# Patient Record
Sex: MALE | Race: WHITE | NOT HISPANIC OR LATINO | Employment: OTHER | URBAN - METROPOLITAN AREA
[De-identification: names, ages, dates, MRNs, and addresses within clinical notes are randomized per-mention and may not be internally consistent; named-entity substitution may affect disease eponyms.]

---

## 2023-04-07 DIAGNOSIS — K21.00 GASTROESOPHAGEAL REFLUX DISEASE WITH ESOPHAGITIS WITHOUT HEMORRHAGE: Primary | ICD-10-CM

## 2023-04-07 RX ORDER — OMEPRAZOLE 40 MG/1
1 CAPSULE, DELAYED RELEASE ORAL DAILY
COMMUNITY
Start: 2013-05-02 | End: 2023-04-07 | Stop reason: SDUPTHER

## 2023-04-07 RX ORDER — OMEPRAZOLE 40 MG/1
40 CAPSULE, DELAYED RELEASE ORAL DAILY
Qty: 90 CAPSULE | Refills: 0 | Status: SHIPPED | OUTPATIENT
Start: 2023-04-07 | End: 2023-05-08 | Stop reason: SDUPTHER

## 2023-04-11 LAB
NATRIURETIC PEPTIDE B (PG/ML) IN SER/PLAS: 22 PG/ML (ref 0–99)
TROPONIN I, HIGH SENSITIVITY: 3 NG/L (ref 0–53)

## 2023-05-01 ENCOUNTER — HOSPITAL ENCOUNTER (OUTPATIENT)
Dept: DATA CONVERSION | Facility: HOSPITAL | Age: 73
End: 2023-05-01
Attending: INTERNAL MEDICINE | Admitting: INTERNAL MEDICINE
Payer: MEDICARE

## 2023-05-01 DIAGNOSIS — Z76.82 AWAITING ORGAN TRANSPLANT STATUS: ICD-10-CM

## 2023-05-01 DIAGNOSIS — C83.18 MANTLE CELL LYMPHOMA, LYMPH NODES OF MULTIPLE SITES (MULTI): ICD-10-CM

## 2023-05-08 DIAGNOSIS — K21.00 GASTROESOPHAGEAL REFLUX DISEASE WITH ESOPHAGITIS WITHOUT HEMORRHAGE: ICD-10-CM

## 2023-05-08 DIAGNOSIS — E78.00 PURE HYPERCHOLESTEROLEMIA: Primary | ICD-10-CM

## 2023-05-08 RX ORDER — OMEPRAZOLE 40 MG/1
40 CAPSULE, DELAYED RELEASE ORAL DAILY
Qty: 90 CAPSULE | Refills: 1 | Status: SHIPPED | OUTPATIENT
Start: 2023-05-08 | End: 2023-09-25 | Stop reason: SDUPTHER

## 2023-05-08 RX ORDER — ATORVASTATIN CALCIUM 40 MG/1
TABLET, FILM COATED ORAL
Qty: 45 TABLET | Refills: 1 | Status: SHIPPED | OUTPATIENT
Start: 2023-05-08 | End: 2024-01-22 | Stop reason: SDUPTHER

## 2023-05-11 ENCOUNTER — HOSPITAL ENCOUNTER (OUTPATIENT)
Dept: DATA CONVERSION | Facility: HOSPITAL | Age: 73
End: 2023-05-11
Attending: INTERNAL MEDICINE
Payer: MEDICARE

## 2023-05-11 DIAGNOSIS — E78.5 HYPERLIPIDEMIA, UNSPECIFIED: ICD-10-CM

## 2023-05-11 DIAGNOSIS — Z85.46 PERSONAL HISTORY OF MALIGNANT NEOPLASM OF PROSTATE: ICD-10-CM

## 2023-05-11 DIAGNOSIS — R42 DIZZINESS AND GIDDINESS: ICD-10-CM

## 2023-05-11 DIAGNOSIS — K21.9 GASTRO-ESOPHAGEAL REFLUX DISEASE WITHOUT ESOPHAGITIS: ICD-10-CM

## 2023-05-11 DIAGNOSIS — Z87.891 PERSONAL HISTORY OF NICOTINE DEPENDENCE: ICD-10-CM

## 2023-05-11 DIAGNOSIS — C83.18 MANTLE CELL LYMPHOMA, LYMPH NODES OF MULTIPLE SITES (MULTI): ICD-10-CM

## 2023-09-07 VITALS — WEIGHT: 195.33 LBS | BODY MASS INDEX: 27.96 KG/M2 | HEIGHT: 70 IN

## 2023-09-19 PROBLEM — S05.02XA LEFT CORNEAL ABRASION: Status: ACTIVE | Noted: 2023-09-19

## 2023-09-19 PROBLEM — S05.8X9A: Status: ACTIVE | Noted: 2023-09-19

## 2023-09-19 PROBLEM — G43.109 MIGRAINE WITH VISUAL AURA: Status: ACTIVE | Noted: 2023-09-19

## 2023-09-19 PROBLEM — S05.32XA: Status: ACTIVE | Noted: 2023-09-19

## 2023-09-19 PROBLEM — H81.8X2 UNILATERAL VESTIBULAR WEAKNESS, LEFT: Status: ACTIVE | Noted: 2023-09-19

## 2023-09-19 PROBLEM — J02.9 ACUTE PHARYNGITIS: Status: ACTIVE | Noted: 2023-09-19

## 2023-09-19 PROBLEM — H26.9 CATARACT OF RIGHT EYE: Status: ACTIVE | Noted: 2023-09-19

## 2023-09-19 PROBLEM — E86.0 DEHYDRATION: Status: ACTIVE | Noted: 2023-09-19

## 2023-09-19 PROBLEM — U07.1 DISEASE DUE TO SEVERE ACUTE RESPIRATORY SYNDROME CORONAVIRUS 2 (SARS-COV-2): Status: ACTIVE | Noted: 2023-09-19

## 2023-09-19 PROBLEM — E78.00 HYPERCHOLESTEROLEMIA: Status: ACTIVE | Noted: 2023-09-19

## 2023-09-19 PROBLEM — R60.9 EDEMA: Status: ACTIVE | Noted: 2023-09-19

## 2023-09-19 PROBLEM — E56.9 VITAMIN DEFICIENCY: Status: ACTIVE | Noted: 2023-09-19

## 2023-09-19 PROBLEM — E55.9 VITAMIN D DEFICIENCY: Status: ACTIVE | Noted: 2023-09-19

## 2023-09-19 PROBLEM — R05.3 CHRONIC COUGH: Status: ACTIVE | Noted: 2023-09-19

## 2023-09-19 PROBLEM — R59.0 MEDIASTINAL ADENOPATHY: Status: ACTIVE | Noted: 2023-09-19

## 2023-09-19 PROBLEM — M19.90 OSTEOARTHRITIS: Status: ACTIVE | Noted: 2023-09-19

## 2023-09-19 PROBLEM — H33.22 LEFT RETINAL DETACHMENT: Status: ACTIVE | Noted: 2023-09-19

## 2023-09-19 PROBLEM — H25.11 CATARACT, NUCLEAR SCLEROTIC, RIGHT EYE: Status: ACTIVE | Noted: 2023-09-19

## 2023-09-19 PROBLEM — Z86.69 H/O RETINAL DETACHMENT: Status: ACTIVE | Noted: 2023-09-19

## 2023-09-19 PROBLEM — M25.50 ARTHRALGIA: Status: ACTIVE | Noted: 2023-09-19

## 2023-09-19 PROBLEM — G92.02: Status: ACTIVE | Noted: 2023-09-19

## 2023-09-19 PROBLEM — G89.3 NEOPLASM RELATED PAIN: Status: ACTIVE | Noted: 2023-09-19

## 2023-09-19 PROBLEM — H90.72 MIXED CONDUCTIVE AND SENSORINEURAL HEARING LOSS OF LEFT EAR WITH UNRESTRICTED HEARING OF RIGHT EAR: Status: ACTIVE | Noted: 2023-09-19

## 2023-09-19 PROBLEM — D84.9 IMMUNOCOMPROMISED STATE (MULTI): Status: ACTIVE | Noted: 2023-09-19

## 2023-09-19 PROBLEM — H50.112 SENSORY DEPRIVATION EXOTROPIA OF LEFT EYE: Status: ACTIVE | Noted: 2023-09-19

## 2023-09-19 PROBLEM — R42 DIZZINESS: Status: ACTIVE | Noted: 2023-09-19

## 2023-09-19 PROBLEM — H54.7 SENSORY DEPRIVATION EXOTROPIA OF LEFT EYE: Status: ACTIVE | Noted: 2023-09-19

## 2023-09-19 PROBLEM — H35.351 CYSTOID MACULAR EDEMA OF RIGHT EYE: Status: ACTIVE | Noted: 2023-09-19

## 2023-09-19 PROBLEM — D61.810 PANCYTOPENIA DUE TO CHEMOTHERAPY (CMS-HCC): Status: ACTIVE | Noted: 2023-09-19

## 2023-09-19 PROBLEM — H40.001 GLAUCOMA SUSPECT, RIGHT EYE: Status: ACTIVE | Noted: 2023-09-19

## 2023-09-19 PROBLEM — J01.90 ACUTE SINUSITIS: Status: ACTIVE | Noted: 2023-09-19

## 2023-09-19 PROBLEM — Z85.72 PERSONAL HISTORY OF LYMPHOMA: Status: ACTIVE | Noted: 2023-09-19

## 2023-09-19 PROBLEM — I26.99 BILATERAL PULMONARY EMBOLISM (MULTI): Status: ACTIVE | Noted: 2023-09-19

## 2023-09-19 PROBLEM — R07.9 CHEST PAIN: Status: ACTIVE | Noted: 2023-09-19

## 2023-09-19 PROBLEM — R06.09 DYSPNEA ON EXERTION: Status: ACTIVE | Noted: 2023-09-19

## 2023-09-19 PROBLEM — C83.10 MANTLE CELL LYMPHOMA (MULTI): Status: ACTIVE | Noted: 2023-09-19

## 2023-09-19 PROBLEM — R42 SUBJECTIVE VERTIGO: Status: ACTIVE | Noted: 2023-09-19

## 2023-09-19 PROBLEM — K21.9 ESOPHAGEAL REFLUX: Status: ACTIVE | Noted: 2023-09-19

## 2023-09-19 PROBLEM — M06.9 RHEUMATOID ARTHRITIS (MULTI): Status: ACTIVE | Noted: 2023-09-19

## 2023-09-19 PROBLEM — H61.23 BILATERAL IMPACTED CERUMEN: Status: ACTIVE | Noted: 2023-09-19

## 2023-09-19 PROBLEM — N20.0 KIDNEY STONE ON RIGHT SIDE: Status: ACTIVE | Noted: 2023-09-19

## 2023-09-19 PROBLEM — C34.90 LUNG CANCER (MULTI): Status: ACTIVE | Noted: 2023-09-19

## 2023-09-19 PROBLEM — D69.6 THROMBOCYTOPENIA (CMS-HCC): Status: ACTIVE | Noted: 2023-09-19

## 2023-09-19 PROBLEM — H81.319 VERTIGO, AURAL: Status: ACTIVE | Noted: 2023-09-19

## 2023-09-19 PROBLEM — H40.059 OCULAR HYPERTENSION: Status: ACTIVE | Noted: 2023-09-19

## 2023-09-19 PROBLEM — H35.371 EPIRETINAL MEMBRANE (ERM) OF RIGHT EYE: Status: ACTIVE | Noted: 2023-09-19

## 2023-09-19 PROBLEM — H52.7 REFRACTIVE ERROR: Status: ACTIVE | Noted: 2023-09-19

## 2023-09-19 PROBLEM — K22.89 ESOPHAGEAL MASS: Status: ACTIVE | Noted: 2023-09-19

## 2023-09-19 PROBLEM — R53.83 FATIGUE: Status: ACTIVE | Noted: 2023-09-19

## 2023-09-19 PROBLEM — H53.8 BLURRED VISION, BILATERAL: Status: ACTIVE | Noted: 2023-09-19

## 2023-09-19 PROBLEM — H40.32X0: Status: ACTIVE | Noted: 2023-09-19

## 2023-09-19 RX ORDER — CETIRIZINE HYDROCHLORIDE 10 MG/1
10 TABLET ORAL NIGHTLY
COMMUNITY
Start: 2022-10-05 | End: 2023-10-24 | Stop reason: WASHOUT

## 2023-09-19 RX ORDER — MULTIVITAMIN
1 TABLET ORAL DAILY
COMMUNITY

## 2023-09-19 RX ORDER — URSODIOL 300 MG/1
CAPSULE ORAL
COMMUNITY
Start: 2023-05-29 | End: 2023-10-24 | Stop reason: WASHOUT

## 2023-09-19 RX ORDER — ACYCLOVIR 400 MG/1
1 TABLET ORAL EVERY 12 HOURS
COMMUNITY
Start: 2023-05-29 | End: 2023-10-24 | Stop reason: SDUPTHER

## 2023-09-19 RX ORDER — BENZONATATE 200 MG/1
1 CAPSULE ORAL
COMMUNITY
Start: 2022-10-12 | End: 2023-10-24 | Stop reason: WASHOUT

## 2023-09-19 RX ORDER — TRAMADOL HYDROCHLORIDE 50 MG/1
1 TABLET ORAL 2 TIMES DAILY PRN
COMMUNITY
Start: 2022-12-07 | End: 2023-10-24 | Stop reason: WASHOUT

## 2023-09-19 RX ORDER — SULFAMETHOXAZOLE AND TRIMETHOPRIM 800; 160 MG/1; MG/1
TABLET ORAL
COMMUNITY
Start: 2023-05-30 | End: 2023-09-26

## 2023-09-19 RX ORDER — MUPIROCIN 20 MG/G
OINTMENT TOPICAL
COMMUNITY
Start: 2023-02-15 | End: 2023-10-24 | Stop reason: WASHOUT

## 2023-09-19 RX ORDER — PANTOPRAZOLE SODIUM 40 MG/1
1 TABLET, DELAYED RELEASE ORAL DAILY
COMMUNITY
Start: 2023-06-12 | End: 2023-10-24 | Stop reason: WASHOUT

## 2023-09-19 RX ORDER — FLUCONAZOLE 200 MG/1
2 TABLET ORAL DAILY
COMMUNITY
Start: 2023-05-30 | End: 2023-10-24 | Stop reason: WASHOUT

## 2023-09-19 RX ORDER — ASPIRIN 81 MG/1
1 TABLET ORAL DAILY
COMMUNITY
End: 2023-10-24 | Stop reason: WASHOUT

## 2023-09-19 RX ORDER — COVID-19 MOLECULAR TEST ASSAY
KIT MISCELLANEOUS
COMMUNITY
Start: 2023-03-10 | End: 2023-09-25

## 2023-09-19 RX ORDER — GUAIFENESIN 100 MG/5ML
20 SOLUTION ORAL EVERY 4 HOURS PRN
COMMUNITY
Start: 2023-05-29 | End: 2023-10-24 | Stop reason: WASHOUT

## 2023-09-19 RX ORDER — ALBUTEROL SULFATE 90 UG/1
AEROSOL, METERED RESPIRATORY (INHALATION)
COMMUNITY
Start: 2022-07-27 | End: 2024-01-22 | Stop reason: ALTCHOICE

## 2023-09-25 ENCOUNTER — OFFICE VISIT (OUTPATIENT)
Dept: PRIMARY CARE | Facility: CLINIC | Age: 73
End: 2023-09-25
Payer: MEDICARE

## 2023-09-25 VITALS
HEIGHT: 70 IN | BODY MASS INDEX: 28.49 KG/M2 | SYSTOLIC BLOOD PRESSURE: 102 MMHG | WEIGHT: 199 LBS | DIASTOLIC BLOOD PRESSURE: 60 MMHG

## 2023-09-25 DIAGNOSIS — K21.00 GASTROESOPHAGEAL REFLUX DISEASE WITH ESOPHAGITIS WITHOUT HEMORRHAGE: ICD-10-CM

## 2023-09-25 DIAGNOSIS — I10 HYPERTENSION, UNSPECIFIED TYPE: ICD-10-CM

## 2023-09-25 DIAGNOSIS — Z79.2 LONG-TERM CURRENT USE OF ANTIBIOTICS FOR PREVENTION OF RECURRENT INFECTION: ICD-10-CM

## 2023-09-25 DIAGNOSIS — N40.0 BENIGN PROSTATIC HYPERPLASIA, UNSPECIFIED WHETHER LOWER URINARY TRACT SYMPTOMS PRESENT: ICD-10-CM

## 2023-09-25 DIAGNOSIS — C83.10 MANTLE CELL LYMPHOMA, UNSPECIFIED BODY REGION (MULTI): Primary | ICD-10-CM

## 2023-09-25 DIAGNOSIS — E78.00 HYPERCHOLESTEROLEMIA: ICD-10-CM

## 2023-09-25 PROCEDURE — 1126F AMNT PAIN NOTED NONE PRSNT: CPT | Performed by: INTERNAL MEDICINE

## 2023-09-25 PROCEDURE — 99213 OFFICE O/P EST LOW 20 MIN: CPT | Performed by: INTERNAL MEDICINE

## 2023-09-25 PROCEDURE — 1159F MED LIST DOCD IN RCRD: CPT | Performed by: INTERNAL MEDICINE

## 2023-09-25 PROCEDURE — 3074F SYST BP LT 130 MM HG: CPT | Performed by: INTERNAL MEDICINE

## 2023-09-25 PROCEDURE — 3078F DIAST BP <80 MM HG: CPT | Performed by: INTERNAL MEDICINE

## 2023-09-25 RX ORDER — OMEPRAZOLE 40 MG/1
40 CAPSULE, DELAYED RELEASE ORAL DAILY
Qty: 90 CAPSULE | Refills: 1 | Status: SHIPPED | OUTPATIENT
Start: 2023-09-25 | End: 2023-10-12 | Stop reason: SDUPTHER

## 2023-09-25 ASSESSMENT — ENCOUNTER SYMPTOMS
OCCASIONAL FEELINGS OF UNSTEADINESS: 0
LOSS OF SENSATION IN FEET: 0
DEPRESSION: 0

## 2023-09-25 NOTE — PROGRESS NOTES
"Subjective   Patient ID: Rip Avalos is a 72 y.o. male who presents for Follow-up (Multiple medical conditions).    1. This gentleman today came here for follow-up on various conditions.  He just lost his grandson.  He is stressed out because of that.  2. He himself got recurrence of Mantle cell lymphoma, for which he is under treatment under [facility].  Prostate, he is going to Dr. Campos at [facility].  Otherwise, appetite and weight are okay.  Sleep okay.    I have personally reviewed the patient's Past Medical History, Medications, Allergies, Social History, and Family History in the EMR.    Review of Systems   All other systems reviewed and are negative.  The patient never had a stroke.  No heart attack.  No diabetes.    Objective   /60   Ht 1.778 m (5' 10\")   Wt 90.3 kg (199 lb)   BMI 28.55 kg/m²     Physical Exam  Vitals reviewed.   Cardiovascular:      Heart sounds: Normal heart sounds, S1 normal and S2 normal. No murmur heard.     No friction rub.   Pulmonary:      Effort: Pulmonary effort is normal.      Breath sounds: Normal breath sounds and air entry.   Abdominal:      Palpations: There is no hepatomegaly, splenomegaly or mass.   Musculoskeletal:      Right lower leg: No edema.      Left lower leg: No edema.   Lymphadenopathy:      Lower Body: No right inguinal adenopathy. No left inguinal adenopathy.   Neurological:      Cranial Nerves: Cranial nerves 2-12 are intact.      Sensory: No sensory deficit.      Motor: Motor function is intact.      Deep Tendon Reflexes: Reflexes are normal and symmetric.     LAB WORK: Laboratory testing discussed.    Assessment/Plan   Problem List Items Addressed This Visit             ICD-10-CM       Cardiac and Vasculature    Hypercholesterolemia - Primary E78.00    Relevant Orders    CBC and Auto Differential    Comprehensive Metabolic Panel    Urinalysis with Reflex Microscopic    Thyroid Stimulating Hormone    Lipid Panel       " Gastrointestinal and Abdominal    Esophageal reflux K21.9    Relevant Medications    omeprazole (PriLOSEC) 40 mg DR capsule       Hematology and Neoplasia    Mantle cell lymphoma (CMS/HCC) C83.10       Symptoms and Signs    Fatigue R53.83     Other Visit Diagnoses         Codes    Hypertension, unspecified type     I10    Benign prostatic hyperplasia, unspecified whether lower urinary tract symptoms present     N40.0    Long-term current use of antibiotics for prevention of recurrent infection     Z79.2        1. Mantle cell lymphoma, under specialist care.  2. Hypertension, okay.  3. High cholesterol, stable.  4. BPH, okay.  5. Prevention of infection.  He is on Bactrim, acyclovir.  I will monitor his kidneys.  6. Blood work ordered.  7. Cholesterol ordered.  8. I shall see him in about two months with repeat tests.  9. Continue to follow.  10. He already got his flu shot.    Scribe Attestation  By signing my name below, IDavina Scribe   attest that this documentation has been prepared under the direction and in the presence of Sun Yuen MD.

## 2023-09-26 PROBLEM — Z79.2 LONG-TERM CURRENT USE OF ANTIBIOTICS FOR PREVENTION OF RECURRENT INFECTION: Status: ACTIVE | Noted: 2023-09-26

## 2023-09-26 PROBLEM — I10 HYPERTENSION: Status: ACTIVE | Noted: 2023-09-26

## 2023-09-26 PROBLEM — N40.0 BENIGN PROSTATIC HYPERPLASIA: Status: ACTIVE | Noted: 2023-09-26

## 2023-10-06 ENCOUNTER — TELEPHONE (OUTPATIENT)
Dept: ADMISSION | Facility: HOSPITAL | Age: 73
End: 2023-10-06
Payer: MEDICARE

## 2023-10-06 DIAGNOSIS — C83.10 MANTLE CELL LYMPHOMA, UNSPECIFIED BODY REGION (MULTI): ICD-10-CM

## 2023-10-06 DIAGNOSIS — C83.10 MANTLE CELL LYMPHOMA, UNSPECIFIED BODY REGION (MULTI): Primary | ICD-10-CM

## 2023-10-06 DIAGNOSIS — Z92.850 HISTORY OF CHIMERIC ANTIGEN RECEPTOR T-CELL THERAPY: Primary | ICD-10-CM

## 2023-10-06 RX ORDER — DIPHENHYDRAMINE HYDROCHLORIDE 50 MG/ML
50 INJECTION INTRAMUSCULAR; INTRAVENOUS AS NEEDED
Status: CANCELLED | OUTPATIENT
Start: 2023-11-21

## 2023-10-06 RX ORDER — ALBUTEROL SULFATE 0.83 MG/ML
3 SOLUTION RESPIRATORY (INHALATION) AS NEEDED
Status: CANCELLED | OUTPATIENT
Start: 2023-11-21

## 2023-10-06 RX ORDER — HEPARIN 100 UNIT/ML
500 SYRINGE INTRAVENOUS AS NEEDED
Status: CANCELLED | OUTPATIENT
Start: 2023-10-16

## 2023-10-06 RX ORDER — HEPARIN SODIUM,PORCINE/PF 10 UNIT/ML
50 SYRINGE (ML) INTRAVENOUS AS NEEDED
Status: CANCELLED | OUTPATIENT
Start: 2023-10-16

## 2023-10-06 RX ORDER — EPINEPHRINE 0.3 MG/.3ML
0.3 INJECTION SUBCUTANEOUS EVERY 5 MIN PRN
Status: CANCELLED | OUTPATIENT
Start: 2023-11-21

## 2023-10-06 RX ORDER — FAMOTIDINE 10 MG/ML
20 INJECTION INTRAVENOUS ONCE AS NEEDED
Status: CANCELLED | OUTPATIENT
Start: 2023-11-21

## 2023-10-06 NOTE — TELEPHONE ENCOUNTER
Erwin is asking if he is able to receive the RSV vaccine.  Also, he is leaving 10/28 for three weeks so he is asking if the plan is to continue taking the Bactrim, he will need a 90-day supply sent in to the [facility] pharmacy on file.

## 2023-10-06 NOTE — TELEPHONE ENCOUNTER
Per Dr. Daley: He can take RSV vacccine  He should continue bactrim.    I called patient and left him a VM letting him know. Bactrim 90-day supply pended to Dr. Daley to submit.

## 2023-10-07 RX ORDER — SULFAMETHOXAZOLE AND TRIMETHOPRIM 800; 160 MG/1; MG/1
TABLET ORAL
Qty: 39 TABLET | Refills: 0 | Status: SHIPPED | OUTPATIENT
Start: 2023-10-07 | End: 2024-01-05

## 2023-10-12 DIAGNOSIS — K21.00 GASTROESOPHAGEAL REFLUX DISEASE WITH ESOPHAGITIS WITHOUT HEMORRHAGE: ICD-10-CM

## 2023-10-12 RX ORDER — OMEPRAZOLE 40 MG/1
40 CAPSULE, DELAYED RELEASE ORAL DAILY
Qty: 90 CAPSULE | Refills: 1 | Status: SHIPPED | OUTPATIENT
Start: 2023-10-12 | End: 2024-04-04

## 2023-10-17 ENCOUNTER — TELEPHONE (OUTPATIENT)
Dept: OTHER | Facility: HOSPITAL | Age: 73
End: 2023-10-17
Payer: MEDICARE

## 2023-10-17 NOTE — RESEARCH NOTES
Research Note Unscheduled Visit     Rip Avalos is enrolled on [id number]. I called regarding his infusion appointment on 10/24. I told them that they were supposed to be scheduled for 1pm on [facility]2 in stead of 11am and I asked if they would be willing to change their visit to 1pm. He agreed. He also mentioned his Bactrim prescription was never sent to the [facility] pharmacy and he needs this before he leaves for vacation on 10/28. He also mentioned he is having issues getting his apixaban refilled a week early for this vacation as well. I told him I would notify the team.

## 2023-10-18 ENCOUNTER — TELEPHONE (OUTPATIENT)
Dept: HEMATOLOGY/ONCOLOGY | Facility: HOSPITAL | Age: 73
End: 2023-10-18
Payer: MEDICARE

## 2023-10-18 PROBLEM — Z92.21 PERSONAL HISTORY OF ANTINEOPLASTIC CHEMOTHERAPY: Status: ACTIVE | Noted: 2023-10-18

## 2023-10-18 PROBLEM — D80.1 HYPOGAMMAGLOBULINEMIA (MULTI): Status: ACTIVE | Noted: 2023-10-18

## 2023-10-18 RX ORDER — FAMOTIDINE 10 MG/ML
20 INJECTION INTRAVENOUS ONCE AS NEEDED
Status: CANCELLED | OUTPATIENT
Start: 2023-10-24

## 2023-10-18 RX ORDER — EPINEPHRINE 0.3 MG/.3ML
0.3 INJECTION SUBCUTANEOUS EVERY 5 MIN PRN
Status: CANCELLED | OUTPATIENT
Start: 2023-10-24

## 2023-10-18 RX ORDER — ALBUTEROL SULFATE 0.83 MG/ML
3 SOLUTION RESPIRATORY (INHALATION) AS NEEDED
Status: CANCELLED | OUTPATIENT
Start: 2023-10-24

## 2023-10-18 RX ORDER — ACETAMINOPHEN 325 MG/1
650 TABLET ORAL ONCE
Status: CANCELLED | OUTPATIENT
Start: 2023-10-24

## 2023-10-18 RX ORDER — DIPHENHYDRAMINE HCL 25 MG
25 CAPSULE ORAL ONCE
Status: CANCELLED | OUTPATIENT
Start: 2023-10-24

## 2023-10-18 RX ORDER — DIPHENHYDRAMINE HYDROCHLORIDE 50 MG/ML
50 INJECTION INTRAMUSCULAR; INTRAVENOUS AS NEEDED
Status: CANCELLED | OUTPATIENT
Start: 2023-10-24

## 2023-10-18 NOTE — TELEPHONE ENCOUNTER
Patient is in need of Bactrim DS and apixaban. Bactrim DS was sent to [facility] pharmacy on 10/7/2023. I called the pharmacy and they will fill the Bactrim DS prescription today. Apixaban was not due for a refill until 11/7, but he is leaving for a 3 week vacation 10/28. [facility] pharmacy requested a vacation override which was approved. Both the Bactrim DS and apixaban medications will be ready this evening for the patient.     I called the patient to inform them that their medications will be ready at [facility] this evening.     I also recommended that he receive 3 doses of [id number] Formula COVID-19 (mRNA) vaccines at an outside facility. The patient received his first dose on 8/18/2023, so he requires two additional vaccines. The patient was not aware of this recommendation.    The patient is coming on 10/24 for IVIG therapy, but no orders were placed. I placed these orders.     Karley Cote, PharmD  Ambulatory stem cell transplant pharmacist

## 2023-10-19 ENCOUNTER — OFFICE VISIT (OUTPATIENT)
Dept: OPHTHALMOLOGY | Facility: CLINIC | Age: 73
End: 2023-10-19
Payer: MEDICARE

## 2023-10-19 DIAGNOSIS — H52.221 REGULAR ASTIGMATISM OF RIGHT EYE: ICD-10-CM

## 2023-10-19 DIAGNOSIS — H33.22 LEFT RETINAL DETACHMENT: ICD-10-CM

## 2023-10-19 DIAGNOSIS — Z86.69 H/O RETINAL DETACHMENT: ICD-10-CM

## 2023-10-19 DIAGNOSIS — H40.32X3 GLAUCOMA OF LEFT EYE ASSOCIATED WITH OCULAR TRAUMA, SEVERE STAGE: ICD-10-CM

## 2023-10-19 DIAGNOSIS — H40.001 GLAUCOMA SUSPECT OF RIGHT EYE: Primary | ICD-10-CM

## 2023-10-19 DIAGNOSIS — H40.001 GLAUCOMA SUSPECT, RIGHT EYE: ICD-10-CM

## 2023-10-19 DIAGNOSIS — S05.32XS: ICD-10-CM

## 2023-10-19 DIAGNOSIS — Q13.1 ANIRIDIA: ICD-10-CM

## 2023-10-19 DIAGNOSIS — H27.02 APHAKIA OF LEFT EYE: ICD-10-CM

## 2023-10-19 PROBLEM — H52.223 REGULAR ASTIGMATISM OF BOTH EYES: Status: ACTIVE | Noted: 2023-10-19

## 2023-10-19 PROCEDURE — 92133 CPTRZD OPH DX IMG PST SGM ON: CPT | Mod: RIGHT SIDE | Performed by: STUDENT IN AN ORGANIZED HEALTH CARE EDUCATION/TRAINING PROGRAM

## 2023-10-19 PROCEDURE — 92014 COMPRE OPH EXAM EST PT 1/>: CPT | Performed by: STUDENT IN AN ORGANIZED HEALTH CARE EDUCATION/TRAINING PROGRAM

## 2023-10-19 PROCEDURE — 92015 DETERMINE REFRACTIVE STATE: CPT | Performed by: STUDENT IN AN ORGANIZED HEALTH CARE EDUCATION/TRAINING PROGRAM

## 2023-10-19 ASSESSMENT — TONOMETRY
OD_IOP_MMHG: 19
OS_IOP_MMHG: 33
IOP_METHOD: GOLDMANN APPLANATION

## 2023-10-19 ASSESSMENT — SLIT LAMP EXAM - LIDS
COMMENTS: MGD UL/LL
COMMENTS: MGD UL/LL

## 2023-10-19 ASSESSMENT — ENCOUNTER SYMPTOMS
ALLERGIC/IMMUNOLOGIC NEGATIVE: 0
ENDOCRINE NEGATIVE: 0
PSYCHIATRIC NEGATIVE: 0
MUSCULOSKELETAL NEGATIVE: 0
CARDIOVASCULAR NEGATIVE: 0
NEUROLOGICAL NEGATIVE: 0
CONSTITUTIONAL NEGATIVE: 0
RESPIRATORY NEGATIVE: 0
EYES NEGATIVE: 0
GASTROINTESTINAL NEGATIVE: 0
HEMATOLOGIC/LYMPHATIC NEGATIVE: 0

## 2023-10-19 ASSESSMENT — REFRACTION_MANIFEST
OD_ADD: +3.00
OD_CYLINDER: +1.50
OD_SPHERE: -0.75
OS_SPHERE: BALANCE
OD_AXIS: 178

## 2023-10-19 ASSESSMENT — CONF VISUAL FIELD
OD_INFERIOR_TEMPORAL_RESTRICTION: 0
OD_SUPERIOR_NASAL_RESTRICTION: 0
OS_SUPERIOR_TEMPORAL_RESTRICTION: 1
OD_NORMAL: 1
OS_INFERIOR_TEMPORAL_RESTRICTION: 1
OS_SUPERIOR_NASAL_RESTRICTION: 1
OD_INFERIOR_NASAL_RESTRICTION: 0
OD_SUPERIOR_TEMPORAL_RESTRICTION: 0
OS_INFERIOR_NASAL_RESTRICTION: 1

## 2023-10-19 ASSESSMENT — REFRACTION_WEARINGRX
OS_SPHERE: BALANCED
OD_AXIS: 025
OD_CYLINDER: +1.25
OD_SPHERE: -0.50

## 2023-10-19 ASSESSMENT — CUP TO DISC RATIO: OD_RATIO: .40

## 2023-10-19 ASSESSMENT — EXTERNAL EXAM - LEFT EYE: OS_EXAM: DERMATOCHALASIS UL

## 2023-10-19 ASSESSMENT — VISUAL ACUITY
CORRECTION_TYPE: GLASSES
METHOD: SNELLEN - LINEAR
OS_CC: NLP
OD_CC: 20/25-1

## 2023-10-19 ASSESSMENT — EXTERNAL EXAM - RIGHT EYE: OD_EXAM: DERMATOCHALASIS UL

## 2023-10-19 NOTE — ASSESSMENT & PLAN NOTE
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  -intraocular pressure (IOP) today stable 19/33  -no pain in the left eye  -not currently on drops   -ONH appearance stable right eye (OD)  -OCT RNFL today stable right eye (OD) to previous without progression  -Ed on importance of monitoring   -Okay to continue off drops; especially left eye (OS) since eye is no light perception (NLP) and without pain

## 2023-10-19 NOTE — ASSESSMENT & PLAN NOTE
Updated spec RX at today's exam. BCVA stable right eye (OD) 20/25 left eye (OS) no light perception (NLP). Monocular precautions discussed with FTW and darya.

## 2023-10-19 NOTE — PROGRESS NOTES
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 BCVA stable right eye (OD) 20/25 left eye (OS) no light perception (NLP). Monocular precautions discussed with FTW and darya.           Other Visit Diagnoses       Glaucoma suspect of right eye    -  Primary    Relevant Orders    OCT, Optic Nerve - OD - Right Eye (Completed)          RTC 6 months for intraocular pressure (IOP) check and [place] visual field (HVF) 24-2

## 2023-10-23 NOTE — ASSESSMENT & PLAN NOTE
MCL  Diagnosed ; MIPI: N/A; ECO; KPS of 70  TP53: negative, ki-67: 10%    Treatment  BR x 4 cycles, followed by maintenance Ritux x 4 ( last dose )    Restaging  PET scan (23): increased uptake in hilar/ mediastinal LN concerning for relapsed disease  Bronchoscopy (23): biopsy consistent with MCL ( no Ki-67 or TP53 reported)  BM bx (2023) STEFFI    CART Cell therapy: (Day 0: 23) on clinical trial CASE 2422. Preparative regimen included fludarabine 30mg/m2 & cyclophosphamide 500mg/m2 on days T-4, T-3, & T-2.     Hospital course complicated by grade 3 neurotox and grade 2 CRS with fever and confusion; infectious workup negative, CTH negative for acute process, managed w/ toci x2 doses and dexamethasone taper that was completed on . Steroid induced hallucinations resolved at discharge.     Response Monitoring:  Day 30: PET scan (23) CR (De[place]ville 1)  Day 60: PET scan (23) CR (Deauville 1)  Day 90: PET scan (23) CR (Deauville 1)  6m PET scan scheduled for 23.    Infectious prophylaxis:   Antiviral prophylaxis: acyclovir, continue at least 6 months.   PCP prophylaxis: TMP-SMX DS, M/W/F; continue PJP prophylaxis at least 6 months    CMV monitoring  CMV DNA PCR (with each visit): ND 10/25/23    IgG 695 10/25/23: IVIG 23 and 10/25/23, precert not required; auth ends 23.     Anti-infective Vaccines: Initiate vaccine protocol 6 month post CART visit (November).

## 2023-10-23 NOTE — ASSESSMENT & PLAN NOTE
Patient presented to the ED on 6/8/23 with R sided low back pain that started acutely that evening; pain worse with inspiration. CT revealed bilateral PEs. US BLE negative for DVT.    Started on heparin drip in the ED and patient was admitted for further workup and management of PE.   Initially patient was transitioned to LMWH, however, because of prescription cost he was discharged on Eliquis and will continue for 6mths.

## 2023-10-23 NOTE — PROGRESS NOTES
Patient ID:  Rip Avalos is a 72 y.o. male.  Oncologist Dr Sue  Primary Care Provider:  Sun Yuen MD      Problem List Items Addressed This Visit             ICD-10-CM    Immunocompromised state (CMS/HCC) D84.9    Relevant Medications    azithromycin (Zithromax) 250 mg tablet    Mantle cell lymphoma (CMS/HCC) C83.10     MCL  Diagnosed ; MIPI: N/A; ECO; KPS of 70  TP53: negative, ki-67: 10%    Treatment  BR x 4 cycles, followed by maintenance Ritux x 4 ( last dose )    Restaging  PET scan (23): increased uptake in hilar/ mediastinal LN concerning for relapsed disease  Bronchoscopy (23): biopsy consistent with MCL ( no Ki-67 or TP53 reported)  BM bx (2023) STEFFI    CART Cell therapy: (Day 0: 23) on clinical trial CASE 2422. Preparative regimen included fludarabine 30mg/m2 & cyclophosphamide 500mg/m2 on days T-4, T-3, & T-2.     Hospital course complicated by grade 3 neurotox and grade 2 CRS with fever and confusion; infectious workup negative, CTH negative for acute process, managed w/ toci x2 doses and dexamethasone taper that was completed on . Steroid induced hallucinations resolved at discharge.     Response Monitoring:  Day 30: PET scan (23) CR ([place]ville 1)  Day 60: PET scan (23) CR (De[place]ville 1)  Day 90: PET scan (23) CR (Deauville 1)  6m PET scan scheduled for 23.    Infectious prophylaxis:   Antiviral prophylaxis: acyclovir, continue at least 6 months.   PCP prophylaxis: TMP-SMX DS, M/W/F; continue PJP prophylaxis at least 6 months    CMV monitoring  CMV DNA PCR (with each visit): ND 10/25/23    IgG 695 10/25/23: IVIG 23 and 10/25/23, precert not required; auth ends 23.     Anti-infective Vaccines: Initiate vaccine protocol 6 month post CART visit (November).          Relevant Medications    azithromycin (Zithromax) 250 mg tablet    Other Relevant Orders    CBC and Auto Differential    Comprehensive Metabolic Panel    Lactate  "dehydrogenase    Uric acid    IgG    CMV DNA, Quantitative, PCR, Plasma    Bilateral pulmonary embolism (CMS/[facility]) I26.99     Patient presented to the ED on 6/8/23 with R sided low back pain that started acutely that evening; pain worse with inspiration. CT revealed bilateral PEs. US BLE negative for DVT.    Started on heparin drip in the ED and patient was admitted for further workup and management of PE.   Initially patient was transitioned to LMWH, however, because of prescription cost he was discharged on Eliquis and will continue for 6mths.          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 Nausea. \"Felt terrible.\" Sleeping a lot. Dizziness worse. Drinking ok. Has had both flu and RSV vaccines.    Today, he states that he feels back to baseline. Appetite fine. Endurance down. Pretty active.    Cough comes and goes. Bringing up phlegm now.     Leaving for cruise on Sat x3 weeks.             Review of Systems   Constitutional: Negative.    HENT:  Negative.     Eyes: Negative.    Respiratory: Negative.     Cardiovascular: Negative.    Gastrointestinal: Negative.    Endocrine: Negative.    Genitourinary: Negative.     Musculoskeletal: Negative.    Skin: Negative.    Neurological: Negative.    Hematological: Negative.    Psychiatric/Behavioral:  Positive for sleep disturbance.         Sleeps 3-5 hours/night.         Objective      Physical Exam  Vitals reviewed.   Constitutional:       Appearance: Normal appearance.   HENT:      Head: Normocephalic and atraumatic.      Nose: Nose normal.      Mouth/Throat:      Mouth: Mucous membranes are moist.   Eyes:      Pupils: Pupils are equal, round, and reactive to light.      Comments: L eye patch "   Cardiovascular:      Rate and Rhythm: Normal rate and regular rhythm.      Pulses: Normal pulses.      Heart sounds: Normal heart sounds.   Pulmonary:      Effort: Pulmonary effort is normal.      Breath sounds: Normal breath sounds.   Abdominal:      General: Abdomen is flat. Bowel sounds are normal.      Palpations: Abdomen is soft.   Musculoskeletal:         General: Normal range of motion.      Cervical back: Normal range of motion.   Skin:     General: Skin is warm and dry.   Neurological:      General: No focal deficit present.      Mental Status: He is alert and oriented to person, place, and time.   Psychiatric:         Mood and Affect: Mood normal.

## 2023-10-24 ENCOUNTER — OFFICE VISIT (OUTPATIENT)
Dept: HEMATOLOGY/ONCOLOGY | Facility: HOSPITAL | Age: 73
End: 2023-10-24
Payer: MEDICARE

## 2023-10-24 ENCOUNTER — APPOINTMENT (OUTPATIENT)
Dept: OTHER | Facility: HOSPITAL | Age: 73
End: 2023-10-24
Payer: MEDICARE

## 2023-10-24 ENCOUNTER — TREATMENT (OUTPATIENT)
Dept: OTHER | Facility: HOSPITAL | Age: 73
End: 2023-10-24
Payer: MEDICARE

## 2023-10-24 ENCOUNTER — APPOINTMENT (OUTPATIENT)
Dept: HEMATOLOGY/ONCOLOGY | Facility: HOSPITAL | Age: 73
End: 2023-10-24
Payer: MEDICARE

## 2023-10-24 VITALS
WEIGHT: 196.87 LBS | SYSTOLIC BLOOD PRESSURE: 126 MMHG | OXYGEN SATURATION: 98 % | TEMPERATURE: 98.1 F | DIASTOLIC BLOOD PRESSURE: 70 MMHG | HEART RATE: 78 BPM | RESPIRATION RATE: 18 BRPM | BODY MASS INDEX: 28.25 KG/M2

## 2023-10-24 DIAGNOSIS — D84.9 IMMUNOCOMPROMISED STATE (MULTI): ICD-10-CM

## 2023-10-24 DIAGNOSIS — Z92.21 PERSONAL HISTORY OF ANTINEOPLASTIC CHEMOTHERAPY: ICD-10-CM

## 2023-10-24 DIAGNOSIS — D80.1 HYPOGAMMAGLOBULINEMIA (MULTI): ICD-10-CM

## 2023-10-24 DIAGNOSIS — D80.1 HYPOGAMMAGLOBULINEMIA (MULTI): Primary | ICD-10-CM

## 2023-10-24 DIAGNOSIS — C83.10 MANTLE CELL LYMPHOMA, UNSPECIFIED BODY REGION (MULTI): ICD-10-CM

## 2023-10-24 DIAGNOSIS — Z92.850 HISTORY OF CHIMERIC ANTIGEN RECEPTOR T-CELL THERAPY: ICD-10-CM

## 2023-10-24 DIAGNOSIS — I26.99 BILATERAL PULMONARY EMBOLISM (MULTI): ICD-10-CM

## 2023-10-24 LAB
ALBUMIN SERPL BCP-MCNC: 4 G/DL (ref 3.4–5)
ALP SERPL-CCNC: 83 U/L (ref 33–136)
ALT SERPL W P-5'-P-CCNC: 19 U/L (ref 10–52)
ANION GAP SERPL CALC-SCNC: 14 MMOL/L (ref 10–20)
AST SERPL W P-5'-P-CCNC: 20 U/L (ref 9–39)
BASOPHILS # BLD AUTO: 0.03 X10*3/UL (ref 0–0.1)
BASOPHILS NFR BLD AUTO: 0.8 %
BILIRUB SERPL-MCNC: 0.4 MG/DL (ref 0–1.2)
BUN SERPL-MCNC: 19 MG/DL (ref 6–23)
CALCIUM SERPL-MCNC: 9.3 MG/DL (ref 8.6–10.3)
CHLORIDE SERPL-SCNC: 106 MMOL/L (ref 98–107)
CO2 SERPL-SCNC: 25 MMOL/L (ref 21–32)
CREAT SERPL-MCNC: 0.78 MG/DL (ref 0.5–1.3)
EOSINOPHIL # BLD AUTO: 0.13 X10*3/UL (ref 0–0.4)
EOSINOPHIL NFR BLD AUTO: 3.6 %
ERYTHROCYTE [DISTWIDTH] IN BLOOD BY AUTOMATED COUNT: 13.9 % (ref 11.5–14.5)
GFR SERPL CREATININE-BSD FRML MDRD: >90 ML/MIN/1.73M*2
GLUCOSE SERPL-MCNC: 88 MG/DL (ref 74–99)
HCT VFR BLD AUTO: 43.7 % (ref 41–52)
HGB BLD-MCNC: 14.2 G/DL (ref 13.5–17.5)
HOLD SPECIMEN: NORMAL
IGG SERPL-MCNC: 695 MG/DL (ref 700–1600)
IMM GRANULOCYTES # BLD AUTO: 0.01 X10*3/UL (ref 0–0.5)
IMM GRANULOCYTES NFR BLD AUTO: 0.3 % (ref 0–0.9)
LDH SERPL L TO P-CCNC: 134 U/L (ref 84–246)
LYMPHOCYTES # BLD AUTO: 0.4 X10*3/UL (ref 0.8–3)
LYMPHOCYTES NFR BLD AUTO: 11 %
MCH RBC QN AUTO: 29.8 PG (ref 26–34)
MCHC RBC AUTO-ENTMCNC: 32.5 G/DL (ref 32–36)
MCV RBC AUTO: 92 FL (ref 80–100)
MONOCYTES # BLD AUTO: 0.56 X10*3/UL (ref 0.05–0.8)
MONOCYTES NFR BLD AUTO: 15.5 %
NEUTROPHILS # BLD AUTO: 2.49 X10*3/UL (ref 1.6–5.5)
NEUTROPHILS NFR BLD AUTO: 68.8 %
NRBC BLD-RTO: 0 /100 WBCS (ref 0–0)
PLATELET # BLD AUTO: 179 X10*3/UL (ref 150–450)
PMV BLD AUTO: 10.1 FL (ref 7.5–11.5)
POTASSIUM SERPL-SCNC: 4 MMOL/L (ref 3.5–5.3)
PROT SERPL-MCNC: 6.8 G/DL (ref 6.4–8.2)
RBC # BLD AUTO: 4.77 X10*6/UL (ref 4.5–5.9)
SODIUM SERPL-SCNC: 141 MMOL/L (ref 136–145)
URATE SERPL-MCNC: 5.6 MG/DL (ref 4–7.5)
WBC # BLD AUTO: 3.6 X10*3/UL (ref 4.4–11.3)

## 2023-10-24 PROCEDURE — 83615 LACTATE (LD) (LDH) ENZYME: CPT

## 2023-10-24 PROCEDURE — 1126F AMNT PAIN NOTED NONE PRSNT: CPT | Performed by: NURSE PRACTITIONER

## 2023-10-24 PROCEDURE — 96365 THER/PROPH/DIAG IV INF INIT: CPT

## 2023-10-24 PROCEDURE — 99214 OFFICE O/P EST MOD 30 MIN: CPT | Performed by: NURSE PRACTITIONER

## 2023-10-24 PROCEDURE — 2500000001 HC RX 250 WO HCPCS SELF ADMINISTERED DRUGS (ALT 637 FOR MEDICARE OP): Performed by: NURSE PRACTITIONER

## 2023-10-24 PROCEDURE — 80053 COMPREHEN METABOLIC PANEL: CPT

## 2023-10-24 PROCEDURE — 1160F RVW MEDS BY RX/DR IN RCRD: CPT | Performed by: NURSE PRACTITIONER

## 2023-10-24 PROCEDURE — 36415 COLL VENOUS BLD VENIPUNCTURE: CPT

## 2023-10-24 PROCEDURE — 1159F MED LIST DOCD IN RCRD: CPT | Performed by: NURSE PRACTITIONER

## 2023-10-24 PROCEDURE — 96366 THER/PROPH/DIAG IV INF ADDON: CPT

## 2023-10-24 PROCEDURE — 82784 ASSAY IGA/IGD/IGG/IGM EACH: CPT | Performed by: NURSE PRACTITIONER

## 2023-10-24 PROCEDURE — 99214 OFFICE O/P EST MOD 30 MIN: CPT | Mod: 25 | Performed by: NURSE PRACTITIONER

## 2023-10-24 PROCEDURE — 84550 ASSAY OF BLOOD/URIC ACID: CPT

## 2023-10-24 PROCEDURE — 2500000004 HC RX 250 GENERAL PHARMACY W/ HCPCS (ALT 636 FOR OP/ED): Performed by: NURSE PRACTITIONER

## 2023-10-24 PROCEDURE — 96375 TX/PRO/DX INJ NEW DRUG ADDON: CPT

## 2023-10-24 PROCEDURE — 85025 COMPLETE CBC W/AUTO DIFF WBC: CPT

## 2023-10-24 PROCEDURE — 2500000004 HC RX 250 GENERAL PHARMACY W/ HCPCS (ALT 636 FOR OP/ED): Mod: JZ | Performed by: NURSE PRACTITIONER

## 2023-10-24 RX ORDER — HEPARIN SODIUM,PORCINE/PF 10 UNIT/ML
50 SYRINGE (ML) INTRAVENOUS AS NEEDED
Status: CANCELLED | OUTPATIENT
Start: 2023-10-24

## 2023-10-24 RX ORDER — ACETAMINOPHEN 325 MG/1
650 TABLET ORAL ONCE
Status: CANCELLED | OUTPATIENT
Start: 2023-11-21

## 2023-10-24 RX ORDER — DIPHENHYDRAMINE HCL 25 MG
25 CAPSULE ORAL ONCE
Status: CANCELLED | OUTPATIENT
Start: 2023-11-21

## 2023-10-24 RX ORDER — EPINEPHRINE 0.3 MG/.3ML
0.3 INJECTION SUBCUTANEOUS EVERY 5 MIN PRN
Status: CANCELLED | OUTPATIENT
Start: 2023-11-21

## 2023-10-24 RX ORDER — ALBUTEROL SULFATE 0.83 MG/ML
3 SOLUTION RESPIRATORY (INHALATION) AS NEEDED
Status: CANCELLED | OUTPATIENT
Start: 2023-11-21

## 2023-10-24 RX ORDER — DIPHENHYDRAMINE HYDROCHLORIDE 50 MG/ML
50 INJECTION INTRAMUSCULAR; INTRAVENOUS AS NEEDED
Status: CANCELLED | OUTPATIENT
Start: 2023-11-21

## 2023-10-24 RX ORDER — FAMOTIDINE 10 MG/ML
20 INJECTION INTRAVENOUS ONCE AS NEEDED
Status: CANCELLED | OUTPATIENT
Start: 2023-11-21

## 2023-10-24 RX ORDER — HEPARIN 100 UNIT/ML
500 SYRINGE INTRAVENOUS AS NEEDED
Status: CANCELLED | OUTPATIENT
Start: 2023-10-24

## 2023-10-24 RX ORDER — DIPHENHYDRAMINE HCL 25 MG
25 CAPSULE ORAL ONCE
Status: COMPLETED | OUTPATIENT
Start: 2023-10-24 | End: 2023-10-24

## 2023-10-24 RX ORDER — AZITHROMYCIN 250 MG/1
TABLET, FILM COATED ORAL
Qty: 6 TABLET | Refills: 0 | Status: SHIPPED | OUTPATIENT
Start: 2023-10-24 | End: 2023-12-04 | Stop reason: ALTCHOICE

## 2023-10-24 RX ORDER — ACETAMINOPHEN 325 MG/1
650 TABLET ORAL ONCE
Status: COMPLETED | OUTPATIENT
Start: 2023-10-24 | End: 2023-10-24

## 2023-10-24 RX ADMIN — METHYLPREDNISOLONE SODIUM SUCCINATE 20 MG: 40 INJECTION, POWDER, FOR SOLUTION INTRAMUSCULAR; INTRAVENOUS at 12:52

## 2023-10-24 RX ADMIN — ACETAMINOPHEN 650 MG: 325 TABLET ORAL at 12:52

## 2023-10-24 RX ADMIN — IMMUNE GLOBULIN INFUSION (HUMAN) 35 G: 100 INJECTION, SOLUTION INTRAVENOUS; SUBCUTANEOUS at 13:49

## 2023-10-24 RX ADMIN — DIPHENHYDRAMINE HYDROCHLORIDE 25 MG: 25 CAPSULE ORAL at 12:51

## 2023-10-24 ASSESSMENT — ENCOUNTER SYMPTOMS
SLEEP DISTURBANCE: 1
GASTROINTESTINAL NEGATIVE: 1
HEMATOLOGIC/LYMPHATIC NEGATIVE: 1
MUSCULOSKELETAL NEGATIVE: 1
CONSTITUTIONAL NEGATIVE: 1
ENDOCRINE NEGATIVE: 1
NEUROLOGICAL NEGATIVE: 1
EYES NEGATIVE: 1
CARDIOVASCULAR NEGATIVE: 1
RESPIRATORY NEGATIVE: 1

## 2023-10-24 NOTE — PROGRESS NOTES
Pt ambulated to SCT unit without assistance accompanied by spouse. Pt denies complaints or pain today. Vitals obtained, blood drawn via 25g butterfly in right hand and sent to lab. Ellie Escalante NP came to see patient. Pt received IVIG as ordered, pt tolerated IVIG without incidence or noted reaction, vitals remained stable throughout the duration of the infusion.Pt ambulated off unit without complaints or assistance.

## 2023-10-25 LAB
CMV DNA SERPL NAA+PROBE-LOG IU: NORMAL {LOG_IU}/ML
LABORATORY COMMENT REPORT: NOT DETECTED

## 2023-11-17 DIAGNOSIS — I26.99 BILATERAL PULMONARY EMBOLISM (MULTI): ICD-10-CM

## 2023-11-17 DIAGNOSIS — Z92.850 HISTORY OF CHIMERIC ANTIGEN RECEPTOR T-CELL THERAPY: ICD-10-CM

## 2023-11-17 DIAGNOSIS — C83.10 MANTLE CELL LYMPHOMA, UNSPECIFIED BODY REGION (MULTI): ICD-10-CM

## 2023-11-17 DIAGNOSIS — Z00.6 RESEARCH STUDY PATIENT: ICD-10-CM

## 2023-11-21 ENCOUNTER — HOSPITAL ENCOUNTER (OUTPATIENT)
Dept: RADIOLOGY | Facility: HOSPITAL | Age: 73
Discharge: HOME | End: 2023-11-21
Payer: MEDICARE

## 2023-11-21 ENCOUNTER — LAB (OUTPATIENT)
Dept: OTHER | Facility: HOSPITAL | Age: 73
End: 2023-11-21
Payer: MEDICARE

## 2023-11-21 ENCOUNTER — OFFICE VISIT (OUTPATIENT)
Dept: HEMATOLOGY/ONCOLOGY | Facility: HOSPITAL | Age: 73
End: 2023-11-21
Payer: MEDICARE

## 2023-11-21 ENCOUNTER — TREATMENT (OUTPATIENT)
Dept: OTHER | Facility: HOSPITAL | Age: 73
End: 2023-11-21
Payer: MEDICARE

## 2023-11-21 VITALS
BODY MASS INDEX: 28.44 KG/M2 | DIASTOLIC BLOOD PRESSURE: 75 MMHG | RESPIRATION RATE: 18 BRPM | HEART RATE: 83 BPM | SYSTOLIC BLOOD PRESSURE: 142 MMHG | TEMPERATURE: 98.1 F | WEIGHT: 198.19 LBS | OXYGEN SATURATION: 96 %

## 2023-11-21 DIAGNOSIS — D80.1 HYPOGAMMAGLOBULINEMIA (MULTI): ICD-10-CM

## 2023-11-21 DIAGNOSIS — R05.1 ACUTE COUGH: ICD-10-CM

## 2023-11-21 DIAGNOSIS — C83.10 MANTLE CELL LYMPHOMA, UNSPECIFIED BODY REGION (MULTI): ICD-10-CM

## 2023-11-21 DIAGNOSIS — C83.18 MANTLE CELL LYMPHOMA, LYMPH NODES OF MULTIPLE SITES (MULTI): ICD-10-CM

## 2023-11-21 DIAGNOSIS — Z92.850 HISTORY OF CHIMERIC ANTIGEN RECEPTOR T-CELL THERAPY: ICD-10-CM

## 2023-11-21 DIAGNOSIS — R05.1 ACUTE COUGH: Primary | ICD-10-CM

## 2023-11-21 DIAGNOSIS — I26.99 BILATERAL PULMONARY EMBOLISM (MULTI): ICD-10-CM

## 2023-11-21 DIAGNOSIS — C83.10 MANTLE CELL LYMPHOMA, UNSPECIFIED BODY REGION (MULTI): Primary | ICD-10-CM

## 2023-11-21 DIAGNOSIS — D84.9 IMMUNOCOMPROMISED STATE (MULTI): ICD-10-CM

## 2023-11-21 DIAGNOSIS — Z00.6 RESEARCH STUDY PATIENT: ICD-10-CM

## 2023-11-21 LAB
ALBUMIN SERPL BCP-MCNC: 3.9 G/DL (ref 3.4–5)
ALP SERPL-CCNC: 72 U/L (ref 33–136)
ALT SERPL W P-5'-P-CCNC: 22 U/L (ref 10–52)
ANION GAP SERPL CALC-SCNC: 12 MMOL/L (ref 10–20)
APTT PPP: 36 SECONDS (ref 27–38)
AST SERPL W P-5'-P-CCNC: 18 U/L (ref 9–39)
BASOPHILS # BLD AUTO: 0.04 X10*3/UL (ref 0–0.1)
BASOPHILS NFR BLD AUTO: 1 %
BILIRUB SERPL-MCNC: 0.5 MG/DL (ref 0–1.2)
BUN SERPL-MCNC: 13 MG/DL (ref 6–23)
CALCIUM SERPL-MCNC: 8.9 MG/DL (ref 8.6–10.3)
CHLORIDE SERPL-SCNC: 106 MMOL/L (ref 98–107)
CO2 SERPL-SCNC: 25 MMOL/L (ref 21–32)
CREAT SERPL-MCNC: 0.82 MG/DL (ref 0.5–1.3)
EOSINOPHIL # BLD AUTO: 0.09 X10*3/UL (ref 0–0.4)
EOSINOPHIL NFR BLD AUTO: 2.2 %
ERYTHROCYTE [DISTWIDTH] IN BLOOD BY AUTOMATED COUNT: 14.2 % (ref 11.5–14.5)
FERRITIN SERPL-MCNC: 65 NG/ML (ref 20–300)
FLUAV RNA RESP QL NAA+PROBE: NOT DETECTED
FLUBV RNA RESP QL NAA+PROBE: NOT DETECTED
GFR SERPL CREATININE-BSD FRML MDRD: >90 ML/MIN/1.73M*2
GLUCOSE BLD MANUAL STRIP-MCNC: 101 MG/DL (ref 74–99)
GLUCOSE SERPL-MCNC: 98 MG/DL (ref 74–99)
HADV DNA SPEC QL NAA+PROBE: NOT DETECTED
HCT VFR BLD AUTO: 39.4 % (ref 41–52)
HGB BLD-MCNC: 13 G/DL (ref 13.5–17.5)
HMPV RNA SPEC QL NAA+PROBE: NOT DETECTED
HPIV1 RNA SPEC QL NAA+PROBE: NOT DETECTED
HPIV2 RNA SPEC QL NAA+PROBE: NOT DETECTED
HPIV3 RNA SPEC QL NAA+PROBE: NOT DETECTED
HPIV4 RNA SPEC QL NAA+PROBE: NOT DETECTED
IGG SERPL-MCNC: 825 MG/DL (ref 700–1600)
IMM GRANULOCYTES # BLD AUTO: 0.01 X10*3/UL (ref 0–0.5)
IMM GRANULOCYTES NFR BLD AUTO: 0.2 % (ref 0–0.9)
INR PPP: 1.2 (ref 0.9–1.1)
LDH SERPL L TO P-CCNC: 195 U/L (ref 84–246)
LYMPHOCYTES # BLD AUTO: 0.27 X10*3/UL (ref 0.8–3)
LYMPHOCYTES NFR BLD AUTO: 6.7 %
MAGNESIUM SERPL-MCNC: 2.11 MG/DL (ref 1.6–2.4)
MCH RBC QN AUTO: 29.7 PG (ref 26–34)
MCHC RBC AUTO-ENTMCNC: 33 G/DL (ref 32–36)
MCV RBC AUTO: 90 FL (ref 80–100)
MONOCYTES # BLD AUTO: 0.65 X10*3/UL (ref 0.05–0.8)
MONOCYTES NFR BLD AUTO: 16 %
NEUTROPHILS # BLD AUTO: 3 X10*3/UL (ref 1.6–5.5)
NEUTROPHILS NFR BLD AUTO: 73.9 %
NRBC BLD-RTO: 0 /100 WBCS (ref 0–0)
PLATELET # BLD AUTO: 180 X10*3/UL (ref 150–450)
POTASSIUM SERPL-SCNC: 4 MMOL/L (ref 3.5–5.3)
PROT SERPL-MCNC: 6.1 G/DL (ref 6.4–8.2)
PROTHROMBIN TIME: 14 SECONDS (ref 9.8–12.8)
RBC # BLD AUTO: 4.38 X10*6/UL (ref 4.5–5.9)
RHINOVIRUS RNA UPPER RESP QL NAA+PROBE: NOT DETECTED
RSV RNA RESP QL NAA+PROBE: NOT DETECTED
SODIUM SERPL-SCNC: 139 MMOL/L (ref 136–145)
URATE SERPL-MCNC: 4.2 MG/DL (ref 4–7.5)
WBC # BLD AUTO: 4.1 X10*3/UL (ref 4.4–11.3)

## 2023-11-21 PROCEDURE — 85025 COMPLETE CBC W/AUTO DIFF WBC: CPT | Performed by: INTERNAL MEDICINE

## 2023-11-21 PROCEDURE — 78815 PET IMAGE W/CT SKULL-THIGH: CPT | Mod: PS

## 2023-11-21 PROCEDURE — 84550 ASSAY OF BLOOD/URIC ACID: CPT | Performed by: NURSE PRACTITIONER

## 2023-11-21 PROCEDURE — 87631 RESP VIRUS 3-5 TARGETS: CPT | Performed by: NURSE PRACTITIONER

## 2023-11-21 PROCEDURE — 87635 SARS-COV-2 COVID-19 AMP PRB: CPT | Mod: 59

## 2023-11-21 PROCEDURE — 82947 ASSAY GLUCOSE BLOOD QUANT: CPT | Mod: 59

## 2023-11-21 PROCEDURE — 82784 ASSAY IGA/IGD/IGG/IGM EACH: CPT | Performed by: NURSE PRACTITIONER

## 2023-11-21 PROCEDURE — 36415 COLL VENOUS BLD VENIPUNCTURE: CPT

## 2023-11-21 PROCEDURE — 87486 CHLMYD PNEUM DNA AMP PROBE: CPT

## 2023-11-21 PROCEDURE — 1160F RVW MEDS BY RX/DR IN RCRD: CPT | Performed by: NURSE PRACTITIONER

## 2023-11-21 PROCEDURE — 78815 PET IMAGE W/CT SKULL-THIGH: CPT | Mod: PET TUMOR SUBSQ TX STRATEGY | Performed by: STUDENT IN AN ORGANIZED HEALTH CARE EDUCATION/TRAINING PROGRAM

## 2023-11-21 PROCEDURE — 85730 THROMBOPLASTIN TIME PARTIAL: CPT | Performed by: INTERNAL MEDICINE

## 2023-11-21 PROCEDURE — 1159F MED LIST DOCD IN RCRD: CPT | Performed by: NURSE PRACTITIONER

## 2023-11-21 PROCEDURE — 99215 OFFICE O/P EST HI 40 MIN: CPT | Mod: 25 | Performed by: NURSE PRACTITIONER

## 2023-11-21 PROCEDURE — 1126F AMNT PAIN NOTED NONE PRSNT: CPT | Performed by: NURSE PRACTITIONER

## 2023-11-21 PROCEDURE — 87631 RESP VIRUS 3-5 TARGETS: CPT | Mod: MUE | Performed by: NURSE PRACTITIONER

## 2023-11-21 PROCEDURE — 87634 RSV DNA/RNA AMP PROBE: CPT | Mod: 59 | Performed by: NURSE PRACTITIONER

## 2023-11-21 PROCEDURE — 83735 ASSAY OF MAGNESIUM: CPT | Performed by: INTERNAL MEDICINE

## 2023-11-21 PROCEDURE — A9552 F18 FDG: HCPCS | Performed by: INTERNAL MEDICINE

## 2023-11-21 PROCEDURE — 3430000001 HC RX 343 DIAGNOSTIC RADIOPHARMACEUTICALS: Performed by: INTERNAL MEDICINE

## 2023-11-21 PROCEDURE — 82728 ASSAY OF FERRITIN: CPT | Performed by: INTERNAL MEDICINE

## 2023-11-21 PROCEDURE — 83615 LACTATE (LD) (LDH) ENZYME: CPT | Performed by: NURSE PRACTITIONER

## 2023-11-21 PROCEDURE — 84075 ASSAY ALKALINE PHOSPHATASE: CPT | Performed by: INTERNAL MEDICINE

## 2023-11-21 PROCEDURE — 87636 SARSCOV2 & INF A&B AMP PRB: CPT | Mod: 59 | Performed by: NURSE PRACTITIONER

## 2023-11-21 PROCEDURE — 85610 PROTHROMBIN TIME: CPT | Performed by: INTERNAL MEDICINE

## 2023-11-21 PROCEDURE — 99215 OFFICE O/P EST HI 40 MIN: CPT | Performed by: NURSE PRACTITIONER

## 2023-11-21 RX ORDER — DIPHENHYDRAMINE HYDROCHLORIDE 50 MG/ML
50 INJECTION INTRAMUSCULAR; INTRAVENOUS AS NEEDED
Status: CANCELLED | OUTPATIENT
Start: 2024-01-14

## 2023-11-21 RX ORDER — FLUDEOXYGLUCOSE F 18 200 MCI/ML
10.87 INJECTION, SOLUTION INTRAVENOUS
Status: COMPLETED | OUTPATIENT
Start: 2023-11-21 | End: 2023-11-21

## 2023-11-21 RX ORDER — FAMOTIDINE 10 MG/ML
20 INJECTION INTRAVENOUS ONCE AS NEEDED
Status: CANCELLED | OUTPATIENT
Start: 2024-01-14

## 2023-11-21 RX ORDER — BENZONATATE 100 MG/1
100 CAPSULE ORAL 3 TIMES DAILY PRN
Qty: 20 CAPSULE | Refills: 0 | Status: SHIPPED | OUTPATIENT
Start: 2023-11-21 | End: 2023-11-28

## 2023-11-21 RX ORDER — ALBUTEROL SULFATE 0.83 MG/ML
3 SOLUTION RESPIRATORY (INHALATION) AS NEEDED
Status: CANCELLED | OUTPATIENT
Start: 2024-01-14

## 2023-11-21 RX ORDER — EPINEPHRINE 0.3 MG/.3ML
0.3 INJECTION SUBCUTANEOUS EVERY 5 MIN PRN
Status: CANCELLED | OUTPATIENT
Start: 2024-01-14

## 2023-11-21 RX ADMIN — FLUDEOXYGLUCOSE F 18 10.87 MILLICURIE: 200 INJECTION, SOLUTION INTRAVENOUS at 09:30

## 2023-11-21 ASSESSMENT — ENCOUNTER SYMPTOMS
CONSTITUTIONAL NEGATIVE: 1
MUSCULOSKELETAL NEGATIVE: 1
HEMATOLOGIC/LYMPHATIC NEGATIVE: 1
SHORTNESS OF BREATH: 1
NEUROLOGICAL NEGATIVE: 1
CARDIOVASCULAR NEGATIVE: 1
PSYCHIATRIC NEGATIVE: 1
ENDOCRINE NEGATIVE: 1
EYES NEGATIVE: 1
GASTROINTESTINAL NEGATIVE: 1

## 2023-11-21 NOTE — PROGRESS NOTES
Patient ID:  Rip Avalos is a 72 y.o. male.  Oncologist Dr Sue  Primary Care Provider:  Sun Yuen MD    Patient ID:  Rip Avalos is a 72 y.o. male.  Referring Physician:   No referring provider defined for this encounter.  Primary Care Provider:  Sun Yuen MD    Assessment/Plan          Problem List Items Addressed This Visit             ICD-10-CM    Immunocompromised state (CMS/HCC) D84.9    Relevant Medications    azithromycin (Zithromax) 250 mg tablet    Mantle cell lymphoma (CMS/HCC) C83.10     MCL  Diagnosed ; MIPI: N/A; ECO; KPS of 70  TP53: negative, ki-67: 10%    Treatment  BR x 4 cycles, followed by maintenance Ritux x 4 ( last dose )    Restaging  PET scan (23): increased uptake in hilar/ mediastinal LN concerning for relapsed disease  Bronchoscopy (23): biopsy consistent with MCL ( no Ki-67 or TP53 reported)  BM bx (2023) STEFFI    CART Cell therapy: (Day 0: 23) on clinical trial CASE 2422. Preparative regimen included fludarabine 30mg/m2 & cyclophosphamide 500mg/m2 on days T-4, T-3, & T-2.     Hospital course complicated by grade 3 neurotox and grade 2 CRS with fever and confusion; infectious workup negative, CTH negative for acute process, managed w/ toci x2 doses and dexamethasone taper that was completed on . Steroid induced hallucinations resolved at discharge.     Response Monitoring:  Day 30: PET scan (23) CR (Deauville 1)  Day 60: PET scan (23) CR (De[place]ville 1)  Day 90: PET scan (23) CR (Deauville 1)  6m PET scan scheduled for 23.    Infectious prophylaxis:   Antiviral prophylaxis: acyclovir, continue at least 6 months.   PCP prophylaxis: TMP-SMX DS, M/W/F; continue PJP prophylaxis at least 6 months    CMV monitoring  CMV DNA PCR (with each visit): ND 10/25/23    IgG 695 10/25/23: IVIG 23 and 10/25/23, precert not required; auth ends 23.     Anti-infective Vaccines: Initiate vaccine protocol 6 month  "post CART visit (November).          Relevant Medications    azithromycin (Zithromax) 250 mg tablet    Other Relevant Orders    CBC and Auto Differential    Comprehensive Metabolic Panel    Lactate dehydrogenase    Uric acid    IgG    CMV DNA, Quantitative, PCR, Plasma    Bilateral pulmonary embolism (CMS/HCC) I26.99     Patient presented to the ED on 6/8/23 with R sided low back pain that started acutely that evening; pain worse with inspiration. CT revealed bilateral PEs. US BLE negative for DVT.    Started on heparin drip in the ED and patient was admitted for further workup and management of PE.   Initially patient was transitioned to LMWH, however, because of prescription cost he was discharged on Eliquis and will continue for 6mths.          Hypogammaglobulinemia (CMS/HCC) - Primary D80.1    Relevant Medications    azithromycin (Zithromax) 250 mg tablet                 Subjective    History of Present Illness:  Mr Avalos presents to the clinic today with his wife for routine follow up evaluation, laboratory assessment and IVIG    2nd Covid vaccine last Thurs. Nausea. \"Felt terrible.\" Sleeping a lot. Dizziness worse. Drinking ok. Has had both flu and RSV vaccines.    Today, he states that he feels back to baseline. Appetite fine. Endurance down. Pretty active.    Cough comes and goes. Bringing up phlegm now.     Leaving for cruise on Sat x3 weeks.           Review of Systems - Oncology         Objective        Physical Exam                 "

## 2023-11-21 NOTE — PROGRESS NOTES
Patient ID:  Rip Avalos is a 72 y.o. male.  Referring Physician:   Dr Sue  Primary Care Provider:  Sun Yuen MD    Assessment/Plan      23 PET notes ongoing CR. URI while on cruise. Took Z-pack. Residual cough. Tessalon script. Hold 6m vaccines today. Reschedule in 2 weeks 23.     23 Covid + Patient aware.     Problem List Items Addressed This Visit             ICD-10-CM    Immunocompromised state (CMS/HCC) D84.9    Mantle cell lymphoma (CMS/HCC) C83.10     MCL  Diagnosed ; MIPI: N/A; ECO; KPS of 70  TP53: negative, ki-67: 10%    Treatment  BR x 4 cycles, followed by maintenance Ritux x 4 ( last dose )    Restaging  PET scan (23): increased uptake in hilar/ mediastinal LN concerning for relapsed disease  Bronchoscopy (23): biopsy consistent with MCL ( no Ki-67 or TP53 reported)  BM bx (2023) STEFFI    CART Cell therapy: (Day 0: 23) on clinical trial CASE 2422. Preparative regimen included fludarabine 30mg/m2 & cyclophosphamide 500mg/m2 on days T-4, T-3, & T-2.     Hospital course complicated by grade 3 neurotox and grade 2 CRS with fever and confusion; infectious workup negative, CTH negative for acute process, managed w/ toci x2 doses and dexamethasone taper that was completed on . Steroid induced hallucinations resolved at discharge.     Response Monitoring:  Day 30: PET scan (23) CR (Deauville 1)  Day 60: PET scan (23) CR (De[place]ville 1)  Day 90: PET scan (23) CR (Deauville 1)  6m PET scan 23 CR (Deauville 1)    Infectious prophylaxis:   Antiviral prophylaxis: acyclovir, continue at least 6 months.   PCP prophylaxis: TMP-SMX DS, M/W/F; continue PJP prophylaxis at least 6 months    CMV monitoring  CMV DNA PCR (with each visit): ND 10/25/23    IgG 695 10/25/23: IVIG 23 and 10/25/23, precert not required; auth ends 23.     Anti-infective Vaccines: Initiate vaccine protocol 6 month post CART visit (November).           Relevant Orders    Clinic Appointment Request [facility]    Infusion Appointment Request [facility] 2F STEM CELL    Hypogammaglobulinemia (CMS/[facility]) D80.1     Other Visit Diagnoses         Codes    Acute cough    -  Primary R05.1    Relevant Medications    benzonatate (Tessalon) 100 mg capsule    Other Relevant Orders    Sars-CoV-2 PCR, Symptomatic (Completed)    Influenza A, and B PCR (Completed)    Adenovirus PCR Qual For Respiratory Samples (Completed)    Atypical Pneumonia Panel    Metapneumovirus PCR (Completed)    RSV PCR (Completed)    Parainfluenza PCR (Completed)    Rhinovirus PCR, Respiratory Spec (Completed)            Subjective    History of Present Illness:  Mr Avalos presents to the clinic today with his wife.    Returned from cruise on Sunday.    Beginning 11/5 cough, decreased energy. Took Zpack beginning the 7th. No fevers.    Still cough. Clear sinus drainage.           Review of Systems   Constitutional: Negative.    HENT:  Negative.     Eyes: Negative.    Respiratory:  Positive for shortness of breath.    Cardiovascular: Negative.    Gastrointestinal: Negative.    Endocrine: Negative.    Genitourinary: Negative.          Passed kidney stone last week. Not much pain. Discomfort lower back. [place] plop in toilet.   Musculoskeletal: Negative.    Skin: Negative.    Neurological: Negative.    Hematological: Negative.    Psychiatric/Behavioral: Negative.           Objective      Physical Exam  Vitals reviewed.   Constitutional:       Appearance: Normal appearance.   HENT:      Head: Normocephalic and atraumatic.      Nose: Nose normal.      Mouth/Throat:      Mouth: Mucous membranes are moist.   Eyes:      Pupils: Pupils are equal, round, and reactive to light.      Comments: L eye patch   Cardiovascular:      Rate and Rhythm: Normal rate and regular rhythm.      Pulses: Normal pulses.      Heart sounds: Normal heart sounds.   Pulmonary:      Effort: Pulmonary effort is normal.      Breath sounds:  Normal breath sounds.   Abdominal:      General: Abdomen is flat. Bowel sounds are normal.      Palpations: Abdomen is soft.   Musculoskeletal:         General: Normal range of motion.   Skin:     General: Skin is warm and dry.   Neurological:      General: No focal deficit present.      Mental Status: He is alert and oriented to person, place, and time.   Psychiatric:         Mood and Affect: Mood normal.

## 2023-11-21 NOTE — PROGRESS NOTES
Pt ambulated to SCT unit using walking stick. Pt endorses fatigue and a productive cough which began approximately two weeks ago. Vitals obtained, blood drawn via 22 gauge PIV in right hand and sent to lab. Pt left the unit to proceed to PET CT. Pt will return to SCT unit following staging scan.

## 2023-11-21 NOTE — PROGRESS NOTES
Pt ambulated to SCT unit via cane from PET scan, accompanied by spouse. Labs drawn from current right hand peripheral IV. IV flushed with NS and catheter removed. Nasal swab collected per orders. NP Ellie Escalante came to see patient, vaccines postponed for two weeks due to cough. Pt ambulated off unit without complaints, accompanied by spouse.

## 2023-11-22 ENCOUNTER — TELEPHONE (OUTPATIENT)
Dept: OTHER | Facility: HOSPITAL | Age: 73
End: 2023-11-22
Payer: MEDICARE

## 2023-11-22 LAB
CHLAMYDIA.PNEUMONIAE PCR, VIRC: NOT DETECTED
CMV DNA SERPL NAA+PROBE-LOG IU: NORMAL {LOG_IU}/ML
LABORATORY COMMENT REPORT: NOT DETECTED
MYCOPLASMA.PNEUMONIAE PCR, VIRC: NOT DETECTED
SARS-COV-2 RNA RESP QL NAA+PROBE: DETECTED

## 2023-11-22 NOTE — TELEPHONE ENCOUNTER
Called Mr Avalos.   Symptoms started/resolved Nov 7-8. Azithromycin x5 days. Bad cough, exhausted.  Currently feels fine except for cough (worse at night). Started Tessalon today.   Encouraged to call if symptoms worsen, new shortness of breath, fever. Chronic fatigue.    PET scan Raeann 1.

## 2023-11-22 NOTE — ASSESSMENT & PLAN NOTE
MCL  Diagnosed ; MIPI: N/A; ECO; KPS of 70  TP53: negative, ki-67: 10%    Treatment  BR x 4 cycles, followed by maintenance Ritux x 4 ( last dose )    Restaging  PET scan (23): increased uptake in hilar/ mediastinal LN concerning for relapsed disease  Bronchoscopy (23): biopsy consistent with MCL ( no Ki-67 or TP53 reported)  BM bx (2023) STEFFI    CART Cell therapy: (Day 0: 23) on clinical trial CASE 2422. Preparative regimen included fludarabine 30mg/m2 & cyclophosphamide 500mg/m2 on days T-4, T-3, & T-2.     Hospital course complicated by grade 3 neurotox and grade 2 CRS with fever and confusion; infectious workup negative, CTH negative for acute process, managed w/ toci x2 doses and dexamethasone taper that was completed on . Steroid induced hallucinations resolved at discharge.     Response Monitoring:  Day 30: PET scan (23) CR ([facility] 1)  Day 60: PET scan (23) CR ([facility] 1)  Day 90: PET scan (23) CR ([facility]au[facility] 1)  6m PET scan 23 CR ([facility]auville 1)    Infectious prophylaxis:   Antiviral prophylaxis: acyclovir, continue at least 6 months.   PCP prophylaxis: TMP-SMX DS, M/W/F; continue PJP prophylaxis at least 6 months    CMV monitoring  CMV DNA PCR (with each visit): ND 10/25/23    IgG 825 23: IVIG 23 and 10/25/23, precert not required; auth ends 23.     Anti-infective Vaccines: Initiate vaccine protocol 6 month post CART visit (November).

## 2023-11-27 ENCOUNTER — APPOINTMENT (OUTPATIENT)
Dept: HEMATOLOGY/ONCOLOGY | Facility: HOSPITAL | Age: 73
End: 2023-11-27
Payer: MEDICARE

## 2023-11-27 DIAGNOSIS — C83.10 MANTLE CELL LYMPHOMA, UNSPECIFIED BODY REGION (MULTI): ICD-10-CM

## 2023-11-27 DIAGNOSIS — Z92.850 HISTORY OF CHIMERIC ANTIGEN RECEPTOR T-CELL THERAPY: Primary | ICD-10-CM

## 2023-11-27 RX ORDER — DIPHENHYDRAMINE HYDROCHLORIDE 50 MG/ML
50 INJECTION INTRAMUSCULAR; INTRAVENOUS AS NEEDED
Status: CANCELLED | OUTPATIENT
Start: 2023-12-07

## 2023-11-27 RX ORDER — FAMOTIDINE 10 MG/ML
20 INJECTION INTRAVENOUS ONCE AS NEEDED
Status: CANCELLED | OUTPATIENT
Start: 2023-12-07

## 2023-11-27 RX ORDER — ALBUTEROL SULFATE 0.83 MG/ML
3 SOLUTION RESPIRATORY (INHALATION) AS NEEDED
Status: CANCELLED | OUTPATIENT
Start: 2023-12-07

## 2023-11-27 RX ORDER — EPINEPHRINE 0.3 MG/.3ML
0.3 INJECTION SUBCUTANEOUS EVERY 5 MIN PRN
Status: CANCELLED | OUTPATIENT
Start: 2023-12-07

## 2023-12-04 ENCOUNTER — OFFICE VISIT (OUTPATIENT)
Dept: PRIMARY CARE | Facility: CLINIC | Age: 73
End: 2023-12-04
Payer: MEDICARE

## 2023-12-04 VITALS
DIASTOLIC BLOOD PRESSURE: 74 MMHG | WEIGHT: 196 LBS | HEIGHT: 70 IN | SYSTOLIC BLOOD PRESSURE: 99 MMHG | BODY MASS INDEX: 28.06 KG/M2

## 2023-12-04 DIAGNOSIS — I26.99 PULMONARY EMBOLISM, UNSPECIFIED CHRONICITY, UNSPECIFIED PULMONARY EMBOLISM TYPE, UNSPECIFIED WHETHER ACUTE COR PULMONALE PRESENT (MULTI): ICD-10-CM

## 2023-12-04 DIAGNOSIS — E34.9 TESTOSTERONE DEFICIENCY: ICD-10-CM

## 2023-12-04 DIAGNOSIS — Z79.01 CURRENT USE OF LONG TERM ANTICOAGULATION: ICD-10-CM

## 2023-12-04 DIAGNOSIS — K21.9 GASTROESOPHAGEAL REFLUX DISEASE, UNSPECIFIED WHETHER ESOPHAGITIS PRESENT: ICD-10-CM

## 2023-12-04 DIAGNOSIS — N40.0 BENIGN PROSTATIC HYPERPLASIA, UNSPECIFIED WHETHER LOWER URINARY TRACT SYMPTOMS PRESENT: ICD-10-CM

## 2023-12-04 DIAGNOSIS — Z12.5 ENCOUNTER FOR SCREENING FOR MALIGNANT NEOPLASM OF PROSTATE: ICD-10-CM

## 2023-12-04 DIAGNOSIS — I10 HYPERTENSION, UNSPECIFIED TYPE: ICD-10-CM

## 2023-12-04 DIAGNOSIS — E78.00 HYPERCHOLESTEROLEMIA: ICD-10-CM

## 2023-12-04 DIAGNOSIS — C83.10 MANTLE CELL LYMPHOMA, UNSPECIFIED BODY REGION (MULTI): Primary | ICD-10-CM

## 2023-12-04 PROCEDURE — 99213 OFFICE O/P EST LOW 20 MIN: CPT | Performed by: INTERNAL MEDICINE

## 2023-12-04 PROCEDURE — 1159F MED LIST DOCD IN RCRD: CPT | Performed by: INTERNAL MEDICINE

## 2023-12-04 PROCEDURE — 1160F RVW MEDS BY RX/DR IN RCRD: CPT | Performed by: INTERNAL MEDICINE

## 2023-12-04 PROCEDURE — 3074F SYST BP LT 130 MM HG: CPT | Performed by: INTERNAL MEDICINE

## 2023-12-04 PROCEDURE — 3078F DIAST BP <80 MM HG: CPT | Performed by: INTERNAL MEDICINE

## 2023-12-04 PROCEDURE — 1126F AMNT PAIN NOTED NONE PRSNT: CPT | Performed by: INTERNAL MEDICINE

## 2023-12-04 ASSESSMENT — ENCOUNTER SYMPTOMS
LOSS OF SENSATION IN FEET: 0
DEPRESSION: 0
OCCASIONAL FEELINGS OF UNSTEADINESS: 0

## 2023-12-05 NOTE — PROGRESS NOTES
"Subjective   Patient ID: Rip Avalos is a 73 y.o. male who presents for Follow-up (on various conditions).    Mr. Kady Avalos today came here for follow up on various conditions.  He is under cancer team after stem cell transplantation.  He is undergoing through all kind of investigation and treatment.  He has not seen his prostate specialist.    I have personally reviewed the patient's Past Medical History, Medications, Allergies, Social History, and Family History in the EMR.    Review of Systems   All other systems reviewed and are negative.    Objective   BP 99/74   Ht 1.778 m (5' 10\")   Wt 88.9 kg (196 lb)   BMI 28.12 kg/m²     Physical Exam  Vitals reviewed.   Cardiovascular:      Heart sounds: Normal heart sounds, S1 normal and S2 normal. No murmur heard.     No friction rub.   Pulmonary:      Effort: Pulmonary effort is normal.      Breath sounds: Normal breath sounds and air entry.   Abdominal:      Palpations: There is no hepatomegaly, splenomegaly or mass.   Musculoskeletal:      Right lower leg: No edema.      Left lower leg: No edema.   Lymphadenopathy:      Lower Body: No right inguinal adenopathy. No left inguinal adenopathy.   Neurological:      Cranial Nerves: Cranial nerves 2-12 are intact.      Sensory: No sensory deficit.      Motor: Motor function is intact.      Deep Tendon Reflexes: Reflexes are normal and symmetric.     LAB WORK: Laboratory testing discussed.    Assessment/Plan   Problem List Items Addressed This Visit             ICD-10-CM       Cardiac and Vasculature    Hypercholesterolemia E78.00    Relevant Orders    Comprehensive metabolic panel    Hypertension I10    Relevant Orders    CBC    Urinalysis with Reflex Microscopic    TSH       Gastrointestinal and Abdominal    Esophageal reflux K21.9       Genitourinary and Reproductive    Benign prostatic hyperplasia N40.0    Relevant Orders    Prostate Specific Antigen       Hematology and Neoplasia    Mantle cell lymphoma " (CMS/[facility]) - Primary C83.10     Other Visit Diagnoses         Codes    Testosterone deficiency     E34.9    Relevant Orders    Testosterone,Free and Total    Pulmonary embolism, unspecified chronicity, unspecified pulmonary embolism type, unspecified whether acute cor pulmonale present (CMS/[facility])     I26.99    Encounter for screening for malignant neoplasm of prostate     Z12.5    Current use of long term anticoagulation     Z79.01        1. Mantle cell cancer.  Immunotherapy, stem cell transplant ______.  2. Immune suppressant.  He is going to go through the immunization process soon.  3. Pulmonary embolism, on anticoagulation.  We will monitor.  4. GERD, on PPI.  5. Hypertension, okay.  6. Cholesterol, monitor.  7. Prostate health.  He has not seen specialist over two years.  I ordered PSA.  8. I shall see him in a week after tests.    Scribe Attestation  By signing my name below, I, Davina Baez, Jeze attest that this documentation has been prepared under the direction and in the presence of Sun Yuen MD.

## 2023-12-07 ENCOUNTER — OFFICE VISIT (OUTPATIENT)
Dept: HEMATOLOGY/ONCOLOGY | Facility: HOSPITAL | Age: 73
End: 2023-12-07
Payer: MEDICARE

## 2023-12-07 ENCOUNTER — TREATMENT (OUTPATIENT)
Dept: OTHER | Facility: HOSPITAL | Age: 73
End: 2023-12-07
Payer: MEDICARE

## 2023-12-07 VITALS
HEART RATE: 87 BPM | WEIGHT: 199.74 LBS | TEMPERATURE: 97.9 F | SYSTOLIC BLOOD PRESSURE: 137 MMHG | BODY MASS INDEX: 28.66 KG/M2 | OXYGEN SATURATION: 100 % | DIASTOLIC BLOOD PRESSURE: 62 MMHG | RESPIRATION RATE: 18 BRPM

## 2023-12-07 DIAGNOSIS — Z92.850 HISTORY OF CHIMERIC ANTIGEN RECEPTOR T-CELL THERAPY: ICD-10-CM

## 2023-12-07 DIAGNOSIS — C83.10 MANTLE CELL LYMPHOMA, UNSPECIFIED BODY REGION (MULTI): ICD-10-CM

## 2023-12-07 LAB
ALBUMIN SERPL BCP-MCNC: 4 G/DL (ref 3.4–5)
ALP SERPL-CCNC: 78 U/L (ref 33–136)
ALT SERPL W P-5'-P-CCNC: 21 U/L (ref 10–52)
ANION GAP SERPL CALC-SCNC: 13 MMOL/L (ref 10–20)
AST SERPL W P-5'-P-CCNC: 19 U/L (ref 9–39)
BASOPHILS # BLD AUTO: 0.04 X10*3/UL (ref 0–0.1)
BASOPHILS NFR BLD AUTO: 1.4 %
BILIRUB SERPL-MCNC: 0.4 MG/DL (ref 0–1.2)
BUN SERPL-MCNC: 10 MG/DL (ref 6–23)
CALCIUM SERPL-MCNC: 9 MG/DL (ref 8.6–10.3)
CHLORIDE SERPL-SCNC: 107 MMOL/L (ref 98–107)
CO2 SERPL-SCNC: 23 MMOL/L (ref 21–32)
CREAT SERPL-MCNC: 0.75 MG/DL (ref 0.5–1.3)
EOSINOPHIL # BLD AUTO: 0.09 X10*3/UL (ref 0–0.4)
EOSINOPHIL NFR BLD AUTO: 3.2 %
ERYTHROCYTE [DISTWIDTH] IN BLOOD BY AUTOMATED COUNT: 15 % (ref 11.5–14.5)
GFR SERPL CREATININE-BSD FRML MDRD: >90 ML/MIN/1.73M*2
GLUCOSE SERPL-MCNC: 116 MG/DL (ref 74–99)
HCT VFR BLD AUTO: 42.4 % (ref 41–52)
HGB BLD-MCNC: 13.7 G/DL (ref 13.5–17.5)
IGG SERPL-MCNC: 700 MG/DL (ref 700–1600)
IMM GRANULOCYTES # BLD AUTO: 0 X10*3/UL (ref 0–0.5)
IMM GRANULOCYTES NFR BLD AUTO: 0 % (ref 0–0.9)
LDH SERPL L TO P-CCNC: 124 U/L (ref 84–246)
LYMPHOCYTES # BLD AUTO: 0.35 X10*3/UL (ref 0.8–3)
LYMPHOCYTES NFR BLD AUTO: 12.3 %
MCH RBC QN AUTO: 29.7 PG (ref 26–34)
MCHC RBC AUTO-ENTMCNC: 32.3 G/DL (ref 32–36)
MCV RBC AUTO: 92 FL (ref 80–100)
MONOCYTES # BLD AUTO: 0.54 X10*3/UL (ref 0.05–0.8)
MONOCYTES NFR BLD AUTO: 19 %
NEUTROPHILS # BLD AUTO: 1.82 X10*3/UL (ref 1.6–5.5)
NEUTROPHILS NFR BLD AUTO: 64.1 %
NRBC BLD-RTO: 0 /100 WBCS (ref 0–0)
PLATELET # BLD AUTO: 153 X10*3/UL (ref 150–450)
POTASSIUM SERPL-SCNC: 4.2 MMOL/L (ref 3.5–5.3)
PROT SERPL-MCNC: 6.2 G/DL (ref 6.4–8.2)
RBC # BLD AUTO: 4.61 X10*6/UL (ref 4.5–5.9)
SODIUM SERPL-SCNC: 139 MMOL/L (ref 136–145)
URATE SERPL-MCNC: 4.5 MG/DL (ref 4–7.5)
WBC # BLD AUTO: 2.8 X10*3/UL (ref 4.4–11.3)

## 2023-12-07 PROCEDURE — 90648 HIB PRP-T VACCINE 4 DOSE IM: CPT | Mod: MUE

## 2023-12-07 PROCEDURE — 99214 OFFICE O/P EST MOD 30 MIN: CPT | Mod: 25 | Performed by: INTERNAL MEDICINE

## 2023-12-07 PROCEDURE — 36415 COLL VENOUS BLD VENIPUNCTURE: CPT

## 2023-12-07 PROCEDURE — 90472 IMMUNIZATION ADMIN EACH ADD: CPT

## 2023-12-07 PROCEDURE — 99214 OFFICE O/P EST MOD 30 MIN: CPT | Performed by: INTERNAL MEDICINE

## 2023-12-07 PROCEDURE — 2500000005 HC RX 250 GENERAL PHARMACY W/O HCPCS: Performed by: NURSE PRACTITIONER

## 2023-12-07 PROCEDURE — 2500000005 HC RX 250 GENERAL PHARMACY W/O HCPCS

## 2023-12-07 PROCEDURE — 85025 COMPLETE CBC W/AUTO DIFF WBC: CPT | Performed by: NURSE PRACTITIONER

## 2023-12-07 PROCEDURE — 1126F AMNT PAIN NOTED NONE PRSNT: CPT | Performed by: INTERNAL MEDICINE

## 2023-12-07 PROCEDURE — 83615 LACTATE (LD) (LDH) ENZYME: CPT | Performed by: NURSE PRACTITIONER

## 2023-12-07 PROCEDURE — 90750 HZV VACC RECOMBINANT IM: CPT

## 2023-12-07 PROCEDURE — 2500000004 HC RX 250 GENERAL PHARMACY W/ HCPCS (ALT 636 FOR OP/ED): Mod: MUE

## 2023-12-07 PROCEDURE — 90696 DTAP-IPV VACCINE 4-6 YRS IM: CPT | Performed by: NURSE PRACTITIONER

## 2023-12-07 PROCEDURE — 84550 ASSAY OF BLOOD/URIC ACID: CPT | Performed by: NURSE PRACTITIONER

## 2023-12-07 PROCEDURE — 2500000004 HC RX 250 GENERAL PHARMACY W/ HCPCS (ALT 636 FOR OP/ED): Performed by: NURSE PRACTITIONER

## 2023-12-07 PROCEDURE — 80053 COMPREHEN METABOLIC PANEL: CPT | Performed by: NURSE PRACTITIONER

## 2023-12-07 PROCEDURE — 90471 IMMUNIZATION ADMIN: CPT | Performed by: NURSE PRACTITIONER

## 2023-12-07 PROCEDURE — 1160F RVW MEDS BY RX/DR IN RCRD: CPT | Performed by: INTERNAL MEDICINE

## 2023-12-07 PROCEDURE — 1159F MED LIST DOCD IN RCRD: CPT | Performed by: INTERNAL MEDICINE

## 2023-12-07 PROCEDURE — 82784 ASSAY IGA/IGD/IGG/IGM EACH: CPT | Performed by: NURSE PRACTITIONER

## 2023-12-07 RX ORDER — DIPHENHYDRAMINE HYDROCHLORIDE 50 MG/ML
50 INJECTION INTRAMUSCULAR; INTRAVENOUS AS NEEDED
Status: CANCELLED | OUTPATIENT
Start: 2024-01-28

## 2023-12-07 RX ORDER — ALBUTEROL SULFATE 0.83 MG/ML
3 SOLUTION RESPIRATORY (INHALATION) AS NEEDED
Status: CANCELLED | OUTPATIENT
Start: 2024-01-28

## 2023-12-07 RX ORDER — FAMOTIDINE 10 MG/ML
20 INJECTION INTRAVENOUS ONCE AS NEEDED
Status: CANCELLED | OUTPATIENT
Start: 2024-01-28

## 2023-12-07 RX ORDER — EPINEPHRINE 0.3 MG/.3ML
0.3 INJECTION SUBCUTANEOUS EVERY 5 MIN PRN
Status: CANCELLED | OUTPATIENT
Start: 2024-01-28

## 2023-12-07 RX ADMIN — PNEUMOCOCCAL 20-VALENT CONJUGATE VACCINE 0.5 ML
2.2; 2.2; 2.2; 2.2; 2.2; 2.2; 2.2; 2.2; 2.2; 2.2; 2.2; 2.2; 2.2; 2.2; 2.2; 2.2; 4.4; 2.2; 2.2; 2.2 INJECTION, SUSPENSION INTRAMUSCULAR at 12:17

## 2023-12-07 RX ADMIN — HAEMOPHILUS B POLYSACCHARIDE CONJUGATE VACCINE FOR INJ 0.5 ML: RECON SOLN at 12:19

## 2023-12-07 RX ADMIN — DIPHTHERIA AND TETANUS TOXOIDS AND ACELLULAR PERTUSSIS ADSORBED AND INACTIVATED POLIOVIRUS VACCINE 0.5 ML: 25; 10; 25; 8; 25; 40; 8; 32 INJECTION, SUSPENSION INTRAMUSCULAR at 12:16

## 2023-12-07 RX ADMIN — Medication 0.5 ML: at 12:26

## 2023-12-07 RX ADMIN — HEPATITIS B VACCINE (RECOMBINANT) ADJUVANTED 20 MCG: 20 INJECTION, SOLUTION INTRAMUSCULAR at 12:15

## 2023-12-07 ASSESSMENT — PAIN SCALES - GENERAL: PAINLEVEL: 0-NO PAIN

## 2023-12-07 NOTE — PROGRESS NOTES
HEMATOLOGY/ONCOLOGY CLINIC NOTE      HPI:  Still fatigue/poor endurance  Cough  Hoarse voice  No fever, no sweats    Oncology History and Treatment synopsis  Oncology History Overview Note   Treatment Synopsis:    - Relapsed Mantle Cell Lymphoma, originally diagnosed  Aug 2021 with lymphadenopathy and esophageal mass, biopsy of which consistent with mantle cell lymphoma, Ki67 of 10%  - s/p Zak/Rituxan x 4 cycles, followed by maintenance Rituxan (completed Aug 2022)  - PET scan (2/6/23): multiple FDG avid nodes in mediastinum and hilar area, no LN below diaphragm  - Repeat PET scan (4/24/23): persistent thoracic LN, new intraluminal masses in transverse colon concerning for disease  - BMBx (4/6/23): negative for lymphoma  - Admitted for CART (T0= 5/16/23) prep with flu/cy on CASE 2422  - 5/16/23: CART, infused with 17.5 x10^6/Kg cells        History of Present Illness:      ID Statement:    HAYDER GILLIS is a 72 year old Male     Mantle cell lymphoma (CMS/HCC)   9/19/2023 Initial Diagnosis    Mantle cell lymphoma (CMS/HCC)         PMH:  Past Medical History:   Diagnosis Date    Acute pharyngitis, unspecified 01/31/2018    Acute pharyngitis    Acute pulmonary embolism with acute cor pulmonale, unspecified pulmonary embolism type (CMS/HCC)     Acute sinusitis, unspecified 01/31/2018    Acute sinusitis    Arthritis     Calculus of kidney 01/31/2018    Kidney stone on right side    Chest pain, unspecified 01/31/2018    Chest pain    Cystoid macular degeneration, right eye 01/31/2018    Cystoid macular edema of right eye    Dizziness and giddiness 01/31/2018    Dizziness    Gastro-esophageal reflux disease without esophagitis 01/31/2018    Esophageal reflux    Mantle cell lymphoma (CMS/HCC)     Migraine, unspecified, not intractable, without status migrainosus 01/31/2018    Migraine headache    Other fatigue 01/31/2018    Fatigue    Personal history of malignant neoplasm, unspecified     History of malignant  neoplasm    Personal history of other diseases of the circulatory system 04/15/2013    History of hypertension    Pure hypercholesterolemia, unspecified 09/24/2013    Low-density-lipoid-type (LDL) hyperlipoproteinemia    Pure hypercholesterolemia, unspecified 01/31/2018    Hypercholesterolemia    Rheumatoid arthritis, unspecified (CMS/HCC) 01/31/2018    Rheumatoid arthritis    Thrombocytopenia, unspecified (CMS/HCC) 01/31/2018    Thrombocytopenia    Unspecified osteoarthritis, unspecified site 01/31/2018    Osteoarthritis    Vitamin deficiency, unspecified 01/31/2018    Vitamin deficiency       PSH:  Past Surgical History:   Procedure Laterality Date    CT ABDOMEN PELVIS ANGIOGRAM W AND/OR WO IV CONTRAST  6/9/2023    CT ABDOMEN PELVIS ANGIOGRAM W AND/OR WO IV CONTRAST 6/9/2023 [facility] [place] [facility] CT    HAND SURGERY  09/26/2016    Hand Surgery                                                                                                                                                          IR CVC TUNNELED  5/1/2023    IR CVC TUNNELED 5/1/2023 [facility] [place] ANGIO    KIDNEY SURGERY  09/26/2016    Kidney Surgery    MR HEAD ANGIO WO IV CONTRAST  9/4/2013    MR HEAD ANGIO WO IV CONTRAST [facility]    OTHER SURGICAL HISTORY  12/07/2022    Lymph node surgery    OTHER SURGICAL HISTORY  09/26/2016    Scler Buckle Repair Sufficient Fluid Drained, Retina Inspect    OTHER SURGICAL HISTORY  09/26/2016    Root Canal       SH:  Social History     Tobacco Use    Smoking status: Never    Smokeless tobacco: Not on file   Substance Use Topics    Alcohol use: Never      has no history on file for drug use.      CURRENT MEDS:  Current Outpatient Medications on File Prior to Visit   Medication Sig Dispense Refill    acyclovir (Zovirax) 400 mg tablet TAKE 1 TABLET BY MOUTH EVERY 12 HOURS 60 tablet 3    albuterol (Ventolin HFA) 90 mcg/actuation inhaler Inhale. Take per directed      apixaban 5 mg (74 tabs) tablets,dose pack TAKE 2 TABLETS (10 MG) BY  MOUTH TWO TIMES A DAY FOR 1 WEEK THEN TAKE 1 TABLET (5 MG) TWO TIMES A DAY THEREAFTER 74 tablet 1    atorvastatin (Lipitor) 40 mg tablet TAKE 1/2 TABLET EVERY DAY 45 tablet 1    multivitamin tablet Take 1 tablet by mouth once daily.      omeprazole (PriLOSEC) 40 mg DR capsule Take 1 capsule (40 mg) by mouth once daily. 90 capsule 1    sulfamethoxazole-trimethoprim (Bactrim DS) 800-160 mg tablet TAKE 1 TABLET BY MOUTH ONCE DAILY THREE TIMES A WEEK ON MONDAY, WEDNESDAY, AND FRIDAY 39 tablet 0    [DISCONTINUED] azithromycin (Zithromax) 250 mg tablet Take 2 tabs on the first day and then 1 tab daily for 4 days if respiratory infection while on vacation. (Patient not taking: Reported on 11/21/2023) 6 tablet 0     No current facility-administered medications on file prior to visit.       PHYSICAL EXAM:  There were no vitals taken for this visit.  KPS: 90  Physical Exam  Vitals reviewed.   Constitutional:       Appearance: Normal appearance.   HENT:      Head: Normocephalic.      Mouth/Throat:      Mouth: Mucous membranes are moist.      Pharynx: Oropharynx is clear.   Eyes:      Pupils: Pupils are equal, round, and reactive to light.      Comments: Eyepatch left   Cardiovascular:      Rate and Rhythm: Normal rate and regular rhythm.   Pulmonary:      Effort: Pulmonary effort is normal. No respiratory distress.      Breath sounds: Normal breath sounds. No wheezing or rales.   Abdominal:      General: Abdomen is flat. Bowel sounds are normal. There is no distension.      Palpations: Abdomen is soft. There is no mass.   Musculoskeletal:         General: No swelling. Normal range of motion.   Skin:     General: Skin is warm.      Findings: No erythema or rash.   Neurological:      General: No focal deficit present.      Mental Status: He is alert and oriented to person, place, and time.   Psychiatric:         Mood and Affect: Mood normal.         Behavior: Behavior normal.         Thought Content: Thought content normal.  "            LAB DATA:    No components found for: \"CBC\"  Lab Results   Component Value Date    WBC 2.8 (L) 2023    WBC 4.1 (L) 2023    WBC 3.6 (L) 10/24/2023    WBC 3.3 (L) 2023    WBC 3.5 (L) 2023     Lab Results   Component Value Date    HGB 13.7 2023    HGB 13.0 (L) 2023    HGB 14.2 10/24/2023    HGB 12.8 (L) 2023    HGB 13.6 2023     Lab Results   Component Value Date     2023     2023     10/24/2023     2023     2023       Lab Results   Component Value Date    GLUCOSE 116 (H) 2023    CALCIUM 9.0 2023     2023    K 4.2 2023    CO2 23 2023     2023    BUN 10 2023    CREATININE 0.75 2023     Lab Results   Component Value Date    CREATININE 0.75 2023    CREATININE 0.82 2023    CREATININE 0.78 10/24/2023    CREATININE 0.80 2023    CREATININE CANCELED 2023       Lab Results   Component Value Date    ALT 21 2023    AST 19 2023    ALKPHOS 78 2023    BILITOT 0.4 2023       [facility]  Lab Results   Component Value Date     2023     (L) 10/24/2023     (L) 2023     (L) 2023     (L) 2023       ASSESSMENT AND PLAN  Mr. Avalos is a 71 y/o M w/ PMH of severe vertigo, HLD, GERD/ Ceja's esophagus, prostate cancer (2016) s/p brachytherapy, MCL ( dx: 2021) s/p BR x 4 followed  by maintenance ritux, unfortunately found to have evidence of relapsed disease on PET scan (2/6/23) with biopsy confirmation (23) who was referred for treatment of recurrent MCL.     Relapsed MCL  - intially diagnosed 2021; MIPI: N/A; ECO; KPS of 70  - TP53: negative, ki-67: 10%  - s/p BR x 4 cycles, followed by maintenance ritux x 4 ( last dose 2022)  - PET scan (23): increased uptake in hilar/ mediastinal LN concerning for relapsed disease  - Bronchoscopy " (2/20/23): biopsy consistent with MCL ( no Ki-67 or TP53 reported)  - BM bx (4/8/2023) STEFFI        8/29 doing well.  clinically remains in remission with good marrow function.  depression -mostly resolved.                   Plan:    CAR T Cell therapy: Currently day  + 180 (T0=5/16/23)     Treated with clinical trial CASE 2422; preparative regimen included fludarabine 30mg/m2 & cyclophosphamide 500mg/m2 on days T-4, T-3, & T-2.  Hospital course complicated by grade 3 neurotox and grade 2 CRS with fever and confusion; infectious workup negative, CTH negative for acute process, managed w/ toci x2 doses and dexamethasone taper that was completed on 5/30. Steroid induced hallucinations  resolved at discharge.      Response Monitoring:   Day 30 response monitoring: PET scan (6/13/23) notes CR ([facility] 1)   Day 60 response monitoring: PET scan (7/11/23) notes CR ([facility] 1)   Day 90 response monitoring: PET scan (8/17/23) notes CR ([facility] 1)       Clinically remains in complete ongoing remission  S   Infectious prophylaxis:    Antiviral prophylaxis: acyclovir,  continue through at least 6 months.    PCP prophylaxis: TMP-SMX DS, M/W/F;  plan to continue PJP prophylaxis through at least 6 months   Antifungal prophylaxis: fluconazole,  hold for now.     CMV monitoring:  - CMV DNA PCR (with each visit): ND 7/18/23     IgG (with each visit): 403 (8/1/23) IVIG (has not received yet) precert not required; auth ends 12/1/23.  Continue replacement      Anti-infective Vaccines: Will discuss vaccine titers and possible boost vaccines at the 6 month post CAR T visit.      Cardiac:   ECHO 4/6/23: EF 55-60%, impaired pattern of relaxation, atrial septal aneurism present   ECHO 6/15/23: EF 50-55%, large PFO seen with bubble study, atrial septal aneurism present; no clots seen     Hx HLD: continue atorvastatin  ASA 81 mg restarted at discharge         PE  Patient presented to the ED on 6/8/23 with R sided low back pain that started  acutely that evening; pain worse with inspiration. CT revealed bilateral PEs. US BLE negative for DVT.  Started on heparin drip in the ED and patient was admitted for further workup and management of PE.    Initially patient was transitioned to LMWH, however, because of prescription cost he was discharged on Eliquis and will continue for 6mths.  until end of January 23     Neuro/psych  Hx of vertigo, stable. Able to navigate at home; no falls.   Grade 3 neurotoxicity with onset 5/25/23  Steroid induced hallucinations, rapid dexamethasone taper (10 mg Q6 on 5/25, then Q8 on  5/27& q12 5/28, q24 5/29, then completed during hospitalization on 5/30/23) sx resolved.  6/20 all neuro symptoms long resolved.      Mood  6/2023 Lexapro.      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 I spent 30 minutes in the care of this patient, including preparing, chart and results review, face time and charting.         Deb Sue MD, PhD

## 2023-12-07 NOTE — PROGRESS NOTES
Pt ambulated to SCT unit without assistance with wife. Pt denies complaints or pain today. Vitals obtained, blood drawn and sent to lab. Pt received 5 vaccines, 2 in the right arm and 3 in the left. Pt tolerated injections well. Pt required no further interventions and received a copy of blood work results. Pt ambulated off unit without complaints or assistance.

## 2023-12-08 LAB
CMV DNA SERPL NAA+PROBE-LOG IU: NORMAL {LOG_IU}/ML
LABORATORY COMMENT REPORT: NOT DETECTED

## 2023-12-19 ENCOUNTER — APPOINTMENT (OUTPATIENT)
Dept: HEMATOLOGY/ONCOLOGY | Facility: HOSPITAL | Age: 73
End: 2023-12-19
Payer: MEDICARE

## 2024-01-03 ENCOUNTER — TELEPHONE (OUTPATIENT)
Dept: ADMISSION | Facility: HOSPITAL | Age: 74
End: 2024-01-03
Payer: MEDICARE

## 2024-01-03 DIAGNOSIS — I26.99 PULMONARY EMBOLISM, OTHER, UNSPECIFIED CHRONICITY, UNSPECIFIED WHETHER ACUTE COR PULMONALE PRESENT (MULTI): Primary | ICD-10-CM

## 2024-01-03 NOTE — TELEPHONE ENCOUNTER
Pharmacy calling to request eliquis prescription refill. [facility] in [place] is preferred pharmacy.

## 2024-01-05 DIAGNOSIS — I26.99 BILATERAL PULMONARY EMBOLISM (MULTI): Primary | ICD-10-CM

## 2024-01-16 ENCOUNTER — APPOINTMENT (OUTPATIENT)
Dept: HEMATOLOGY/ONCOLOGY | Facility: HOSPITAL | Age: 74
End: 2024-01-16
Payer: MEDICARE

## 2024-01-22 ENCOUNTER — OFFICE VISIT (OUTPATIENT)
Dept: PRIMARY CARE | Facility: CLINIC | Age: 74
End: 2024-01-22
Payer: MEDICARE

## 2024-01-22 VITALS
SYSTOLIC BLOOD PRESSURE: 104 MMHG | HEIGHT: 70 IN | WEIGHT: 201 LBS | BODY MASS INDEX: 28.77 KG/M2 | DIASTOLIC BLOOD PRESSURE: 74 MMHG

## 2024-01-22 DIAGNOSIS — R73.03 PRE-DIABETES: ICD-10-CM

## 2024-01-22 DIAGNOSIS — E34.9 TESTOSTERONE DEFICIENCY: ICD-10-CM

## 2024-01-22 DIAGNOSIS — I10 HYPERTENSION, UNSPECIFIED TYPE: ICD-10-CM

## 2024-01-22 DIAGNOSIS — Z12.5 ENCOUNTER FOR PROSTATE CANCER SCREENING: ICD-10-CM

## 2024-01-22 DIAGNOSIS — R37 SEXUAL DYSFUNCTION: Primary | ICD-10-CM

## 2024-01-22 DIAGNOSIS — E78.00 PURE HYPERCHOLESTEROLEMIA: ICD-10-CM

## 2024-01-22 PROCEDURE — 99214 OFFICE O/P EST MOD 30 MIN: CPT | Performed by: INTERNAL MEDICINE

## 2024-01-22 PROCEDURE — 3074F SYST BP LT 130 MM HG: CPT | Performed by: INTERNAL MEDICINE

## 2024-01-22 PROCEDURE — 1126F AMNT PAIN NOTED NONE PRSNT: CPT | Performed by: INTERNAL MEDICINE

## 2024-01-22 PROCEDURE — 1160F RVW MEDS BY RX/DR IN RCRD: CPT | Performed by: INTERNAL MEDICINE

## 2024-01-22 PROCEDURE — 3078F DIAST BP <80 MM HG: CPT | Performed by: INTERNAL MEDICINE

## 2024-01-22 PROCEDURE — 1159F MED LIST DOCD IN RCRD: CPT | Performed by: INTERNAL MEDICINE

## 2024-01-22 RX ORDER — ATORVASTATIN CALCIUM 40 MG/1
20 TABLET, FILM COATED ORAL DAILY
Qty: 45 TABLET | Refills: 1 | Status: SHIPPED | OUTPATIENT
Start: 2024-01-22

## 2024-01-22 ASSESSMENT — ENCOUNTER SYMPTOMS
LOSS OF SENSATION IN FEET: 0
OCCASIONAL FEELINGS OF UNSTEADINESS: 0
DEPRESSION: 0

## 2024-01-22 NOTE — PROGRESS NOTES
"Subjective   Patient ID: Rip Avalos is a 73 y.o. male who presents for Follow-up (on various conditions).    It is always a delight to serve Mr. Avalos.  He has undergone through the chemotherapy for lymphoma, for which now he might need IgG therapy for immune system.  Feeling okay.  Appetite and weight are okay.  No problem.  He came for follow-up on various conditions.    I have personally reviewed the patient's Past Medical History, Medications, Allergies, Social History, and Family History in the EMR.    Review of Systems   All other systems reviewed and are negative.    Objective   /74   Ht 1.778 m (5' 10\")   Wt 91.2 kg (201 lb)   BMI 28.84 kg/m²     Physical Exam  Vitals reviewed.   Cardiovascular:      Heart sounds: Normal heart sounds, S1 normal and S2 normal. No murmur heard.     No friction rub.   Pulmonary:      Effort: Pulmonary effort is normal.      Breath sounds: Normal breath sounds and air entry.   Abdominal:      Palpations: There is no hepatomegaly, splenomegaly or mass.   Musculoskeletal:      Right lower leg: No edema.      Left lower leg: No edema.   Lymphadenopathy:      Lower Body: No right inguinal adenopathy. No left inguinal adenopathy.   Neurological:      Cranial Nerves: Cranial nerves 2-12 are intact.      Sensory: No sensory deficit.      Motor: Motor function is intact.      Deep Tendon Reflexes: Reflexes are normal and symmetric.     LAB WORK: Laboratory testing discussed.    Assessment/Plan   Problem List Items Addressed This Visit             ICD-10-CM       Cardiac and Vasculature    Hypertension I10    Relevant Orders    Thyroid Stimulating Hormone    Urinalysis with Reflex Microscopic    CBC and Auto Differential     Other Visit Diagnoses         Codes    Sexual dysfunction    -  Primary R37    Pure hypercholesterolemia     E78.00    Relevant Medications    atorvastatin (Lipitor) 40 mg tablet    Other Relevant Orders    Comprehensive Metabolic Panel    Lipid " Panel    Testosterone deficiency     E34.9    Relevant Orders    Prolactin    Testosterone,Free and Total    Encounter for prostate cancer screening     Z12.5    Relevant Orders    Prostate Specific Antigen, Screen    Pre-diabetes     R73.03    Relevant Orders    Hemoglobin A1C        1. Hyperglycemia possible, type 2 diabetes.  I will check hemoglobin A1c.  2. Prostate health.  PSA is not done over a year.  I am going to order that.  3. Cholesterol, ordered.  4. Sexual dysfunction.  Testosterone level ordered.  5. Blood count, chemistry.  Monitor.  6. I shall see him back in two weeks after testing.  7. I will continue to assist cancer team with general medical care.    Scribe Attestation  By signing my name below, I, Jaz Burks attest that this documentation has been prepared under the direction and in the presence of Sun Yuen MD.

## 2024-01-26 ENCOUNTER — LAB (OUTPATIENT)
Dept: LAB | Facility: LAB | Age: 74
End: 2024-01-26
Payer: MEDICARE

## 2024-01-26 DIAGNOSIS — Z12.5 ENCOUNTER FOR PROSTATE CANCER SCREENING: ICD-10-CM

## 2024-01-26 DIAGNOSIS — R73.03 PRE-DIABETES: ICD-10-CM

## 2024-01-26 DIAGNOSIS — I10 HYPERTENSION, UNSPECIFIED TYPE: ICD-10-CM

## 2024-01-26 DIAGNOSIS — E34.9 TESTOSTERONE DEFICIENCY: ICD-10-CM

## 2024-01-26 DIAGNOSIS — E78.00 HYPERCHOLESTEROLEMIA: ICD-10-CM

## 2024-01-26 DIAGNOSIS — E78.00 PURE HYPERCHOLESTEROLEMIA: ICD-10-CM

## 2024-01-26 DIAGNOSIS — N40.0 BENIGN PROSTATIC HYPERPLASIA, UNSPECIFIED WHETHER LOWER URINARY TRACT SYMPTOMS PRESENT: ICD-10-CM

## 2024-01-26 LAB
ALBUMIN SERPL BCP-MCNC: 3.8 G/DL (ref 3.4–5)
ALP SERPL-CCNC: 84 U/L (ref 33–136)
ALT SERPL W P-5'-P-CCNC: 20 U/L (ref 10–52)
ANION GAP SERPL CALC-SCNC: 13 MMOL/L (ref 10–20)
AST SERPL W P-5'-P-CCNC: 16 U/L (ref 9–39)
BASOPHILS # BLD AUTO: 0.05 X10*3/UL (ref 0–0.1)
BASOPHILS NFR BLD AUTO: 1.5 %
BILIRUB SERPL-MCNC: 0.4 MG/DL (ref 0–1.2)
BUN SERPL-MCNC: 12 MG/DL (ref 6–23)
CALCIUM SERPL-MCNC: 9.4 MG/DL (ref 8.6–10.6)
CHLORIDE SERPL-SCNC: 107 MMOL/L (ref 98–107)
CHOLEST SERPL-MCNC: 144 MG/DL (ref 0–199)
CHOLESTEROL/HDL RATIO: 3.6
CO2 SERPL-SCNC: 27 MMOL/L (ref 21–32)
CREAT SERPL-MCNC: 0.9 MG/DL (ref 0.5–1.3)
EGFRCR SERPLBLD CKD-EPI 2021: 89 ML/MIN/1.73M*2
EOSINOPHIL # BLD AUTO: 0.08 X10*3/UL (ref 0–0.4)
EOSINOPHIL NFR BLD AUTO: 2.3 %
ERYTHROCYTE [DISTWIDTH] IN BLOOD BY AUTOMATED COUNT: 14 % (ref 11.5–14.5)
EST. AVERAGE GLUCOSE BLD GHB EST-MCNC: 111 MG/DL
GLUCOSE SERPL-MCNC: 89 MG/DL (ref 74–99)
HBA1C MFR BLD: 5.5 %
HCT VFR BLD AUTO: 42.6 % (ref 41–52)
HDLC SERPL-MCNC: 40.4 MG/DL
HGB BLD-MCNC: 14 G/DL (ref 13.5–17.5)
IMM GRANULOCYTES # BLD AUTO: 0 X10*3/UL (ref 0–0.5)
IMM GRANULOCYTES NFR BLD AUTO: 0 % (ref 0–0.9)
LDLC SERPL CALC-MCNC: 74 MG/DL
LYMPHOCYTES # BLD AUTO: 0.38 X10*3/UL (ref 0.8–3)
LYMPHOCYTES NFR BLD AUTO: 11.1 %
MCH RBC QN AUTO: 30.1 PG (ref 26–34)
MCHC RBC AUTO-ENTMCNC: 32.9 G/DL (ref 32–36)
MCV RBC AUTO: 92 FL (ref 80–100)
MONOCYTES # BLD AUTO: 0.62 X10*3/UL (ref 0.05–0.8)
MONOCYTES NFR BLD AUTO: 18.1 %
NEUTROPHILS # BLD AUTO: 2.3 X10*3/UL (ref 1.6–5.5)
NEUTROPHILS NFR BLD AUTO: 67 %
NON HDL CHOLESTEROL: 104 MG/DL (ref 0–149)
NRBC BLD-RTO: 0 /100 WBCS (ref 0–0)
PLATELET # BLD AUTO: 188 X10*3/UL (ref 150–450)
POTASSIUM SERPL-SCNC: 4.5 MMOL/L (ref 3.5–5.3)
PROLACTIN SERPL-MCNC: 6.6 UG/L (ref 2–18)
PROT SERPL-MCNC: 6.1 G/DL (ref 6.4–8.2)
PSA SERPL-MCNC: 0.31 NG/ML
PSA SERPL-MCNC: 0.31 NG/ML
RBC # BLD AUTO: 4.65 X10*6/UL (ref 4.5–5.9)
SODIUM SERPL-SCNC: 142 MMOL/L (ref 136–145)
TRIGL SERPL-MCNC: 148 MG/DL (ref 0–149)
TSH SERPL-ACNC: 3.41 MIU/L (ref 0.44–3.98)
VLDL: 30 MG/DL (ref 0–40)
WBC # BLD AUTO: 3.4 X10*3/UL (ref 4.4–11.3)

## 2024-01-26 PROCEDURE — 84146 ASSAY OF PROLACTIN: CPT

## 2024-01-26 PROCEDURE — 36415 COLL VENOUS BLD VENIPUNCTURE: CPT

## 2024-01-26 PROCEDURE — 80053 COMPREHEN METABOLIC PANEL: CPT

## 2024-01-26 PROCEDURE — 84153 ASSAY OF PSA TOTAL: CPT

## 2024-01-26 PROCEDURE — 80061 LIPID PANEL: CPT

## 2024-01-26 PROCEDURE — 83036 HEMOGLOBIN GLYCOSYLATED A1C: CPT

## 2024-01-26 PROCEDURE — G0103 PSA SCREENING: HCPCS

## 2024-01-26 PROCEDURE — 84443 ASSAY THYROID STIM HORMONE: CPT

## 2024-01-26 PROCEDURE — 85025 COMPLETE CBC W/AUTO DIFF WBC: CPT

## 2024-01-31 ENCOUNTER — LAB (OUTPATIENT)
Dept: LAB | Facility: LAB | Age: 74
End: 2024-01-31
Payer: MEDICARE

## 2024-01-31 DIAGNOSIS — E34.9 TESTOSTERONE DEFICIENCY: ICD-10-CM

## 2024-01-31 PROCEDURE — 36415 COLL VENOUS BLD VENIPUNCTURE: CPT

## 2024-01-31 PROCEDURE — 84402 ASSAY OF FREE TESTOSTERONE: CPT

## 2024-02-05 LAB
TESTOSTERONE FREE (CHAN): 45.2 PG/ML (ref 30–135)
TESTOSTERONE,TOTAL,LC-MS/MS: 312 NG/DL (ref 250–1100)

## 2024-02-08 ENCOUNTER — OFFICE VISIT (OUTPATIENT)
Dept: HEMATOLOGY/ONCOLOGY | Facility: HOSPITAL | Age: 74
End: 2024-02-08
Payer: MEDICARE

## 2024-02-08 ENCOUNTER — INFUSION (OUTPATIENT)
Dept: HEMATOLOGY/ONCOLOGY | Facility: HOSPITAL | Age: 74
End: 2024-02-08
Payer: MEDICARE

## 2024-02-08 ENCOUNTER — EDUCATION (OUTPATIENT)
Dept: OTHER | Facility: HOSPITAL | Age: 74
End: 2024-02-08

## 2024-02-08 VITALS
SYSTOLIC BLOOD PRESSURE: 121 MMHG | OXYGEN SATURATION: 99 % | HEIGHT: 68 IN | HEART RATE: 82 BPM | WEIGHT: 204.81 LBS | DIASTOLIC BLOOD PRESSURE: 67 MMHG | RESPIRATION RATE: 20 BRPM | TEMPERATURE: 96.8 F | BODY MASS INDEX: 31.04 KG/M2

## 2024-02-08 DIAGNOSIS — Z92.850 HISTORY OF CHIMERIC ANTIGEN RECEPTOR T-CELL THERAPY: ICD-10-CM

## 2024-02-08 DIAGNOSIS — C83.10 MANTLE CELL LYMPHOMA, UNSPECIFIED BODY REGION (MULTI): ICD-10-CM

## 2024-02-08 PROCEDURE — 90472 IMMUNIZATION ADMIN EACH ADD: CPT | Performed by: NURSE PRACTITIONER

## 2024-02-08 PROCEDURE — 90750 HZV VACC RECOMBINANT IM: CPT | Performed by: NURSE PRACTITIONER

## 2024-02-08 PROCEDURE — 3074F SYST BP LT 130 MM HG: CPT | Performed by: INTERNAL MEDICINE

## 2024-02-08 PROCEDURE — 99215 OFFICE O/P EST HI 40 MIN: CPT | Performed by: INTERNAL MEDICINE

## 2024-02-08 PROCEDURE — 3078F DIAST BP <80 MM HG: CPT | Performed by: INTERNAL MEDICINE

## 2024-02-08 PROCEDURE — 2500000005 HC RX 250 GENERAL PHARMACY W/O HCPCS: Performed by: NURSE PRACTITIONER

## 2024-02-08 PROCEDURE — 90696 DTAP-IPV VACCINE 4-6 YRS IM: CPT | Performed by: NURSE PRACTITIONER

## 2024-02-08 PROCEDURE — 90648 HIB PRP-T VACCINE 4 DOSE IM: CPT | Performed by: NURSE PRACTITIONER

## 2024-02-08 PROCEDURE — 96372 THER/PROPH/DIAG INJ SC/IM: CPT | Performed by: NURSE PRACTITIONER

## 2024-02-08 PROCEDURE — 90471 IMMUNIZATION ADMIN: CPT | Performed by: NURSE PRACTITIONER

## 2024-02-08 PROCEDURE — 2500000004 HC RX 250 GENERAL PHARMACY W/ HCPCS (ALT 636 FOR OP/ED): Performed by: NURSE PRACTITIONER

## 2024-02-08 PROCEDURE — 96372 THER/PROPH/DIAG INJ SC/IM: CPT

## 2024-02-08 PROCEDURE — 1126F AMNT PAIN NOTED NONE PRSNT: CPT | Performed by: INTERNAL MEDICINE

## 2024-02-08 PROCEDURE — 1159F MED LIST DOCD IN RCRD: CPT | Performed by: INTERNAL MEDICINE

## 2024-02-08 RX ORDER — DIPHENHYDRAMINE HYDROCHLORIDE 50 MG/ML
50 INJECTION INTRAMUSCULAR; INTRAVENOUS AS NEEDED
Status: CANCELLED | OUTPATIENT
Start: 2024-03-28

## 2024-02-08 RX ORDER — EPINEPHRINE 0.3 MG/.3ML
0.3 INJECTION SUBCUTANEOUS EVERY 5 MIN PRN
Status: CANCELLED | OUTPATIENT
Start: 2024-04-04

## 2024-02-08 RX ORDER — DIPHENHYDRAMINE HYDROCHLORIDE 50 MG/ML
50 INJECTION INTRAMUSCULAR; INTRAVENOUS AS NEEDED
Status: CANCELLED | OUTPATIENT
Start: 2024-04-04

## 2024-02-08 RX ORDER — ALBUTEROL SULFATE 0.83 MG/ML
3 SOLUTION RESPIRATORY (INHALATION) AS NEEDED
Status: CANCELLED | OUTPATIENT
Start: 2024-03-28

## 2024-02-08 RX ORDER — SULFAMETHOXAZOLE AND TRIMETHOPRIM 800; 160 MG/1; MG/1
1 TABLET ORAL 2 TIMES DAILY
COMMUNITY
End: 2024-02-14 | Stop reason: WASHOUT

## 2024-02-08 RX ORDER — FAMOTIDINE 10 MG/ML
20 INJECTION INTRAVENOUS ONCE AS NEEDED
Status: CANCELLED | OUTPATIENT
Start: 2024-03-28

## 2024-02-08 RX ORDER — FAMOTIDINE 10 MG/ML
20 INJECTION INTRAVENOUS ONCE AS NEEDED
Status: CANCELLED | OUTPATIENT
Start: 2024-04-04

## 2024-02-08 RX ORDER — ALBUTEROL SULFATE 0.83 MG/ML
3 SOLUTION RESPIRATORY (INHALATION) AS NEEDED
Status: CANCELLED | OUTPATIENT
Start: 2024-04-04

## 2024-02-08 RX ORDER — EPINEPHRINE 0.3 MG/.3ML
0.3 INJECTION SUBCUTANEOUS EVERY 5 MIN PRN
Status: CANCELLED | OUTPATIENT
Start: 2024-03-28

## 2024-02-08 RX ADMIN — HAEMOPHILUS B POLYSACCHARIDE CONJUGATE VACCINE FOR INJ 0.5 ML: RECON SOLN at 15:38

## 2024-02-08 RX ADMIN — Medication 0.5 ML: at 15:37

## 2024-02-08 RX ADMIN — DIPHTHERIA AND TETANUS TOXOIDS AND ACELLULAR PERTUSSIS ADSORBED AND INACTIVATED POLIOVIRUS VACCINE 0.5 ML: 25; 10; 25; 8; 25; 40; 8; 32 INJECTION, SUSPENSION INTRAMUSCULAR at 15:40

## 2024-02-08 RX ADMIN — HEPATITIS B VACCINE (RECOMBINANT) ADJUVANTED 20 MCG: 20 INJECTION, SOLUTION INTRAMUSCULAR at 15:40

## 2024-02-08 RX ADMIN — PNEUMOCOCCAL 20-VALENT CONJUGATE VACCINE 0.5 ML
2.2; 2.2; 2.2; 2.2; 2.2; 2.2; 2.2; 2.2; 2.2; 2.2; 2.2; 2.2; 2.2; 2.2; 2.2; 2.2; 4.4; 2.2; 2.2; 2.2 INJECTION, SUSPENSION INTRAMUSCULAR at 15:39

## 2024-02-08 ASSESSMENT — PAIN SCALES - GENERAL: PAINLEVEL: 0-NO PAIN

## 2024-02-08 NOTE — RESEARCH NOTES
Research Note Unscheduled Visit     Rip MAYA Avalos is enrolled on [id number] is here today for follow up visit with Dr. Daley. I reviewed with Rip that the clinical trial had some changes that required reconsent. I reviewed the changes with him and he agreed to continue participation. He signed clinical trial consent. We reviewed his next visit will be for T+12M in May.

## 2024-02-08 NOTE — PROGRESS NOTES
HEMATOLOGY/ONCOLOGY CLINIC NOTE    HPI:  He's feeling better in general, active and planing to go on a cruise later this month for 2 weeks.  Still fatigue/poor endurance.  Still having a chronic Cough  Hoarse voice  No fever, no sweats, no weight loss    Oncology History and Treatment synopsis  Oncology History Overview Note   Treatment Synopsis:    - Relapsed Mantle Cell Lymphoma, originally diagnosed  Aug 2021 with lymphadenopathy and esophageal mass, biopsy of which consistent with mantle cell lymphoma, Ki67 of 10%  - s/p Zak/Rituxan x 4 cycles, followed by maintenance Rituxan (completed Aug 2022)  - PET scan (2/6/23): multiple FDG avid nodes in mediastinum and hilar area, no LN below diaphragm  - Repeat PET scan (4/24/23): persistent thoracic LN, new intraluminal masses in transverse colon concerning for disease  - BMBx (4/6/23): negative for lymphoma  - Admitted for CART (T0= 5/16/23) prep with flu/cy on CASE 2422  - 5/16/23: CART, infused with 17.5 x10^6/Kg cells        History of Present Illness:      ID Statement:    HAYDER GILLIS is a 72 year old Male     Mantle cell lymphoma (CMS/HCC)   9/19/2023 Initial Diagnosis    Mantle cell lymphoma (CMS/HCC)         PMH:  Past Medical History:   Diagnosis Date    Acute pharyngitis, unspecified 01/31/2018    Acute pharyngitis    Acute pulmonary embolism with acute cor pulmonale, unspecified pulmonary embolism type (CMS/HCC)     Acute sinusitis, unspecified 01/31/2018    Acute sinusitis    Arthritis     Calculus of kidney 01/31/2018    Kidney stone on right side    Chest pain, unspecified 01/31/2018    Chest pain    Cystoid macular degeneration, right eye 01/31/2018    Cystoid macular edema of right eye    Dizziness and giddiness 01/31/2018    Dizziness    Gastro-esophageal reflux disease without esophagitis 01/31/2018    Esophageal reflux    Mantle cell lymphoma (CMS/HCC)     Migraine, unspecified, not intractable, without status migrainosus 01/31/2018     Migraine headache    Other fatigue 01/31/2018    Fatigue    Personal history of malignant neoplasm, unspecified     History of malignant neoplasm    Personal history of other diseases of the circulatory system 04/15/2013    History of hypertension    Pure hypercholesterolemia, unspecified 09/24/2013    Low-density-lipoid-type (LDL) hyperlipoproteinemia    Pure hypercholesterolemia, unspecified 01/31/2018    Hypercholesterolemia    Rheumatoid arthritis, unspecified (CMS/HCC) 01/31/2018    Rheumatoid arthritis    Thrombocytopenia, unspecified (CMS/HCC) 01/31/2018    Thrombocytopenia    Unspecified osteoarthritis, unspecified site 01/31/2018    Osteoarthritis    Vitamin deficiency, unspecified 01/31/2018    Vitamin deficiency       PSH:  Past Surgical History:   Procedure Laterality Date    CT ABDOMEN PELVIS ANGIOGRAM W AND/OR WO IV CONTRAST  6/9/2023    CT ABDOMEN PELVIS ANGIOGRAM W AND/OR WO IV CONTRAST 6/9/2023 [facility] CT    HAND SURGERY  09/26/2016    Hand Surgery                                                                                                                                                          IR CVC TUNNELED  5/1/2023    IR CVC TUNNELED 5/1/2023 [facility] ANGIO    KIDNEY SURGERY  09/26/2016    Kidney Surgery    MR HEAD ANGIO WO IV CONTRAST  9/4/2013    MR HEAD ANGIO WO IV CONTRAST [facility]    OTHER SURGICAL HISTORY  12/07/2022    Lymph node surgery    OTHER SURGICAL HISTORY  09/26/2016    Scler Buckle Repair Sufficient Fluid Drained, Retina Inspect    OTHER SURGICAL HISTORY  09/26/2016    Root Canal       SH:  Social History     Tobacco Use    Smoking status: Never    Smokeless tobacco: Not on file   Substance Use Topics    Alcohol use: Never      has no history on file for drug use.      CURRENT MEDS:  Current Outpatient Medications on File Prior to Visit   Medication Sig Dispense Refill    acyclovir (Zovirax) 400 mg tablet TAKE 1 TABLET BY MOUTH EVERY 12 HOURS 60 tablet 3    apixaban  "(Eliquis) 5 mg tablet Take 1 tablet (5 mg) by mouth 2 times a day. 60 tablet 11    atorvastatin (Lipitor) 40 mg tablet Take 0.5 tablets (20 mg) by mouth once daily. 45 tablet 1    multivitamin tablet Take 1 tablet by mouth once daily.      omeprazole (PriLOSEC) 40 mg DR capsule Take 1 capsule (40 mg) by mouth once daily. 90 capsule 1    sulfamethoxazole-trimethoprim (Bactrim DS) 800-160 mg tablet Take 1 tablet by mouth 2 times a day.      [DISCONTINUED] apixaban (Eliquis) 5 mg (74 tabs) tablet TAKE 2 TABLETS (10 MG) BY MOUTH TWO TIMES A DAY FOR 1 WEEK THEN TAKE 1 TABLET (5 MG) TWO TIMES A DAY THEREAFTER 74 tablet 1     Current Facility-Administered Medications on File Prior to Visit   Medication Dose Route Frequency Provider Last Rate Last Admin    [COMPLETED] diph,pertus(acel),tet,omaira (PF) (Kinrix) 25 Lf-58 mcg-10 Lf/0.5 mL vaccine 0.5 mL  0.5 mL intramuscular Once Ellie Escalante APRN-CNP   0.5 mL at 02/08/24 1540    [COMPLETED] haemophilus b conjugate (ActHIB) 10 mcg/0.5 mL vaccine 0.5 mL  0.5 mL intramuscular Once Ellie Escalante APRN-CNP   0.5 mL at 02/08/24 1538    [COMPLETED] hepatitis B (Heplisav-B) vaccine 20 mcg  0.5 mL intramuscular Once Ellie Escalante, APRN-CNP   20 mcg at 02/08/24 1540    [COMPLETED] pneumococcal conjugate vaccine, 20-valent, adult (PREVNAR 20)  0.5 mL intramuscular Once Ellie Escalante APRN-CNP   0.5 mL at 02/08/24 1539    [COMPLETED] zoster vaccine-recombinant adjuvanted (Shingrix) vaccine 0.5 mL  0.5 mL intramuscular Once Ellie Escalante APRN-CNP   0.5 mL at 02/08/24 1537       PHYSICAL EXAM:  /67 (BP Location: Left arm, Patient Position: Sitting, BP Cuff Size: Large adult)   Pulse 82   Temp 36 °C (96.8 °F) (Temporal)   Resp 20   Ht (S) 1.732 m (5' 8.19\")   Wt 92.9 kg (204 lb 12.9 oz)   SpO2 99%   BMI 30.97 kg/m²   KPS: 90  Physical Exam  Vitals reviewed.   Constitutional:       Appearance: Normal appearance.   HENT:      Head: Normocephalic.      Mouth/Throat:      Mouth: Mucous " "membranes are moist.      Pharynx: Oropharynx is clear.   Eyes:      Pupils: Pupils are equal, round, and reactive to light.      Comments: Eyepatch left   Cardiovascular:      Rate and Rhythm: Normal rate and regular rhythm.   Pulmonary:      Effort: Pulmonary effort is normal. No respiratory distress.      Breath sounds: Normal breath sounds. No wheezing or rales.   Abdominal:      General: Abdomen is flat. Bowel sounds are normal. There is no distension.      Palpations: Abdomen is soft. There is no mass.   Musculoskeletal:         General: No swelling. Normal range of motion.   Skin:     General: Skin is warm.      Findings: No erythema or rash.   Neurological:      General: No focal deficit present.      Mental Status: He is alert and oriented to person, place, and time.   Psychiatric:         Mood and Affect: Mood normal.         Behavior: Behavior normal.         Thought Content: Thought content normal.             LAB DATA:    No components found for: \"CBC\"  Lab Results   Component Value Date    WBC 3.4 (L) 01/26/2024    WBC 2.8 (L) 12/07/2023    WBC 4.1 (L) 11/21/2023    WBC 3.6 (L) 10/24/2023    WBC 3.3 (L) 09/27/2023     Lab Results   Component Value Date    HGB 14.0 01/26/2024    HGB 13.7 12/07/2023    HGB 13.0 (L) 11/21/2023    HGB 14.2 10/24/2023    HGB 12.8 (L) 09/27/2023     Lab Results   Component Value Date     01/26/2024     12/07/2023     11/21/2023     10/24/2023     09/27/2023       Lab Results   Component Value Date    GLUCOSE 89 01/26/2024    CALCIUM 9.4 01/26/2024     01/26/2024    K 4.5 01/26/2024    CO2 27 01/26/2024     01/26/2024    BUN 12 01/26/2024    CREATININE 0.90 01/26/2024     Lab Results   Component Value Date    CREATININE 0.90 01/26/2024    CREATININE 0.75 12/07/2023    CREATININE 0.82 11/21/2023    CREATININE 0.78 10/24/2023    CREATININE 0.80 09/19/2023    CREATININE CANCELED 09/19/2023       Lab Results   Component Value Date    " ALT 20 2024    AST 16 2024    ALKPHOS 84 2024    BILITOT 0.4 2024       [facility]  Lab Results   Component Value Date     2023     2023     (L) 10/24/2023     (L) 2023     (L) 2023       ASSESSMENT AND PLAN  Mr. Avalos is a 73 y/o M w/ PMH of severe vertigo, HLD, GERD/ Ceja's esophagus, prostate cancer () s/p brachytherapy, MCL ( dx: 2021) s/p BR x 4 followed  by maintenance ritux, unfortunately found to have evidence of relapsed disease on PET scan (23) with biopsy confirmation (23) who was referred for treatment of recurrent MCL.     Relapsed MCL  - intially diagnosed 2021; MIPI: N/A; ECO; KPS of 70  - TP53: negative, ki-67: 10%  - s/p BR x 4 cycles, followed by maintenance ritux x 4 ( last dose 2022)  - PET scan (23): increased uptake in hilar/ mediastinal LN concerning for relapsed disease  - Bronchoscopy (23): biopsy consistent with MCL ( no Ki-67 or TP53 reported)  - BM bx (2023) STEFFI        24 doing well.  clinically remains in remission with good marrow function.        Plan:    CAR T Cell therapy: Currently day  + 268 (T0=23)     Treated with clinical trial CASE 2422; preparative regimen included fludarabine 30mg/m2 & cyclophosphamide 500mg/m2 on days T-4, T-3, & T-2.  Hospital course complicated by grade 3 neurotox and grade 2 CRS with fever and confusion; infectious workup negative, CTH negative for acute process, managed w/ toci x2 doses and dexamethasone taper that was completed on . Steroid induced hallucinations  resolved at discharge.      Response Monitoring:   Day 30 response monitoring: PET scan (23) notes CR ([place] 1)   Day 60 response monitoring: PET scan (23) notes CR ([place] 1)   Day 90 response monitoring: PET scan (23) notes CR ([place] 1)       Clinically remains in complete ongoing remission     Infectious prophylaxis:     Antiviral prophylaxis: acyclovir,  continue through at least 12 months.    PCP prophylaxis: TMP-SMX DS, M/W/F;  plan to continue PJP prophylaxis through at least 12 months   Antifungal prophylaxis: fluconazole,  completed     CMV monitoring:  - CMV DNA PCR (with each visit): ND 7/18/23     IgG (with each visit): 403 (8/1/23) IVIG (has not received yet) precert not required; auth ends 12/1/23.  Continue replacement      Anti-infective Vaccines:   - Getting Month 9 vaccines today     Cardiac:   ECHO 4/6/23: EF 55-60%, impaired pattern of relaxation, atrial septal aneurism present   ECHO 6/15/23: EF 50-55%, large PFO seen with bubble study, atrial septal aneurism present; no clots seen     Hx HLD: continue atorvastatin  ASA 81 mg restarted at discharge         PE  Patient presented to the ED on 6/8/23 with R sided low back pain that started acutely that evening; pain worse with inspiration. CT revealed bilateral PEs. US BLE negative for DVT.  Started on heparin drip in the ED and patient was admitted for further workup and management of PE.    Initially patient was transitioned to LMWH, however, because of prescription cost he was discharged on Eliquis and will continue for 6mths.  until end of January 23, since it's deemed provoked in nature  - Completed Eliquis, can stop now  - instructed to avoid excessive immobility or long travel with no breaks.     Neuro/psych  Hx of vertigo, stable. Able to navigate at home; no falls.   Grade 3 neurotoxicity with onset 5/25/23  Steroid induced hallucinations, rapid dexamethasone taper (10 mg Q6 on 5/25, then Q8 on  5/27& q12 5/28, q24 5/29, then completed during hospitalization on 5/30/23) sx resolved.  6/20 all neuro symptoms long resolved.      Mood  6/2023 Lexapro.      Ophthalmic  Hx of L eye corneal abrasion, occular hypertension  s/p surgical intervention w/ scleral buckling (1993), photocoagulation (1993)  Legally blind in L eye, now managed w/ eye patch     RTC in May,  or earlier if needed      Discussed with Dr. Moira MD  PGY6 hematology Oncology Fellow        I saw and evaluated the patient. I personally obtained the key and critical portions of the history and physical exam or was physically present for key and critical portions performed by the resident/fellow. I reviewed the resident/fellow's documentation and discussed the patient with the resident/fellow. I agree with the resident/fellow's medical decision making as documented in the note.    Deb Sue MD PhD

## 2024-02-12 ENCOUNTER — TELEPHONE (OUTPATIENT)
Dept: OTHER | Facility: HOSPITAL | Age: 74
End: 2024-02-12
Payer: MEDICARE

## 2024-02-12 NOTE — RESEARCH NOTES
I called back Mr. Avalos and spoke to him and his wife. We discussed that he needs refills for Bactrim, acyclovir and the new script for zpak. They would like the Acyclovir sent to the [facility] pharmacy and the Bactrim and Zpak sent to [facility] pharmacy. Dr. Daley and Ellie Escalante notified.

## 2024-02-14 ENCOUNTER — TELEPHONE (OUTPATIENT)
Dept: ADMISSION | Facility: HOSPITAL | Age: 74
End: 2024-02-14
Payer: MEDICARE

## 2024-02-14 DIAGNOSIS — C83.10 MANTLE CELL LYMPHOMA, UNSPECIFIED BODY REGION (MULTI): Primary | ICD-10-CM

## 2024-02-14 RX ORDER — SULFAMETHOXAZOLE AND TRIMETHOPRIM 800; 160 MG/1; MG/1
1 TABLET ORAL 2 TIMES DAILY
Status: CANCELLED | OUTPATIENT
Start: 2024-02-14

## 2024-02-14 RX ORDER — SULFAMETHOXAZOLE AND TRIMETHOPRIM 800; 160 MG/1; MG/1
1 TABLET ORAL 3 TIMES WEEKLY
Qty: 36 TABLET | Refills: 1 | Status: SHIPPED | OUTPATIENT
Start: 2024-02-14 | End: 2024-05-14 | Stop reason: ALTCHOICE

## 2024-02-14 RX ORDER — ACYCLOVIR 400 MG/1
400 TABLET ORAL EVERY 12 HOURS
Qty: 180 TABLET | Refills: 3 | Status: SHIPPED | OUTPATIENT
Start: 2024-02-14 | End: 2024-02-15 | Stop reason: SDUPTHER

## 2024-02-14 RX ORDER — AZITHROMYCIN 250 MG/1
250 TABLET, FILM COATED ORAL DAILY
Qty: 6 TABLET | Refills: 0 | Status: SHIPPED | OUTPATIENT
Start: 2024-02-14 | End: 2024-02-19

## 2024-02-14 NOTE — TELEPHONE ENCOUNTER
Pharmacist calling to clarify azithromycin written dispense 6, take 1 daily x 5 days. Message sent to Boris NP and fellow on call.

## 2024-02-15 ENCOUNTER — OFFICE VISIT (OUTPATIENT)
Dept: PRIMARY CARE | Facility: CLINIC | Age: 74
End: 2024-02-15
Payer: MEDICARE

## 2024-02-15 VITALS
WEIGHT: 206.4 LBS | DIASTOLIC BLOOD PRESSURE: 60 MMHG | SYSTOLIC BLOOD PRESSURE: 100 MMHG | HEIGHT: 70 IN | BODY MASS INDEX: 29.55 KG/M2

## 2024-02-15 DIAGNOSIS — C83.10 MANTLE CELL LYMPHOMA, UNSPECIFIED BODY REGION (MULTI): ICD-10-CM

## 2024-02-15 DIAGNOSIS — K21.9 GASTROESOPHAGEAL REFLUX DISEASE, UNSPECIFIED WHETHER ESOPHAGITIS PRESENT: Primary | ICD-10-CM

## 2024-02-15 DIAGNOSIS — E78.00 PURE HYPERCHOLESTEROLEMIA: ICD-10-CM

## 2024-02-15 DIAGNOSIS — I10 HYPERTENSION, UNSPECIFIED TYPE: ICD-10-CM

## 2024-02-15 PROCEDURE — 3078F DIAST BP <80 MM HG: CPT | Performed by: INTERNAL MEDICINE

## 2024-02-15 PROCEDURE — 1159F MED LIST DOCD IN RCRD: CPT | Performed by: INTERNAL MEDICINE

## 2024-02-15 PROCEDURE — 1126F AMNT PAIN NOTED NONE PRSNT: CPT | Performed by: INTERNAL MEDICINE

## 2024-02-15 PROCEDURE — 99213 OFFICE O/P EST LOW 20 MIN: CPT | Performed by: INTERNAL MEDICINE

## 2024-02-15 PROCEDURE — 3074F SYST BP LT 130 MM HG: CPT | Performed by: INTERNAL MEDICINE

## 2024-02-15 RX ORDER — ACYCLOVIR 400 MG/1
400 TABLET ORAL EVERY 12 HOURS
Qty: 180 TABLET | Refills: 3 | Status: SHIPPED | OUTPATIENT
Start: 2024-02-15 | End: 2024-05-14 | Stop reason: ALTCHOICE

## 2024-02-15 ASSESSMENT — ENCOUNTER SYMPTOMS
DEPRESSION: 0
OCCASIONAL FEELINGS OF UNSTEADINESS: 0
LOSS OF SENSATION IN FEET: 0

## 2024-02-16 NOTE — PROGRESS NOTES
"Subjective   Patient ID: Rip Avalos is a 73 y.o. male who presents for Follow-up.    1. It is always a delight to serve Mr. Avalos.  Things are looking much better.  Cancer is okay, in remission.  He came for follow-up on various conditions.  2. He is not taking anticoagulation anymore.    I have personally reviewed the patient's Past Medical History, Medications, Allergies, Social History, and Family History in the EMR.    Review of Systems   All other systems reviewed and are negative.    Objective   /60   Ht 1.778 m (5' 10\")   Wt 93.6 kg (206 lb 6.4 oz)   BMI 29.62 kg/m²     Physical Exam  Vitals reviewed.   Cardiovascular:      Heart sounds: Normal heart sounds, S1 normal and S2 normal. No murmur heard.     No friction rub.   Pulmonary:      Effort: Pulmonary effort is normal.      Breath sounds: Normal breath sounds and air entry.   Abdominal:      Palpations: There is no hepatomegaly, splenomegaly or mass.   Musculoskeletal:      Right lower leg: No edema.      Left lower leg: No edema.   Lymphadenopathy:      Lower Body: No right inguinal adenopathy. No left inguinal adenopathy.   Neurological:      Cranial Nerves: Cranial nerves 2-12 are intact.      Sensory: No sensory deficit.      Motor: Motor function is intact.      Deep Tendon Reflexes: Reflexes are normal and symmetric.     LAB WORK: Laboratory testing discussed.    Assessment/Plan   Problem List Items Addressed This Visit             ICD-10-CM       Cardiac and Vasculature    Hypertension I10    Relevant Orders    CBC    Thyroid Stimulating Hormone    Urinalysis with Reflex Microscopic       Gastrointestinal and Abdominal    Esophageal reflux - Primary K21.9     Other Visit Diagnoses         Codes    Pure hypercholesterolemia     E78.00    Relevant Orders    Comprehensive Metabolic Panel    Lipid Panel        1. High cholesterol, on medication.  2. Hypertension, okay.  3. GERD, on PPI.  4. No anticoagulation needed.  5. Follow-up " in three months.  Happy to see him anytime sooner if necessary.    Scribe Attestation  By signing my name below, I, Jaz Burks attest that this documentation has been prepared under the direction and in the presence of Sun Yuen MD.

## 2024-04-04 ENCOUNTER — INFUSION (OUTPATIENT)
Dept: HEMATOLOGY/ONCOLOGY | Facility: HOSPITAL | Age: 74
End: 2024-04-04
Payer: MEDICARE

## 2024-04-04 VITALS
DIASTOLIC BLOOD PRESSURE: 76 MMHG | SYSTOLIC BLOOD PRESSURE: 132 MMHG | BODY MASS INDEX: 29.01 KG/M2 | HEART RATE: 90 BPM | WEIGHT: 202.16 LBS | TEMPERATURE: 97.9 F | OXYGEN SATURATION: 97 % | RESPIRATION RATE: 16 BRPM

## 2024-04-04 DIAGNOSIS — C83.10 MANTLE CELL LYMPHOMA, UNSPECIFIED BODY REGION (MULTI): ICD-10-CM

## 2024-04-04 DIAGNOSIS — Z92.850 HISTORY OF CHIMERIC ANTIGEN RECEPTOR T-CELL THERAPY: ICD-10-CM

## 2024-04-04 PROCEDURE — 96372 THER/PROPH/DIAG INJ SC/IM: CPT | Performed by: NURSE PRACTITIONER

## 2024-04-04 PROCEDURE — 96372 THER/PROPH/DIAG INJ SC/IM: CPT

## 2024-04-04 PROCEDURE — 90696 DTAP-IPV VACCINE 4-6 YRS IM: CPT | Performed by: NURSE PRACTITIONER

## 2024-04-04 PROCEDURE — 90648 HIB PRP-T VACCINE 4 DOSE IM: CPT | Performed by: NURSE PRACTITIONER

## 2024-04-04 PROCEDURE — 2500000004 HC RX 250 GENERAL PHARMACY W/ HCPCS (ALT 636 FOR OP/ED): Performed by: NURSE PRACTITIONER

## 2024-04-04 PROCEDURE — 90472 IMMUNIZATION ADMIN EACH ADD: CPT | Performed by: NURSE PRACTITIONER

## 2024-04-04 PROCEDURE — 90471 IMMUNIZATION ADMIN: CPT | Performed by: NURSE PRACTITIONER

## 2024-04-04 PROCEDURE — 2500000005 HC RX 250 GENERAL PHARMACY W/O HCPCS: Performed by: NURSE PRACTITIONER

## 2024-04-04 RX ORDER — ALBUTEROL SULFATE 0.83 MG/ML
3 SOLUTION RESPIRATORY (INHALATION) AS NEEDED
OUTPATIENT
Start: 2024-05-30

## 2024-04-04 RX ORDER — DIPHENHYDRAMINE HYDROCHLORIDE 50 MG/ML
50 INJECTION INTRAMUSCULAR; INTRAVENOUS AS NEEDED
OUTPATIENT
Start: 2024-05-30

## 2024-04-04 RX ORDER — EPINEPHRINE 0.3 MG/.3ML
0.3 INJECTION SUBCUTANEOUS EVERY 5 MIN PRN
OUTPATIENT
Start: 2024-05-30

## 2024-04-04 RX ORDER — FAMOTIDINE 10 MG/ML
20 INJECTION INTRAVENOUS ONCE AS NEEDED
OUTPATIENT
Start: 2024-05-30

## 2024-04-04 RX ADMIN — PNEUMOCOCCAL 20-VALENT CONJUGATE VACCINE 0.5 ML
2.2; 2.2; 2.2; 2.2; 2.2; 2.2; 2.2; 2.2; 2.2; 2.2; 2.2; 2.2; 2.2; 2.2; 2.2; 2.2; 4.4; 2.2; 2.2; 2.2 INJECTION, SUSPENSION INTRAMUSCULAR at 14:02

## 2024-04-04 RX ADMIN — DIPHTHERIA AND TETANUS TOXOIDS AND ACELLULAR PERTUSSIS ADSORBED AND INACTIVATED POLIOVIRUS VACCINE 0.5 ML: 25; 10; 25; 8; 25; 40; 8; 32 INJECTION, SUSPENSION INTRAMUSCULAR at 14:04

## 2024-04-04 RX ADMIN — HAEMOPHILUS B POLYSACCHARIDE CONJUGATE VACCINE FOR INJ 0.5 ML: RECON SOLN at 14:03

## 2024-04-04 ASSESSMENT — PAIN SCALES - GENERAL: PAINLEVEL: 0-NO PAIN

## 2024-04-04 NOTE — PROGRESS NOTES
Pt arrived for vaccine apt ambulatory with cane. Tolerated injections without complications. Discharged ambulatory in stable condition.

## 2024-04-15 ENCOUNTER — CLINICAL SUPPORT (OUTPATIENT)
Dept: AUDIOLOGY | Facility: CLINIC | Age: 74
End: 2024-04-15
Payer: MEDICARE

## 2024-04-15 DIAGNOSIS — H81.319 VERTIGO, AURAL, UNSPECIFIED LATERALITY: ICD-10-CM

## 2024-04-15 DIAGNOSIS — H90.3 SENSORINEURAL HEARING LOSS (SNHL) OF BOTH EARS: ICD-10-CM

## 2024-04-15 PROCEDURE — 92550 TYMPANOMETRY & REFLEX THRESH: CPT | Performed by: SOCIAL WORKER

## 2024-04-15 PROCEDURE — 92557 COMPREHENSIVE HEARING TEST: CPT | Performed by: SOCIAL WORKER

## 2024-04-15 NOTE — PROGRESS NOTES
Name: Rip Avalos  YOB: 1950  Age: 73 y.o.    Date of Evaluation:  4/15/24    History:  Reason for visit:  Rip Avalos is seen today at the request of Sun Yuen MDfor an evaluation of hearing.  Patient complains of Hearing Loss. A previous evaluation was performed on 4/12/23.  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 The remainder of the frequencies in the left ear are borderline normal hearing.  Word recognition testing was completed using recorded speech at the patient's most comfortable level as documented on the audiogram.  Scores were 100% correct in the right ear and 96% correct in the left ear.    Summary:  Today's results are consistent with slight/borderline normal hearing levels in the right ear and a mild sensorineural hearing loss at 1385-4409 Hz in the left ear. The remainder of the frequencies in the left ear are borderline normal hearing.    Treatment Plan:  Follow up with PCP as directed  Retest hearing in 2 years due to stable hearing loss

## 2024-04-22 ENCOUNTER — OFFICE VISIT (OUTPATIENT)
Dept: OPHTHALMOLOGY | Facility: CLINIC | Age: 74
End: 2024-04-22
Payer: MEDICARE

## 2024-04-22 DIAGNOSIS — H40.001 GLAUCOMA SUSPECT, RIGHT EYE: ICD-10-CM

## 2024-04-22 DIAGNOSIS — H40.001 GLAUCOMA SUSPECT OF RIGHT EYE: Primary | ICD-10-CM

## 2024-04-22 DIAGNOSIS — H40.32X3 GLAUCOMA OF LEFT EYE ASSOCIATED WITH OCULAR TRAUMA, SEVERE STAGE: ICD-10-CM

## 2024-04-22 DIAGNOSIS — H33.22 LEFT RETINAL DETACHMENT: ICD-10-CM

## 2024-04-22 PROCEDURE — 92083 EXTENDED VISUAL FIELD XM: CPT | Performed by: STUDENT IN AN ORGANIZED HEALTH CARE EDUCATION/TRAINING PROGRAM

## 2024-04-22 PROCEDURE — 92012 INTRM OPH EXAM EST PATIENT: CPT | Performed by: STUDENT IN AN ORGANIZED HEALTH CARE EDUCATION/TRAINING PROGRAM

## 2024-04-22 ASSESSMENT — ENCOUNTER SYMPTOMS
NEUROLOGICAL NEGATIVE: 0
MUSCULOSKELETAL NEGATIVE: 0
ENDOCRINE NEGATIVE: 0
HEMATOLOGIC/LYMPHATIC NEGATIVE: 0
GASTROINTESTINAL NEGATIVE: 0
CONSTITUTIONAL NEGATIVE: 0
ALLERGIC/IMMUNOLOGIC NEGATIVE: 0
RESPIRATORY NEGATIVE: 0
CARDIOVASCULAR NEGATIVE: 0
EYES NEGATIVE: 0
PSYCHIATRIC NEGATIVE: 0

## 2024-04-22 ASSESSMENT — CONF VISUAL FIELD
OD_INFERIOR_TEMPORAL_RESTRICTION: 0
OS_INFERIOR_TEMPORAL_RESTRICTION: 1
OS_INFERIOR_NASAL_RESTRICTION: 1
OD_SUPERIOR_NASAL_RESTRICTION: 0
OD_SUPERIOR_TEMPORAL_RESTRICTION: 0
OD_INFERIOR_NASAL_RESTRICTION: 0
OS_SUPERIOR_NASAL_RESTRICTION: 1
OS_SUPERIOR_TEMPORAL_RESTRICTION: 1
OD_NORMAL: 1

## 2024-04-22 ASSESSMENT — VISUAL ACUITY
OD_CC: 20/20
METHOD: SNELLEN - LINEAR
CORRECTION_TYPE: GLASSES
OS_CC: NLP

## 2024-04-22 ASSESSMENT — CUP TO DISC RATIO: OD_RATIO: .40

## 2024-04-22 ASSESSMENT — TONOMETRY
OD_IOP_MMHG: 20
OS_IOP_MMHG: 30
IOP_METHOD: GOLDMANN APPLANATION

## 2024-04-22 ASSESSMENT — SLIT LAMP EXAM - LIDS
COMMENTS: MGD UL/LL
COMMENTS: MGD UL/LL

## 2024-04-22 ASSESSMENT — EXTERNAL EXAM - LEFT EYE: OS_EXAM: DERMATOCHALASIS UL

## 2024-04-22 ASSESSMENT — EXTERNAL EXAM - RIGHT EYE: OD_EXAM: DERMATOCHALASIS UL

## 2024-04-22 NOTE — PROGRESS NOTES
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 Monocular precautions discussed with FTW and darya.             RTC 6 months for annual with DFE, MRX, and OCT RNFL (OD only)

## 2024-04-26 DIAGNOSIS — C83.10 MANTLE CELL LYMPHOMA, UNSPECIFIED BODY REGION (MULTI): Primary | ICD-10-CM

## 2024-05-13 ENCOUNTER — LAB (OUTPATIENT)
Dept: LAB | Facility: LAB | Age: 74
End: 2024-05-13
Payer: MEDICARE

## 2024-05-13 DIAGNOSIS — I10 HYPERTENSION, UNSPECIFIED TYPE: ICD-10-CM

## 2024-05-13 DIAGNOSIS — I26.99 BILATERAL PULMONARY EMBOLISM (MULTI): ICD-10-CM

## 2024-05-13 DIAGNOSIS — E78.00 PURE HYPERCHOLESTEROLEMIA: ICD-10-CM

## 2024-05-13 DIAGNOSIS — Z92.850 HISTORY OF CHIMERIC ANTIGEN RECEPTOR T-CELL THERAPY: Primary | ICD-10-CM

## 2024-05-13 DIAGNOSIS — Z85.72 PERSONAL HISTORY OF LYMPHOMA: ICD-10-CM

## 2024-05-13 DIAGNOSIS — D80.1 HYPOGAMMAGLOBULINEMIA (MULTI): ICD-10-CM

## 2024-05-13 LAB
ALBUMIN SERPL BCP-MCNC: 3.9 G/DL (ref 3.4–5)
ALP SERPL-CCNC: 111 U/L (ref 33–136)
ALT SERPL W P-5'-P-CCNC: 20 U/L (ref 10–52)
ANION GAP SERPL CALC-SCNC: 14 MMOL/L (ref 10–20)
APPEARANCE UR: CLEAR
AST SERPL W P-5'-P-CCNC: 15 U/L (ref 9–39)
BILIRUB SERPL-MCNC: 0.5 MG/DL (ref 0–1.2)
BILIRUB UR STRIP.AUTO-MCNC: NEGATIVE MG/DL
BUN SERPL-MCNC: 12 MG/DL (ref 6–23)
CALCIUM SERPL-MCNC: 8.7 MG/DL (ref 8.6–10.6)
CHLORIDE SERPL-SCNC: 106 MMOL/L (ref 98–107)
CHOLEST SERPL-MCNC: 149 MG/DL (ref 0–199)
CHOLESTEROL/HDL RATIO: 3.7
CO2 SERPL-SCNC: 25 MMOL/L (ref 21–32)
COLOR UR: NORMAL
CREAT SERPL-MCNC: 0.79 MG/DL (ref 0.5–1.3)
EGFRCR SERPLBLD CKD-EPI 2021: >90 ML/MIN/1.73M*2
ERYTHROCYTE [DISTWIDTH] IN BLOOD BY AUTOMATED COUNT: 13.6 % (ref 11.5–14.5)
GLUCOSE SERPL-MCNC: 81 MG/DL (ref 74–99)
GLUCOSE UR STRIP.AUTO-MCNC: NORMAL MG/DL
HCT VFR BLD AUTO: 43.5 % (ref 41–52)
HDLC SERPL-MCNC: 40.6 MG/DL
HGB BLD-MCNC: 14.6 G/DL (ref 13.5–17.5)
KETONES UR STRIP.AUTO-MCNC: NEGATIVE MG/DL
LDLC SERPL CALC-MCNC: 87 MG/DL
LEUKOCYTE ESTERASE UR QL STRIP.AUTO: NEGATIVE
MCH RBC QN AUTO: 30.6 PG (ref 26–34)
MCHC RBC AUTO-ENTMCNC: 33.6 G/DL (ref 32–36)
MCV RBC AUTO: 91 FL (ref 80–100)
NITRITE UR QL STRIP.AUTO: NEGATIVE
NON HDL CHOLESTEROL: 108 MG/DL (ref 0–149)
NRBC BLD-RTO: 0 /100 WBCS (ref 0–0)
PH UR STRIP.AUTO: 6 [PH]
PLATELET # BLD AUTO: 194 X10*3/UL (ref 150–450)
POTASSIUM SERPL-SCNC: 4.2 MMOL/L (ref 3.5–5.3)
PROT SERPL-MCNC: 6 G/DL (ref 6.4–8.2)
PROT UR STRIP.AUTO-MCNC: NEGATIVE MG/DL
RBC # BLD AUTO: 4.77 X10*6/UL (ref 4.5–5.9)
RBC # UR STRIP.AUTO: NEGATIVE /UL
SODIUM SERPL-SCNC: 141 MMOL/L (ref 136–145)
SP GR UR STRIP.AUTO: 1.01
TRIGL SERPL-MCNC: 105 MG/DL (ref 0–149)
TSH SERPL-ACNC: 2.24 MIU/L (ref 0.44–3.98)
UROBILINOGEN UR STRIP.AUTO-MCNC: NORMAL MG/DL
VLDL: 21 MG/DL (ref 0–40)
WBC # BLD AUTO: 5.4 X10*3/UL (ref 4.4–11.3)

## 2024-05-13 PROCEDURE — 84443 ASSAY THYROID STIM HORMONE: CPT

## 2024-05-13 PROCEDURE — 81003 URINALYSIS AUTO W/O SCOPE: CPT

## 2024-05-13 PROCEDURE — 80053 COMPREHEN METABOLIC PANEL: CPT

## 2024-05-13 PROCEDURE — 80061 LIPID PANEL: CPT

## 2024-05-13 PROCEDURE — 36415 COLL VENOUS BLD VENIPUNCTURE: CPT

## 2024-05-13 PROCEDURE — 85027 COMPLETE CBC AUTOMATED: CPT

## 2024-05-14 ENCOUNTER — DOCUMENTATION (OUTPATIENT)
Dept: OTHER | Facility: HOSPITAL | Age: 74
End: 2024-05-14

## 2024-05-14 ENCOUNTER — HOSPITAL ENCOUNTER (OUTPATIENT)
Dept: RADIOLOGY | Facility: HOSPITAL | Age: 74
Discharge: HOME | End: 2024-05-14
Payer: MEDICARE

## 2024-05-14 ENCOUNTER — OFFICE VISIT (OUTPATIENT)
Dept: HEMATOLOGY/ONCOLOGY | Facility: HOSPITAL | Age: 74
End: 2024-05-14
Payer: MEDICARE

## 2024-05-14 ENCOUNTER — LAB (OUTPATIENT)
Dept: OTHER | Facility: HOSPITAL | Age: 74
End: 2024-05-14
Payer: MEDICARE

## 2024-05-14 VITALS
DIASTOLIC BLOOD PRESSURE: 77 MMHG | SYSTOLIC BLOOD PRESSURE: 144 MMHG | HEART RATE: 88 BPM | OXYGEN SATURATION: 96 % | WEIGHT: 203.48 LBS | RESPIRATION RATE: 18 BRPM | BODY MASS INDEX: 29.2 KG/M2 | TEMPERATURE: 97.7 F

## 2024-05-14 DIAGNOSIS — D84.9 IMMUNOCOMPROMISED STATE (MULTI): ICD-10-CM

## 2024-05-14 DIAGNOSIS — D80.1 HYPOGAMMAGLOBULINEMIA (MULTI): ICD-10-CM

## 2024-05-14 DIAGNOSIS — Z92.21 PERSONAL HISTORY OF ANTINEOPLASTIC CHEMOTHERAPY: ICD-10-CM

## 2024-05-14 DIAGNOSIS — Z85.72 PERSONAL HISTORY OF LYMPHOMA: ICD-10-CM

## 2024-05-14 DIAGNOSIS — C83.10 MANTLE CELL LYMPHOMA, UNSPECIFIED BODY REGION (MULTI): Primary | ICD-10-CM

## 2024-05-14 DIAGNOSIS — C83.10 MANTLE CELL LYMPHOMA, UNSPECIFIED BODY REGION (MULTI): ICD-10-CM

## 2024-05-14 DIAGNOSIS — I26.99 BILATERAL PULMONARY EMBOLISM (MULTI): ICD-10-CM

## 2024-05-14 DIAGNOSIS — Z92.850 HISTORY OF CHIMERIC ANTIGEN RECEPTOR T-CELL THERAPY: ICD-10-CM

## 2024-05-14 DIAGNOSIS — R79.1 ABNORMAL COAGULATION PROFILE: ICD-10-CM

## 2024-05-14 LAB
ALBUMIN SERPL BCP-MCNC: 4.1 G/DL (ref 3.4–5)
ALP SERPL-CCNC: 105 U/L (ref 33–136)
ALT SERPL W P-5'-P-CCNC: 19 U/L (ref 10–52)
ANION GAP SERPL CALC-SCNC: 12 MMOL/L (ref 10–20)
AST SERPL W P-5'-P-CCNC: 15 U/L (ref 9–39)
BASOPHILS # BLD AUTO: 0.05 X10*3/UL (ref 0–0.1)
BASOPHILS NFR BLD AUTO: 1 %
BILIRUB SERPL-MCNC: 0.6 MG/DL (ref 0–1.2)
BUN SERPL-MCNC: 12 MG/DL (ref 6–23)
CALCIUM SERPL-MCNC: 9 MG/DL (ref 8.6–10.3)
CHLORIDE SERPL-SCNC: 106 MMOL/L (ref 98–107)
CO2 SERPL-SCNC: 26 MMOL/L (ref 21–32)
CREAT SERPL-MCNC: 0.81 MG/DL (ref 0.5–1.3)
EGFRCR SERPLBLD CKD-EPI 2021: >90 ML/MIN/1.73M*2
EOSINOPHIL # BLD AUTO: 0.19 X10*3/UL (ref 0–0.4)
EOSINOPHIL NFR BLD AUTO: 3.7 %
ERYTHROCYTE [DISTWIDTH] IN BLOOD BY AUTOMATED COUNT: 14 % (ref 11.5–14.5)
GLUCOSE BLD MANUAL STRIP-MCNC: 96 MG/DL (ref 74–99)
GLUCOSE SERPL-MCNC: 95 MG/DL (ref 74–99)
HCT VFR BLD AUTO: 43.6 % (ref 41–52)
HGB BLD-MCNC: 14.8 G/DL (ref 13.5–17.5)
IGG SERPL-MCNC: 390 MG/DL (ref 700–1600)
IMM GRANULOCYTES # BLD AUTO: 0.02 X10*3/UL (ref 0–0.5)
IMM GRANULOCYTES NFR BLD AUTO: 0.4 % (ref 0–0.9)
INR PPP: 1 (ref 0.9–1.1)
LYMPHOCYTES # BLD AUTO: 0.48 X10*3/UL (ref 0.8–3)
LYMPHOCYTES NFR BLD AUTO: 9.4 %
MAGNESIUM SERPL-MCNC: 2 MG/DL (ref 1.6–2.4)
MCH RBC QN AUTO: 31 PG (ref 26–34)
MCHC RBC AUTO-ENTMCNC: 33.9 G/DL (ref 32–36)
MCV RBC AUTO: 91 FL (ref 80–100)
MONOCYTES # BLD AUTO: 0.51 X10*3/UL (ref 0.05–0.8)
MONOCYTES NFR BLD AUTO: 9.9 %
NEUTROPHILS # BLD AUTO: 3.88 X10*3/UL (ref 1.6–5.5)
NEUTROPHILS NFR BLD AUTO: 75.6 %
NRBC BLD-RTO: 0 /100 WBCS (ref 0–0)
PLATELET # BLD AUTO: 192 X10*3/UL (ref 150–450)
POTASSIUM SERPL-SCNC: 4.1 MMOL/L (ref 3.5–5.3)
PROT SERPL-MCNC: 6.3 G/DL (ref 6.4–8.2)
PROTHROMBIN TIME: 10.7 SECONDS (ref 9.8–12.8)
RBC # BLD AUTO: 4.78 X10*6/UL (ref 4.5–5.9)
SODIUM SERPL-SCNC: 140 MMOL/L (ref 136–145)
WBC # BLD AUTO: 5.1 X10*3/UL (ref 4.4–11.3)

## 2024-05-14 PROCEDURE — 36415 COLL VENOUS BLD VENIPUNCTURE: CPT

## 2024-05-14 PROCEDURE — 3430000001 HC RX 343 DIAGNOSTIC RADIOPHARMACEUTICALS: Performed by: INTERNAL MEDICINE

## 2024-05-14 PROCEDURE — 82784 ASSAY IGA/IGD/IGG/IGM EACH: CPT | Performed by: NURSE PRACTITIONER

## 2024-05-14 PROCEDURE — 1160F RVW MEDS BY RX/DR IN RCRD: CPT | Performed by: INTERNAL MEDICINE

## 2024-05-14 PROCEDURE — 82947 ASSAY GLUCOSE BLOOD QUANT: CPT | Mod: 59

## 2024-05-14 PROCEDURE — 78815 PET IMAGE W/CT SKULL-THIGH: CPT | Mod: PET TUMOR SUBSQ TX STRATEGY | Performed by: NUCLEAR MEDICINE

## 2024-05-14 PROCEDURE — 84075 ASSAY ALKALINE PHOSPHATASE: CPT | Performed by: INTERNAL MEDICINE

## 2024-05-14 PROCEDURE — 85025 COMPLETE CBC W/AUTO DIFF WBC: CPT | Performed by: INTERNAL MEDICINE

## 2024-05-14 PROCEDURE — 1159F MED LIST DOCD IN RCRD: CPT | Performed by: INTERNAL MEDICINE

## 2024-05-14 PROCEDURE — 99215 OFFICE O/P EST HI 40 MIN: CPT | Performed by: INTERNAL MEDICINE

## 2024-05-14 PROCEDURE — 78815 PET IMAGE W/CT SKULL-THIGH: CPT | Mod: PS

## 2024-05-14 PROCEDURE — A9552 F18 FDG: HCPCS | Performed by: INTERNAL MEDICINE

## 2024-05-14 PROCEDURE — 85610 PROTHROMBIN TIME: CPT | Performed by: INTERNAL MEDICINE

## 2024-05-14 PROCEDURE — 83735 ASSAY OF MAGNESIUM: CPT | Performed by: INTERNAL MEDICINE

## 2024-05-14 RX ORDER — FLUDEOXYGLUCOSE F 18 200 MCI/ML
10.4 INJECTION, SOLUTION INTRAVENOUS
Status: COMPLETED | OUTPATIENT
Start: 2024-05-14 | End: 2024-05-14

## 2024-05-14 RX ADMIN — FLUDEOXYGLUCOSE F 18 10.4 MILLICURIE: 200 INJECTION, SOLUTION INTRAVENOUS at 11:30

## 2024-05-14 NOTE — PROGRESS NOTES
Pt ambulated to SCT unit without assistance with his wife. Pt denies complaints or pain today. Vitals obtained, blood drawn peripherally via right wrist 22 G PIV and sent to lab. PIV inserted without incident. Pt has an appointment with Dr. Daley scheduled for today. Pt ambulated off unit without complaints or assistance. PIV will remain in place for patient's PET/Scan. Blood return noted and line flushed without difficulty or pain prior to patient leaving [facility] 2.

## 2024-05-14 NOTE — PROGRESS NOTES
HEMATOLOGY/ONCOLOGY CLINIC NOTE    HPI:  Doing same. Fatigue same. Chronic cough persisting, maybe worsened over last 6 months. A/w congestion over last 6 months. Received azithro course in Nov 2023 with minimal improvement. Cough worse in night, disturbs sleep. No phlegm.   No fevers, night sweats, no weight loss, appetite fair. Occasional nausea from the vertigo.    Rest ROS negative    Oncology History and Treatment synopsis  Oncology History Overview Note   Treatment Synopsis:    - Relapsed Mantle Cell Lymphoma, originally diagnosed  Aug 2021 with lymphadenopathy and esophageal mass, biopsy of which consistent with mantle cell lymphoma, Ki67 of 10%  - s/p Zak/Rituxan x 4 cycles, followed by maintenance Rituxan (completed Aug 2022)  - PET scan (2/6/23): multiple FDG avid nodes in mediastinum and hilar area, no LN below diaphragm  - Repeat PET scan (4/24/23): persistent thoracic LN, new intraluminal masses in transverse colon concerning for disease  - BMBx (4/6/23): negative for lymphoma  - Admitted for CART (T0= 5/16/23) prep with flu/cy on CASE 2422  - 5/16/23: CART, infused with 17.5 x10^6/Kg cells        PET CT day +30, D+6, D+90 STEFFI       Mantle cell lymphoma (Multi)   9/19/2023 Initial Diagnosis    Mantle cell lymphoma (CMS/HCC)         PMH:  Past Medical History:   Diagnosis Date    Acute pharyngitis, unspecified 01/31/2018    Acute pharyngitis    Acute pulmonary embolism with acute cor pulmonale, unspecified pulmonary embolism type (Multi)     Acute sinusitis, unspecified 01/31/2018    Acute sinusitis    Arthritis     Calculus of kidney 01/31/2018    Kidney stone on right side    Chest pain, unspecified 01/31/2018    Chest pain    Cystoid macular degeneration, right eye 01/31/2018    Cystoid macular edema of right eye    Dizziness and giddiness 01/31/2018    Dizziness    Gastro-esophageal reflux disease without esophagitis 01/31/2018    Esophageal reflux    Mantle cell lymphoma (Multi)     Migraine,  unspecified, not intractable, without status migrainosus 01/31/2018    Migraine headache    Other fatigue 01/31/2018    Fatigue    Personal history of malignant neoplasm, unspecified     History of malignant neoplasm    Personal history of other diseases of the circulatory system 04/15/2013    History of hypertension    Pure hypercholesterolemia, unspecified 09/24/2013    Low-density-lipoid-type (LDL) hyperlipoproteinemia    Pure hypercholesterolemia, unspecified 01/31/2018    Hypercholesterolemia    Rheumatoid arthritis, unspecified (Multi) 01/31/2018    Rheumatoid arthritis    Thrombocytopenia, unspecified (CMS-HCC) 01/31/2018    Thrombocytopenia    Unspecified osteoarthritis, unspecified site 01/31/2018    Osteoarthritis    Vitamin deficiency, unspecified 01/31/2018    Vitamin deficiency       PSH:  Past Surgical History:   Procedure Laterality Date    CT ABDOMEN PELVIS ANGIOGRAM W AND/OR WO IV CONTRAST  6/9/2023    CT ABDOMEN PELVIS ANGIOGRAM W AND/OR WO IV CONTRAST 6/9/2023 [facility] [place] CT    HAND SURGERY  09/26/2016    Hand Surgery                                                                                                                                                          IR CVC TUNNELED  5/1/2023    IR CVC TUNNELED 5/1/2023 [facility] [place] ANGIO    KIDNEY SURGERY  09/26/2016    Kidney Surgery    MR HEAD ANGIO WO IV CONTRAST  9/4/2013    MR HEAD ANGIO WO IV CONTRAST [facility]    OTHER SURGICAL HISTORY  12/07/2022    Lymph node surgery    OTHER SURGICAL HISTORY  09/26/2016    Scler Buckle Repair Sufficient Fluid Drained, Retina Inspect    OTHER SURGICAL HISTORY  09/26/2016    Root Canal       SH:  Social History     Tobacco Use    Smoking status: Never    Smokeless tobacco: Not on file   Substance Use Topics    Alcohol use: Never      has no history on file for drug use.      CURRENT MEDS:  Current Outpatient Medications on File Prior to Visit   Medication Sig Dispense Refill    acyclovir (Zovirax) 400 mg tablet  "TAKE 1 TABLET BY MOUTH EVERY 12 HOURS 180 tablet 3    atorvastatin (Lipitor) 40 mg tablet Take 0.5 tablets (20 mg) by mouth once daily. 45 tablet 1    multivitamin tablet Take 1 tablet by mouth once daily.      omeprazole (PriLOSEC) 40 mg DR capsule TAKE 1 CAPSULE ONE TIME DAILY 90 capsule 0    sulfamethoxazole-trimethoprim (Bactrim DS) 800-160 mg tablet Take 1 tablet by mouth 3 times a week. 36 tablet 1     No current facility-administered medications on file prior to visit.       PHYSICAL EXAM:  There were no vitals taken for this visit. Reviewed in chart    ECOG score 0    Physical Exam  Vitals reviewed.   Constitutional:       Appearance: Normal appearance.   HENT:      Head: Normocephalic.      Mouth/Throat:      Mouth: Mucous membranes are moist.      Pharynx: Oropharynx is clear.   Eyes:      Pupils: Pupils are equal, round, and reactive to light.      Comments: Eyepatch left   Cardiovascular:      Rate and Rhythm: Normal rate and regular rhythm.   Pulmonary:      Effort: Pulmonary effort is normal. No respiratory distress.      Breath sounds: Normal breath sounds. No wheezing or rales.   Abdominal:      General: Abdomen is flat. Bowel sounds are normal. There is no distension.      Palpations: Abdomen is soft. There is no mass.   Musculoskeletal:         General: No swelling. Normal range of motion.   Skin:     General: Skin is warm.      Findings: No erythema or rash.   Neurological:      General: No focal deficit present.      Mental Status: He is alert and oriented to person, place, and time.   Psychiatric:         Mood and Affect: Mood normal.         Behavior: Behavior normal.         Thought Content: Thought content normal.   LAB DATA:    No components found for: \"CBC\"  Lab Results   Component Value Date    WBC 5.1 05/14/2024    WBC 5.4 05/13/2024    WBC 3.4 (L) 01/26/2024    WBC 2.8 (L) 12/07/2023    WBC 4.1 (L) 11/21/2023     Lab Results   Component Value Date    HGB 14.8 05/14/2024    HGB 14.6 " 2024    HGB 14.0 2024    HGB 13.7 2023    HGB 13.0 (L) 2023     Lab Results   Component Value Date     2024     2024     2024     2023     2023       Lab Results   Component Value Date    GLUCOSE 95 2024    CALCIUM 9.0 2024     2024    K 4.1 2024    CO2 26 2024     2024    BUN 12 2024    CREATININE 0.81 2024     Lab Results   Component Value Date    CREATININE 0.81 2024    CREATININE 0.79 2024    CREATININE 0.90 2024    CREATININE 0.75 2023    CREATININE 0.82 2023       Lab Results   Component Value Date    ALT 19 2024    AST 15 2024    ALKPHOS 105 2024    BILITOT 0.6 2024       [facility]  Lab Results   Component Value Date     2023     2023     (L) 10/24/2023     (L) 2023     (L) 2023       ASSESSMENT AND PLAN  Mr. Avalos is a 73 y/o M w/ PMH of severe vertigo, HLD, GERD/ Ceja's esophagus, prostate cancer (2016) s/p brachytherapy, MCL ( dx: 2021) s/p BR x 4 followed  by maintenance ritux, unfortunately found to have evidence of relapsed disease on PET scan (23) with biopsy confirmation (23) who was referred for treatment of recurrent MCL. S/p CAR-T, coming today for 1 year post CAR T visit.     Concerns today - chronic cough, dry, congestion, no constitutional symptoms.   Will get chest CT with contrast for further evaluation.      Relapsed MCL  - intially diagnosed 2021; MIPI: N/A; ECO; KPS of 70  - TP53: negative, ki-67: 10%  - s/p BR x 4 cycles, followed by maintenance ritux x 4 ( last dose 2022)  - PET scan (23): increased uptake in hilar/ mediastinal LN concerning for relapsed disease  - Bronchoscopy (23): biopsy consistent with MCL ( no Ki-67 or TP53 reported)  - BM bx (2023) STEFFI  CAR T Cell therapy: Currently  day  + 268 (T0=5/16/23)     Treated with clinical trial CASE 2422; preparative regimen included fludarabine 30mg/m2 & cyclophosphamide 500mg/m2 on days T-4, T-3, & T-2.  Hospital course complicated by grade 3 neurotox and grade 2 CRS with fever and confusion; infectious workup negative, CTH negative for acute process, managed w/ toci x2 doses and dexamethasone taper that was completed on 5/30. Steroid induced hallucinations  resolved at discharge.      Response Monitoring:   Day 30 response monitoring: PET scan (6/13/23) notes CR (Deauville 1)   Day 60 response monitoring: PET scan (7/11/23) notes CR (Deauville 1)   Day 90 response monitoring: PET scan (8/17/23) notes CR (Deauville 1)  Day +365 response monitoring: PET scan (5/14/24) -  some concern over hilar lymph node   DD infectious vs lymphoma - will repeat PET in 2 months.          Infectious prophylaxis:    Antiviral prophylaxis: acyclovir,  stop today 5/14/24   PCP prophylaxis: TMP-SMX DS, M/W/F;  stop today 5/14/24   Antifungal prophylaxis: fluconazole,  completed     CMV monitoring:  - CMV DNA PCR (with each visit): ND 12/7/23     IgG (with each visit): 700 (12/7/23), pending from today    Persistent cough will order CT chest r/o infection     Anti-infective Vaccines:   - has received vaccines for 6 months and 9 months post CAR-T  - patient wants immunization record, but left without it today. Will give next visit.      Cardiac:   ECHO 4/6/23: EF 55-60%, impaired pattern of relaxation, atrial septal aneurism present   ECHO 6/15/23: EF 50-55%, large PFO seen with bubble study, atrial septal aneurism present; no clots seen     Hx HLD: continue atorvastatin  ASA 81 mg restarted at discharge         PE  Patient presented to the ED on 6/8/23 with R sided low back pain that started acutely that evening; pain worse with inspiration. CT revealed bilateral PEs. US BLE negative for DVT.  Started on heparin drip in the ED and patient was admitted for further workup  and management of PE.    Initially patient was transitioned to LMWH, however, because of prescription cost he was discharged on Eliquis and will continue for 6mths.  until end of January 2024, since it's deemed provoked in nature  - Completed Eliquis Jan 2024  - instructed to avoid excessive immobility or long travel with no breaks.     Neuro/psych  Hx of vertigo, stable. Able to navigate at home; no falls.   Grade 3 neurotoxicity with onset 5/25/23  Steroid induced hallucinations, rapid dexamethasone taper (10 mg Q6 on 5/25, then Q8 on  5/27& q12 5/28, q24 5/29, then completed during hospitalization on 5/30/23) sx resolved.  6/20 all neuro symptoms long resolved.      Mood  6/2023 Lexapro.      Ophthalmic  Hx of L eye corneal abrasion, occular hypertension  s/p surgical intervention w/ scleral buckling (1993), photocoagulation (1993)  Legally blind in L eye, now managed w/ eye patch         RTC in 3 months,   Will follow up CT chest on 5/30/24     Patient seen and discussed with Dr Ember MD  Fellow  [facility]  [facility]

## 2024-05-14 NOTE — PROGRESS NOTES
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 We reviewed Aes and conmedications. He states heis energy level has not returned to normal and he notes his vertigo gets triggered more quickly when doing activities such as grocery shopping. He explained he's had insomnia since he was a teenager and has not sleep very man hours over night but it seems to be less the last 6 months. He wife reports he tried taking melatonin once and he said he was up shortly after. He also notes continued voice alteration affecting his singing. He continues to have a persistent cough (with sinus drainage) which also makes it difficult to sing. He denies any headaches, fevers, chills, night sweats, swelling, or falls. We reviewed the LTFU safety questionnaire. I reviewed LTFU schedule including persistence assays and the LTFU questionnaire for safety. I also gave him the contact information for [facility]

## 2024-05-15 LAB
CMV DNA SERPL NAA+PROBE-LOG IU: NORMAL {LOG_IU}/ML
LABORATORY COMMENT REPORT: NOT DETECTED

## 2024-05-16 ENCOUNTER — APPOINTMENT (OUTPATIENT)
Dept: HEMATOLOGY/ONCOLOGY | Facility: HOSPITAL | Age: 74
End: 2024-05-16
Payer: MEDICARE

## 2024-05-20 ENCOUNTER — OFFICE VISIT (OUTPATIENT)
Dept: PRIMARY CARE | Facility: CLINIC | Age: 74
End: 2024-05-20
Payer: MEDICARE

## 2024-05-20 VITALS
WEIGHT: 205 LBS | BODY MASS INDEX: 29.35 KG/M2 | SYSTOLIC BLOOD PRESSURE: 116 MMHG | HEIGHT: 70 IN | DIASTOLIC BLOOD PRESSURE: 58 MMHG

## 2024-05-20 DIAGNOSIS — Z12.5 PROSTATE CANCER SCREENING: ICD-10-CM

## 2024-05-20 DIAGNOSIS — K21.9 GASTROESOPHAGEAL REFLUX DISEASE, UNSPECIFIED WHETHER ESOPHAGITIS PRESENT: Primary | ICD-10-CM

## 2024-05-20 DIAGNOSIS — C83.10 MANTLE CELL LYMPHOMA, UNSPECIFIED BODY REGION (MULTI): ICD-10-CM

## 2024-05-20 DIAGNOSIS — I10 HYPERTENSION, UNSPECIFIED TYPE: ICD-10-CM

## 2024-05-20 DIAGNOSIS — H81.319 AUDITORY VERTIGO, UNSPECIFIED LATERALITY: ICD-10-CM

## 2024-05-20 DIAGNOSIS — E78.00 PURE HYPERCHOLESTEROLEMIA: ICD-10-CM

## 2024-05-20 PROCEDURE — 3078F DIAST BP <80 MM HG: CPT | Performed by: INTERNAL MEDICINE

## 2024-05-20 PROCEDURE — 1160F RVW MEDS BY RX/DR IN RCRD: CPT | Performed by: INTERNAL MEDICINE

## 2024-05-20 PROCEDURE — 3074F SYST BP LT 130 MM HG: CPT | Performed by: INTERNAL MEDICINE

## 2024-05-20 PROCEDURE — 99213 OFFICE O/P EST LOW 20 MIN: CPT | Performed by: INTERNAL MEDICINE

## 2024-05-20 PROCEDURE — 1159F MED LIST DOCD IN RCRD: CPT | Performed by: INTERNAL MEDICINE

## 2024-05-20 NOTE — PROGRESS NOTES
"Subjective   Patient ID: Rip Avalos is a 73 y.o. male who presents for Follow-up.    It is always a delight to serve Mr. Avalos.  Today he came here for follow-up appointment.  He is seeing cancer doctors and things are okay.  Appetite and weight are okay.  No problem.    I have personally reviewed the patient's Past Medical History, Medications, Allergies, Social History, and Family History in the EMR.    Review of Systems   All other systems reviewed and are negative.    Objective   /58   Ht 1.778 m (5' 10\")   Wt 93 kg (205 lb)   BMI 29.41 kg/m²     Physical Exam  Vitals reviewed.   Cardiovascular:      Heart sounds: Normal heart sounds, S1 normal and S2 normal. No murmur heard.     No friction rub.   Pulmonary:      Effort: Pulmonary effort is normal.      Breath sounds: Normal breath sounds and air entry.   Abdominal:      Palpations: There is no hepatomegaly, splenomegaly or mass.   Musculoskeletal:      Right lower leg: No edema.      Left lower leg: No edema.   Lymphadenopathy:      Lower Body: No right inguinal adenopathy. No left inguinal adenopathy.   Neurological:      Cranial Nerves: Cranial nerves 2-12 are intact.      Sensory: No sensory deficit.      Motor: Motor function is intact.      Deep Tendon Reflexes: Reflexes are normal and symmetric.     LAB WORK:  Laboratory testing discussed.    Assessment/Plan   Problem List Items Addressed This Visit             ICD-10-CM       Cardiac and Vasculature    Hypertension I10    Relevant Orders    CBC    Ammonia    Urinalysis with Reflex Microscopic       ENT    Vertigo, aural H81.319    Relevant Orders    Disability Placard       Gastrointestinal and Abdominal    Esophageal reflux - Primary K21.9       Hematology and Neoplasia    Mantle cell lymphoma (Multi) C83.10     Other Visit Diagnoses         Codes    Pure hypercholesterolemia     E78.00    Relevant Orders    Comprehensive Metabolic Panel    Lipid Panel    Prostate cancer screening   "   Z12.5        1. GERD, on PPI.  B12 okay.  Magnesium okay.  2. High cholesterol, on medication.  3. Prostate health, okay.  He sees Dr. Campos.  He is retiring.  I told him to change to Dr. Ugalde, who took care of his father.  4. Cancer, he is under treatment.  PET scan done.  5. Follow up in couple of months.  6. I urged him to do Medicare Wellness at that time, he is willing to oblige me.  7. The patient is permanent disabled, he cannot walk and the problem with the balance.  He will benefit from disabled person parking permit, given.    Scribe Attestation  By signing my name below, I, Jaz Burks attest that this documentation has been prepared under the direction and in the presence of Sun Yuen MD.

## 2024-05-30 ENCOUNTER — INFUSION (OUTPATIENT)
Dept: HEMATOLOGY/ONCOLOGY | Facility: HOSPITAL | Age: 74
End: 2024-05-30
Payer: MEDICARE

## 2024-05-30 ENCOUNTER — OFFICE VISIT (OUTPATIENT)
Dept: HEMATOLOGY/ONCOLOGY | Facility: HOSPITAL | Age: 74
End: 2024-05-30
Payer: MEDICARE

## 2024-05-30 ENCOUNTER — HOSPITAL ENCOUNTER (OUTPATIENT)
Dept: RADIOLOGY | Facility: HOSPITAL | Age: 74
Discharge: HOME | End: 2024-05-30
Payer: MEDICARE

## 2024-05-30 VITALS
BODY MASS INDEX: 31.17 KG/M2 | TEMPERATURE: 98.6 F | WEIGHT: 205.69 LBS | HEART RATE: 81 BPM | HEIGHT: 68 IN | OXYGEN SATURATION: 99 % | SYSTOLIC BLOOD PRESSURE: 122 MMHG | DIASTOLIC BLOOD PRESSURE: 77 MMHG | RESPIRATION RATE: 18 BRPM

## 2024-05-30 DIAGNOSIS — R05.3 CHRONIC COUGH: Primary | ICD-10-CM

## 2024-05-30 DIAGNOSIS — R06.09 DYSPNEA ON EXERTION: ICD-10-CM

## 2024-05-30 DIAGNOSIS — C83.10 MANTLE CELL LYMPHOMA, UNSPECIFIED BODY REGION (MULTI): ICD-10-CM

## 2024-05-30 DIAGNOSIS — C83.10 MANTLE CELL LYMPHOMA, UNSPECIFIED BODY REGION (MULTI): Primary | ICD-10-CM

## 2024-05-30 DIAGNOSIS — K22.719 BARRETT'S ESOPHAGUS WITH DYSPLASIA: Primary | ICD-10-CM

## 2024-05-30 DIAGNOSIS — D84.9 IMMUNOCOMPROMISED STATE (MULTI): ICD-10-CM

## 2024-05-30 DIAGNOSIS — Z92.850 HISTORY OF CHIMERIC ANTIGEN RECEPTOR T-CELL THERAPY: ICD-10-CM

## 2024-05-30 DIAGNOSIS — Z92.21 PERSONAL HISTORY OF ANTINEOPLASTIC CHEMOTHERAPY: ICD-10-CM

## 2024-05-30 DIAGNOSIS — D80.1 HYPOGAMMAGLOBULINEMIA (MULTI): ICD-10-CM

## 2024-05-30 PROCEDURE — 2500000004 HC RX 250 GENERAL PHARMACY W/ HCPCS (ALT 636 FOR OP/ED): Performed by: NURSE PRACTITIONER

## 2024-05-30 PROCEDURE — 99215 OFFICE O/P EST HI 40 MIN: CPT | Performed by: NURSE PRACTITIONER

## 2024-05-30 PROCEDURE — 96374 THER/PROPH/DIAG INJ IV PUSH: CPT | Mod: INF

## 2024-05-30 PROCEDURE — 96375 TX/PRO/DX INJ NEW DRUG ADDON: CPT | Mod: INF

## 2024-05-30 PROCEDURE — 2550000001 HC RX 255 CONTRASTS: Performed by: STUDENT IN AN ORGANIZED HEALTH CARE EDUCATION/TRAINING PROGRAM

## 2024-05-30 PROCEDURE — 2500000001 HC RX 250 WO HCPCS SELF ADMINISTERED DRUGS (ALT 637 FOR MEDICARE OP): Performed by: NURSE PRACTITIONER

## 2024-05-30 PROCEDURE — 2500000004 HC RX 250 GENERAL PHARMACY W/ HCPCS (ALT 636 FOR OP/ED): Mod: JZ | Performed by: NURSE PRACTITIONER

## 2024-05-30 PROCEDURE — 71260 CT THORAX DX C+: CPT

## 2024-05-30 RX ORDER — ALBUTEROL SULFATE 0.83 MG/ML
3 SOLUTION RESPIRATORY (INHALATION) AS NEEDED
Status: DISCONTINUED | OUTPATIENT
Start: 2024-05-30 | End: 2024-05-30 | Stop reason: HOSPADM

## 2024-05-30 RX ORDER — BENZONATATE 100 MG/1
100 CAPSULE ORAL 3 TIMES DAILY PRN
Qty: 20 CAPSULE | Refills: 0 | Status: SHIPPED | OUTPATIENT
Start: 2024-05-30 | End: 2024-06-06

## 2024-05-30 RX ORDER — DIPHENHYDRAMINE HYDROCHLORIDE 50 MG/ML
50 INJECTION INTRAMUSCULAR; INTRAVENOUS AS NEEDED
Status: DISCONTINUED | OUTPATIENT
Start: 2024-05-30 | End: 2024-05-30 | Stop reason: HOSPADM

## 2024-05-30 RX ORDER — ACETAMINOPHEN 325 MG/1
650 TABLET ORAL ONCE
Status: COMPLETED | OUTPATIENT
Start: 2024-05-30 | End: 2024-05-30

## 2024-05-30 RX ORDER — EPINEPHRINE 0.3 MG/.3ML
0.3 INJECTION SUBCUTANEOUS EVERY 5 MIN PRN
Status: DISCONTINUED | OUTPATIENT
Start: 2024-05-30 | End: 2024-05-30 | Stop reason: HOSPADM

## 2024-05-30 RX ORDER — DIPHENHYDRAMINE HCL 25 MG
25 CAPSULE ORAL ONCE
Status: COMPLETED | OUTPATIENT
Start: 2024-05-30 | End: 2024-05-30

## 2024-05-30 RX ORDER — DIPHENHYDRAMINE HYDROCHLORIDE 50 MG/ML
50 INJECTION INTRAMUSCULAR; INTRAVENOUS AS NEEDED
OUTPATIENT
Start: 2024-06-04

## 2024-05-30 RX ORDER — ACETAMINOPHEN 325 MG/1
650 TABLET ORAL ONCE
OUTPATIENT
Start: 2024-06-04

## 2024-05-30 RX ORDER — EPINEPHRINE 0.3 MG/.3ML
0.3 INJECTION SUBCUTANEOUS EVERY 5 MIN PRN
OUTPATIENT
Start: 2024-06-04

## 2024-05-30 RX ORDER — HEPARIN 100 UNIT/ML
500 SYRINGE INTRAVENOUS AS NEEDED
OUTPATIENT
Start: 2024-05-30

## 2024-05-30 RX ORDER — DIPHENHYDRAMINE HCL 25 MG
25 CAPSULE ORAL ONCE
OUTPATIENT
Start: 2024-06-04

## 2024-05-30 RX ORDER — FAMOTIDINE 10 MG/ML
20 INJECTION INTRAVENOUS ONCE AS NEEDED
Status: DISCONTINUED | OUTPATIENT
Start: 2024-05-30 | End: 2024-05-30 | Stop reason: HOSPADM

## 2024-05-30 RX ORDER — HEPARIN SODIUM,PORCINE/PF 10 UNIT/ML
50 SYRINGE (ML) INTRAVENOUS AS NEEDED
OUTPATIENT
Start: 2024-05-30

## 2024-05-30 RX ORDER — FAMOTIDINE 10 MG/ML
20 INJECTION INTRAVENOUS ONCE AS NEEDED
OUTPATIENT
Start: 2024-06-04

## 2024-05-30 RX ORDER — ALBUTEROL SULFATE 0.83 MG/ML
3 SOLUTION RESPIRATORY (INHALATION) AS NEEDED
OUTPATIENT
Start: 2024-06-04

## 2024-05-30 RX ORDER — HEPARIN SODIUM,PORCINE/PF 10 UNIT/ML
50 SYRINGE (ML) INTRAVENOUS AS NEEDED
Status: DISCONTINUED | OUTPATIENT
Start: 2024-05-30 | End: 2024-05-30 | Stop reason: HOSPADM

## 2024-05-30 RX ORDER — HEPARIN 100 UNIT/ML
500 SYRINGE INTRAVENOUS AS NEEDED
Status: DISCONTINUED | OUTPATIENT
Start: 2024-05-30 | End: 2024-05-30 | Stop reason: HOSPADM

## 2024-05-30 RX ADMIN — IMMUNE GLOBULIN INFUSION (HUMAN) 35 G: 100 INJECTION, SOLUTION INTRAVENOUS; SUBCUTANEOUS at 14:42

## 2024-05-30 RX ADMIN — IOHEXOL 75 ML: 350 INJECTION, SOLUTION INTRAVENOUS at 13:43

## 2024-05-30 RX ADMIN — DIPHENHYDRAMINE HYDROCHLORIDE 25 MG: 25 CAPSULE ORAL at 14:21

## 2024-05-30 RX ADMIN — METHYLPREDNISOLONE SODIUM SUCCINATE 20 MG: 40 INJECTION, POWDER, FOR SOLUTION INTRAMUSCULAR; INTRAVENOUS at 14:21

## 2024-05-30 RX ADMIN — ACETAMINOPHEN 650 MG: 325 TABLET ORAL at 14:21

## 2024-05-31 ENCOUNTER — APPOINTMENT (OUTPATIENT)
Dept: HEMATOLOGY/ONCOLOGY | Facility: HOSPITAL | Age: 74
End: 2024-05-31
Payer: MEDICARE

## 2024-05-31 NOTE — PROGRESS NOTES
Patient ID:  Rip Avalos is a 73 y.o. male.  Referring Physician:   ONC Dr Sue  Primary Care Provider:  Sun Yuen MD    Assessment/Plan      24 Reviewed PET/CT and CT chest scans. Per most recent Dr Sue note, plan to repeat PET/CT in 2 months (requested for July with follow up). Also requested appt with GI for follow up Ceja's esophagus. Cough worse at night. Switch PPI to bedtime. Try Tessalon. IVIG for IgG <400. Up-to-date on vaccines.     Oncology History Overview Note   Treatment Synopsis:    - Relapsed Mantle Cell Lymphoma, originally diagnosed  Aug 2021 with lymphadenopathy and esophageal mass, biopsy of which consistent with mantle cell lymphoma, Ki67 of 10%  - s/p Zak/Rituxan x 4 cycles, followed by maintenance Rituxan (completed Aug 2022)  - PET scan (23): multiple FDG avid nodes in mediastinum and hilar area, no LN below diaphragm  - Repeat PET scan (23): persistent thoracic LN, new intraluminal masses in transverse colon concerning for disease  - BMBx (23): negative for lymphoma  - Admitted for CART (T0= 23) prep with flu/cy on CASE 2422  - 23: CART, infused with 17.5 x10^6/Kg cells        PET CT day +30, D+6, D+90 STEFFI       Mantle cell lymphoma (Multi)   2023 Initial Diagnosis    Mantle cell lymphoma (CMS/HCC)        Mr. Avalos is a 74 y/o M w/ PMH of severe vertigo, HLD, GERD/Ceja's esophagus, prostate cancer (2016) s/p brachytherapy, MCL ( dx: 2021) s/p BR x 4 followed  by maintenance ritux. Unfortunately found to have evidence of relapsed disease on PET scan (23) with biopsy confirmation (23). Referred for treatment of recurrent MCL. S/p CAR-T x1 year.    Relapsed MCL  - Intially diagnosed 2021; MIPI: N/A; ECO; KPS of 70  - TP53: negative, ki-67: 10%  - s/p BR x 4 cycles, followed by maintenance ritux x 4 ( last dose 2022)  - PET scan (23): increased uptake in hilar/ mediastinal LN concerning for relapsed  disease  - Bronchoscopy (2/20/23): biopsy consistent with MCL ( no Ki-67 or TP53 reported)  - BM bx (4/8/2023) STEFFI  - CAR T Cell therapy: Currently >1 year (T0=5/16/23)     Treated with clinical trial CASE 2422; preparative regimen included fludarabine 30mg/m2 & cyclophosphamide 500mg/m2 on days T-4, T-3, & T-2.  Hospital course complicated by grade 3 neurotox and grade 2 CRS with fever and confusion; infectious workup negative, CTH negative for acute process, managed w/ toci x2 doses and dexamethasone taper that was completed on 5/30. Steroid induced hallucinations  resolved at discharge.      Response Monitoring:   Day 30 response monitoring: PET scan (6/13/23) notes CR (Deauville 1)   Day 60 response monitoring: PET scan (7/11/23) notes CR (Deauville 1)   Day 90 response monitoring: PET scan (8/17/23) notes CR (Deauville 1)   Day +365 response monitoring: PET scan (5/14/24) Deauville 5-some concern over hilar lymph node     Chronic cough.   Chronic cough persisting, maybe worsened over last 6 months. A/w congestion over last 6 months. Received azithro course in Nov 2023 with minimal improvement. Cough worse in night, disturbs sleep. No phlegm.     5/14/24 CT Chest  1.  Bibasilar atelectasis without evidence of acute pathology.  2. Esophagus is mildly patulous in its mid to distal portion with small hiatal hernia. Correlate with concern for gastroesophageal reflux disease.  3. Additional chronic findings as noted.    Infectious prophylaxis:    Antiviral prophylaxis: acyclovir,  completed 5/14/24 (s/p Shingirx x2)   PCP prophylaxis: TMP-SMX DS, M/W/F;  completed 5/14/24   Antifungal prophylaxis: fluconazole,  completed     CMV DNA PCR: ND 5/14/24.     5/14/24 IgG 390. 5/30/24 IVIG.     Anti-infective Vaccines:   9/20/23 Flu  10/17/23 RSV  10/20/23 Covid    Protocol  #1 12/7/23  #2 2/8/23  #3 4/4/23    Cardiac:   ECHO 4/6/23: EF 55-60%, impaired pattern of relaxation, atrial septal aneurism present   ECHO 6/15/23:  "EF 50-55%, large PFO seen with bubble study, atrial septal aneurism present; no clots seen     Hx HLD: continue atorvastatin  ASA 81 mg restarted at discharge      PE  Patient presented to the ED on 6/8/23 with R sided low back pain that started acutely that evening; pain worse with inspiration. CT revealed bilateral PEs. US BLE negative for DVT.  Started on heparin drip in the ED and patient was admitted for further workup and management of PE.   Initially patient was transitioned to LMWH, however, because of prescription cost he was discharged on Eliquis x 6mths until end of January 2024 (deemed provoked in nature)  Instructed to avoid excessive immobility or long travel with no breaks.     Neuro/psych  Hx of vertigo, stable. Able to navigate at home; no falls.   Grade 3 neurotoxicity with onset 5/25/23  Steroid induced hallucinations, rapid dexamethasone taper (10 mg Q6 on 5/25, then Q8 on  5/27& q12 5/28, q24 5/29, then completed during hospitalization on 5/30/23) sx resolved.  6/20/23 all neuro symptoms long resolved.      Mood  6/2023 Lexapro.      Ophthalmic  Hx of L eye corneal abrasion, occular hypertension  s/p surgical intervention w/ scleral buckling (1993), photocoagulation (1993)  Legally blind in L eye, now managed w/eye patch    Subjective    History of Present Illness:  Mr Avalos presents to the clinic today for vaccines. Up-to-date on vaccines, but due for IVIG.    \"Wondering about PET/CT and CT chest scan results?\"    He reports ongoing chronic cough/occas productive, mostly at night. Persistent, not getting better. Trouble sleeping/lying down at night due to cough. Stable SOB.     Appetite good. Drinking well. Weight stable 203-205#.    Energy level \"5/10\"- not back to baseline (10/10).    No fevers, night sweats, weight loss, LA. Occasional nausea from the vertigo.           Review of Systems   Constitutional:  Positive for fatigue.   HENT:  Negative.     Eyes: Negative.    Respiratory:  " Positive for cough and shortness of breath.    Cardiovascular: Negative.    Gastrointestinal: Negative.    Endocrine: Negative.    Genitourinary: Negative.     Musculoskeletal: Negative.    Skin: Negative.    Neurological: Negative.    Hematological: Negative.    Psychiatric/Behavioral: Negative.              Objective        Physical Exam  Vitals reviewed.   Constitutional:       Appearance: Normal appearance.   HENT:      Head: Normocephalic and atraumatic.      Nose: Nose normal.      Mouth/Throat:      Mouth: Mucous membranes are moist.   Eyes:      Pupils: Pupils are equal, round, and reactive to light.      Comments: L eye patched   Cardiovascular:      Rate and Rhythm: Normal rate and regular rhythm.      Pulses: Normal pulses.      Heart sounds: Normal heart sounds.   Pulmonary:      Effort: Pulmonary effort is normal.      Breath sounds: Normal breath sounds.   Abdominal:      General: Abdomen is flat. Bowel sounds are normal.      Palpations: Abdomen is soft.   Musculoskeletal:         General: Normal range of motion.   Skin:     General: Skin is warm and dry.   Neurological:      General: No focal deficit present.      Mental Status: He is alert and oriented to person, place, and time.   Psychiatric:         Mood and Affect: Mood normal.       RTC   7/15 PET [facility] 7/16 Dr Sue

## 2024-06-05 ASSESSMENT — ENCOUNTER SYMPTOMS
MUSCULOSKELETAL NEGATIVE: 1
GASTROINTESTINAL NEGATIVE: 1
SHORTNESS OF BREATH: 1
NEUROLOGICAL NEGATIVE: 1
CARDIOVASCULAR NEGATIVE: 1
EYES NEGATIVE: 1
FATIGUE: 1
HEMATOLOGIC/LYMPHATIC NEGATIVE: 1
ENDOCRINE NEGATIVE: 1
COUGH: 1
PSYCHIATRIC NEGATIVE: 1

## 2024-06-05 NOTE — ASSESSMENT & PLAN NOTE
Chronic cough.     5/14/24 CT Chest  1.  Bibasilar atelectasis without evidence of acute pathology.  2. Esophagus is mildly patulous in its mid to distal portion with small hiatal hernia. Correlate with concern for gastroesophageal reflux disease.  3. Additional chronic findings as noted.

## 2024-06-10 ENCOUNTER — TELEPHONE (OUTPATIENT)
Dept: HEMATOLOGY/ONCOLOGY | Facility: HOSPITAL | Age: 74
End: 2024-06-10
Payer: MEDICARE

## 2024-06-10 DIAGNOSIS — K21.00 GASTROESOPHAGEAL REFLUX DISEASE WITH ESOPHAGITIS WITHOUT HEMORRHAGE: ICD-10-CM

## 2024-06-10 RX ORDER — OMEPRAZOLE 40 MG/1
40 CAPSULE, DELAYED RELEASE ORAL
Qty: 90 CAPSULE | Refills: 0 | Status: SHIPPED | OUTPATIENT
Start: 2024-06-10

## 2024-06-10 NOTE — TELEPHONE ENCOUNTER
Requested appt with GI for follow up re Ceja's esophagus. Dr Raphael recommends bid omeprazole in the interim. Spoke to Mrs Avalos with update.

## 2024-07-15 ENCOUNTER — HOSPITAL ENCOUNTER (OUTPATIENT)
Dept: RADIOLOGY | Facility: HOSPITAL | Age: 74
Discharge: HOME | End: 2024-07-15
Payer: MEDICARE

## 2024-07-15 DIAGNOSIS — C83.10 MANTLE CELL LYMPHOMA, UNSPECIFIED BODY REGION (MULTI): ICD-10-CM

## 2024-07-15 LAB — GLUCOSE BLD MANUAL STRIP-MCNC: 107 MG/DL (ref 74–99)

## 2024-07-15 PROCEDURE — 78815 PET IMAGE W/CT SKULL-THIGH: CPT | Mod: PS

## 2024-07-15 PROCEDURE — 3430000001 HC RX 343 DIAGNOSTIC RADIOPHARMACEUTICALS: Performed by: NURSE PRACTITIONER

## 2024-07-15 PROCEDURE — A9552 F18 FDG: HCPCS | Performed by: NURSE PRACTITIONER

## 2024-07-15 PROCEDURE — 82947 ASSAY GLUCOSE BLOOD QUANT: CPT

## 2024-07-15 RX ORDER — FLUDEOXYGLUCOSE F 18 200 MCI/ML
12.39 INJECTION, SOLUTION INTRAVENOUS
Status: COMPLETED | OUTPATIENT
Start: 2024-07-15 | End: 2024-07-15

## 2024-07-16 ENCOUNTER — APPOINTMENT (OUTPATIENT)
Dept: HEMATOLOGY/ONCOLOGY | Facility: HOSPITAL | Age: 74
End: 2024-07-16
Payer: MEDICARE

## 2024-07-18 DIAGNOSIS — E78.00 PURE HYPERCHOLESTEROLEMIA: ICD-10-CM

## 2024-07-18 RX ORDER — ATORVASTATIN CALCIUM 40 MG/1
20 TABLET, FILM COATED ORAL DAILY
Qty: 45 TABLET | Refills: 1 | Status: SHIPPED | OUTPATIENT
Start: 2024-07-18

## 2024-07-23 ENCOUNTER — OFFICE VISIT (OUTPATIENT)
Dept: HEMATOLOGY/ONCOLOGY | Facility: HOSPITAL | Age: 74
End: 2024-07-23
Payer: MEDICARE

## 2024-07-23 ENCOUNTER — LAB (OUTPATIENT)
Dept: LAB | Facility: HOSPITAL | Age: 74
End: 2024-07-23
Payer: MEDICARE

## 2024-07-23 VITALS
BODY MASS INDEX: 30.44 KG/M2 | HEART RATE: 95 BPM | RESPIRATION RATE: 14 BRPM | WEIGHT: 201.3 LBS | TEMPERATURE: 97 F | SYSTOLIC BLOOD PRESSURE: 132 MMHG | OXYGEN SATURATION: 97 % | DIASTOLIC BLOOD PRESSURE: 78 MMHG

## 2024-07-23 DIAGNOSIS — C83.10 MANTLE CELL LYMPHOMA, UNSPECIFIED BODY REGION (MULTI): Primary | ICD-10-CM

## 2024-07-23 DIAGNOSIS — R79.1 ABNORMAL COAGULATION PROFILE: ICD-10-CM

## 2024-07-23 DIAGNOSIS — I10 HYPERTENSION, UNSPECIFIED TYPE: ICD-10-CM

## 2024-07-23 DIAGNOSIS — E78.00 PURE HYPERCHOLESTEROLEMIA: ICD-10-CM

## 2024-07-23 DIAGNOSIS — C83.10 MANTLE CELL LYMPHOMA, UNSPECIFIED BODY REGION (MULTI): ICD-10-CM

## 2024-07-23 LAB
ALBUMIN SERPL BCP-MCNC: 4.1 G/DL (ref 3.4–5)
ALP SERPL-CCNC: 93 U/L (ref 33–136)
ALT SERPL W P-5'-P-CCNC: 21 U/L (ref 10–52)
AMMONIA PLAS-SCNC: 38 UMOL/L (ref 16–53)
ANION GAP SERPL CALC-SCNC: 13 MMOL/L (ref 10–20)
APTT PPP: 30 SECONDS (ref 27–38)
AST SERPL W P-5'-P-CCNC: 16 U/L (ref 9–39)
BASOPHILS # BLD AUTO: 0.05 X10*3/UL (ref 0–0.1)
BASOPHILS NFR BLD AUTO: 1 %
BILIRUB SERPL-MCNC: 0.5 MG/DL (ref 0–1.2)
BUN SERPL-MCNC: 12 MG/DL (ref 6–23)
CALCIUM SERPL-MCNC: 8.8 MG/DL (ref 8.6–10.3)
CHLORIDE SERPL-SCNC: 105 MMOL/L (ref 98–107)
CHOLEST SERPL-MCNC: 167 MG/DL (ref 0–199)
CHOLESTEROL/HDL RATIO: 4.6
CO2 SERPL-SCNC: 26 MMOL/L (ref 21–32)
CREAT SERPL-MCNC: 0.86 MG/DL (ref 0.5–1.3)
EGFRCR SERPLBLD CKD-EPI 2021: 90 ML/MIN/1.73M*2
EOSINOPHIL # BLD AUTO: 0.15 X10*3/UL (ref 0–0.4)
EOSINOPHIL NFR BLD AUTO: 3 %
ERYTHROCYTE [DISTWIDTH] IN BLOOD BY AUTOMATED COUNT: 13 % (ref 11.5–14.5)
GLUCOSE SERPL-MCNC: 100 MG/DL (ref 74–99)
HCT VFR BLD AUTO: 42.2 % (ref 41–52)
HDLC SERPL-MCNC: 36 MG/DL
HGB BLD-MCNC: 14.4 G/DL (ref 13.5–17.5)
IGG SERPL-MCNC: 577 MG/DL (ref 700–1600)
IMM GRANULOCYTES # BLD AUTO: 0.01 X10*3/UL (ref 0–0.5)
IMM GRANULOCYTES NFR BLD AUTO: 0.2 % (ref 0–0.9)
LDH SERPL L TO P-CCNC: 118 U/L (ref 84–246)
LDLC SERPL CALC-MCNC: 94 MG/DL
LYMPHOCYTES # BLD AUTO: 0.4 X10*3/UL (ref 0.8–3)
LYMPHOCYTES NFR BLD AUTO: 8 %
MCH RBC QN AUTO: 31.4 PG (ref 26–34)
MCHC RBC AUTO-ENTMCNC: 34.1 G/DL (ref 32–36)
MCV RBC AUTO: 92 FL (ref 80–100)
MONOCYTES # BLD AUTO: 0.6 X10*3/UL (ref 0.05–0.8)
MONOCYTES NFR BLD AUTO: 12 %
NEUTROPHILS # BLD AUTO: 3.8 X10*3/UL (ref 1.6–5.5)
NEUTROPHILS NFR BLD AUTO: 75.8 %
NON HDL CHOLESTEROL: 131 MG/DL (ref 0–149)
NRBC BLD-RTO: 0 /100 WBCS (ref 0–0)
PLATELET # BLD AUTO: 161 X10*3/UL (ref 150–450)
POTASSIUM SERPL-SCNC: 4 MMOL/L (ref 3.5–5.3)
PROT SERPL-MCNC: 6.3 G/DL (ref 6.4–8.2)
RBC # BLD AUTO: 4.58 X10*6/UL (ref 4.5–5.9)
SODIUM SERPL-SCNC: 140 MMOL/L (ref 136–145)
TRIGL SERPL-MCNC: 184 MG/DL (ref 0–149)
URATE SERPL-MCNC: 5.2 MG/DL (ref 4–7.5)
VLDL: 37 MG/DL (ref 0–40)
WBC # BLD AUTO: 5 X10*3/UL (ref 4.4–11.3)

## 2024-07-23 PROCEDURE — 82140 ASSAY OF AMMONIA: CPT | Performed by: INTERNAL MEDICINE

## 2024-07-23 PROCEDURE — 82784 ASSAY IGA/IGD/IGG/IGM EACH: CPT | Performed by: NURSE PRACTITIONER

## 2024-07-23 PROCEDURE — 99215 OFFICE O/P EST HI 40 MIN: CPT | Performed by: INTERNAL MEDICINE

## 2024-07-23 PROCEDURE — 80053 COMPREHEN METABOLIC PANEL: CPT

## 2024-07-23 PROCEDURE — 80061 LIPID PANEL: CPT | Performed by: INTERNAL MEDICINE

## 2024-07-23 PROCEDURE — 85025 COMPLETE CBC W/AUTO DIFF WBC: CPT

## 2024-07-23 PROCEDURE — 36415 COLL VENOUS BLD VENIPUNCTURE: CPT

## 2024-07-23 PROCEDURE — 84550 ASSAY OF BLOOD/URIC ACID: CPT

## 2024-07-23 PROCEDURE — 85730 THROMBOPLASTIN TIME PARTIAL: CPT | Performed by: INTERNAL MEDICINE

## 2024-07-23 PROCEDURE — 83615 LACTATE (LD) (LDH) ENZYME: CPT

## 2024-07-23 RX ORDER — ALBUTEROL SULFATE 0.83 MG/ML
3 SOLUTION RESPIRATORY (INHALATION) AS NEEDED
OUTPATIENT
Start: 2024-10-29

## 2024-07-23 RX ORDER — DIPHENHYDRAMINE HYDROCHLORIDE 50 MG/ML
50 INJECTION INTRAMUSCULAR; INTRAVENOUS AS NEEDED
OUTPATIENT
Start: 2024-10-29

## 2024-07-23 RX ORDER — FAMOTIDINE 10 MG/ML
20 INJECTION INTRAVENOUS ONCE AS NEEDED
OUTPATIENT
Start: 2024-10-29

## 2024-07-23 RX ORDER — EPINEPHRINE 0.3 MG/.3ML
0.3 INJECTION SUBCUTANEOUS EVERY 5 MIN PRN
OUTPATIENT
Start: 2024-10-29

## 2024-07-23 ASSESSMENT — PAIN SCALES - GENERAL: PAINLEVEL_OUTOF10: 0-NO PAIN

## 2024-07-23 NOTE — PROGRESS NOTES
HEMATOLOGY/ONCOLOGY CLINIC NOTE    HPI:  Doing same. Still with fatgu  Rest ROS negative    Oncology History and Treatment synopsis  Oncology History Overview Note   Treatment Synopsis:    - Relapsed Mantle Cell Lymphoma, originally diagnosed  Aug 2021 with lymphadenopathy and esophageal mass, biopsy of which consistent with mantle cell lymphoma, Ki67 of 10%  - s/p Zak/Rituxan x 4 cycles, followed by maintenance Rituxan (completed Aug 2022)  - PET scan (2/6/23): multiple FDG avid nodes in mediastinum and hilar area, no LN below diaphragm  - Repeat PET scan (4/24/23): persistent thoracic LN, new intraluminal masses in transverse colon concerning for disease  - BMBx (4/6/23): negative for lymphoma  - Admitted for CART (T0= 5/16/23) prep with flu/cy on CASE 2422  - 5/16/23: CART, infused with 17.5 x10^6/Kg cells     PET CT day +30, D+6, D+90 STEFFI    4/30/24 1 year PET CT:  1. Focal hypermetabolic right hilar lymph node which could be related to inflammatory changes but is concerning for disease recurrence. No other hypermetabolic lymph nodes are visualized. Deauville 5 if  related to recurrent disease.  2. Nonspecific mild hypermetabolic activity in the distal esophagus which may represent inflammatory changes, however, disease involvement cannot be ruled out. Direct visualization can be considered as clinically indicated.  3. No evidence of hypermetabolic activity within the spleen or bone marrow to suggest lymphomatous involvement.    === 07/15/24 ===NM PET CT LYMPHOMA STAGING    1. Interval resolution of the previously noted hypermetabolic right hilar lymph node, which was likely reactive. No hypermetabolic selvin or extranodal disease activity (Deauville score of 1).  2. Interval resolution of the previously noted mild metabolic activity in the distal esophagus.               Mantle cell lymphoma (Multi)   9/19/2023 Initial Diagnosis    Mantle cell lymphoma (CMS/HCC)         PMH:  Past Medical History:   Diagnosis  Date    Acute pharyngitis, unspecified 01/31/2018    Acute pharyngitis    Acute pulmonary embolism with acute cor pulmonale, unspecified pulmonary embolism type (Multi)     Acute sinusitis, unspecified 01/31/2018    Acute sinusitis    Arthritis     Calculus of kidney 01/31/2018    Kidney stone on right side    Chest pain, unspecified 01/31/2018    Chest pain    Cystoid macular degeneration, right eye 01/31/2018    Cystoid macular edema of right eye    Dizziness and giddiness 01/31/2018    Dizziness    Gastro-esophageal reflux disease without esophagitis 01/31/2018    Esophageal reflux    Mantle cell lymphoma (Multi)     Migraine, unspecified, not intractable, without status migrainosus 01/31/2018    Migraine headache    Other fatigue 01/31/2018    Fatigue    Personal history of malignant neoplasm, unspecified     History of malignant neoplasm    Personal history of other diseases of the circulatory system 04/15/2013    History of hypertension    Pure hypercholesterolemia, unspecified 09/24/2013    Low-density-lipoid-type (LDL) hyperlipoproteinemia    Pure hypercholesterolemia, unspecified 01/31/2018    Hypercholesterolemia    Rheumatoid arthritis, unspecified (Multi) 01/31/2018    Rheumatoid arthritis    Thrombocytopenia, unspecified (CMS-HCC) 01/31/2018    Thrombocytopenia    Unspecified osteoarthritis, unspecified site 01/31/2018    Osteoarthritis    Vitamin deficiency, unspecified 01/31/2018    Vitamin deficiency       PSH:  Past Surgical History:   Procedure Laterality Date    CT ABDOMEN PELVIS ANGIOGRAM W AND/OR WO IV CONTRAST  6/9/2023    CT ABDOMEN PELVIS ANGIOGRAM W AND/OR WO IV CONTRAST 6/9/2023 [facility] [place]    HAND SURGERY  09/26/2016    Hand Surgery                                                                                                                                                          IR CVC TUNNELED  5/1/2023    IR CVC TUNNELED 5/1/2023 [facility] [place]GIO    KIDNEY SURGERY  09/26/2016    Kidney Surgery     MR HEAD ANGIO WO IV CONTRAST  9/4/2013    MR HEAD ANGIO WO IV CONTRAST [facility]    OTHER SURGICAL HISTORY  12/07/2022    Lymph node surgery    OTHER SURGICAL HISTORY  09/26/2016    Scler Buckle Repair Sufficient Fluid Drained, Retina Inspect    OTHER SURGICAL HISTORY  09/26/2016    Root Canal       SH:  Social History     Tobacco Use    Smoking status: Never    Smokeless tobacco: Not on file   Substance Use Topics    Alcohol use: Never      has no history on file for drug use.      CURRENT MEDS:  Current Outpatient Medications on File Prior to Visit   Medication Sig Dispense Refill    atorvastatin (Lipitor) 40 mg tablet Take 0.5 tablets (20 mg) by mouth once daily. 45 tablet 1    multivitamin tablet Take 1 tablet by mouth once daily.      omeprazole (PriLOSEC) 40 mg DR capsule Take 1 capsule (40 mg) by mouth 2 times a day before meals. 90 capsule 0     No current facility-administered medications on file prior to visit.       PHYSICAL EXAM:  /78   Pulse 95   Temp 36.1 °C (97 °F) (Temporal)   Resp 14   Wt 91.3 kg (201 lb 4.8 oz)   SpO2 97%   BMI 30.44 kg/m²  Reviewed in chart    ECOG score 0    Physical Exam  Vitals reviewed.   Constitutional:       Appearance: Normal appearance.   HENT:      Head: Normocephalic.      Mouth/Throat:      Mouth: Mucous membranes are moist.      Pharynx: Oropharynx is clear.   Eyes:      Pupils: Pupils are equal, round, and reactive to light.      Comments: Eyepatch left   Cardiovascular:      Rate and Rhythm: Normal rate and regular rhythm.   Pulmonary:      Effort: Pulmonary effort is normal. No respiratory distress.      Breath sounds: Normal breath sounds. No wheezing or rales.   Abdominal:      General: Abdomen is flat. Bowel sounds are normal. There is no distension.      Palpations: Abdomen is soft. There is no mass.   Musculoskeletal:         General: No swelling. Normal range of motion.   Skin:     General: Skin is warm.      Findings: No erythema or  "rash.   Neurological:      General: No focal deficit present.      Mental Status: He is alert and oriented to person, place, and time.   Psychiatric:         Mood and Affect: Mood normal.         Behavior: Behavior normal.         Thought Content: Thought content normal.     LAB DATA:    No components found for: \"CBC\"  Lab Results   Component Value Date    WBC 5.0 07/23/2024    WBC 5.1 05/14/2024    WBC 5.4 05/13/2024    WBC 3.4 (L) 01/26/2024    WBC 2.8 (L) 12/07/2023     Lab Results   Component Value Date    HGB 14.4 07/23/2024    HGB 14.8 05/14/2024    HGB 14.6 05/13/2024    HGB 14.0 01/26/2024    HGB 13.7 12/07/2023     Lab Results   Component Value Date     07/23/2024     05/14/2024     05/13/2024     01/26/2024     12/07/2023       Lab Results   Component Value Date    GLUCOSE 100 (H) 07/23/2024    CALCIUM 8.8 07/23/2024     07/23/2024    K 4.0 07/23/2024    CO2 26 07/23/2024     07/23/2024    BUN 12 07/23/2024    CREATININE 0.86 07/23/2024     Lab Results   Component Value Date    CREATININE 0.86 07/23/2024    CREATININE 0.81 05/14/2024    CREATININE 0.79 05/13/2024    CREATININE 0.90 01/26/2024    CREATININE 0.75 12/07/2023       Lab Results   Component Value Date    ALT 21 07/23/2024    AST 16 07/23/2024    ALKPHOS 93 07/23/2024    BILITOT 0.5 07/23/2024       [facility]  Lab Results   Component Value Date     (L) 07/23/2024     (L) 05/14/2024     12/07/2023     11/21/2023     (L) 10/24/2023       ASSESSMENT AND PLAN  Mr. Avalos is a 71 y/o M w/ PMH of severe vertigo, HLD, GERD/ Ceja's esophagus, prostate cancer (2016) s/p brachytherapy, MCL ( dx: 8/2021) s/p BR x 4 followed  by maintenance ritux, unfortunately found to have evidence of relapsed disease on PET scan (2/6/23) with biopsy confirmation (2/20/23) who was referred for treatment of recurrent MCL. S/p CAR-T, coming today for 1 year post CAR T visit. "     Concerns today - chronic cough, dry, congestion, no constitutional symptoms.   Will get chest CT with contrast for further evaluation.      Relapsed MCL  - intially diagnosed 2021; MIPI: N/A; ECO; KPS of 70  - TP53: negative, ki-67: 10%  - s/p BR x 4 cycles, followed by maintenance ritux x 4 ( last dose 2022)  - PET scan (23): increased uptake in hilar/ mediastinal LN concerning for relapsed disease  - Bronchoscopy (23): biopsy consistent with MCL ( no Ki-67 or TP53 reported)  - BM bx (2023) STEFFI  CAR T Cell therapy: Currently day  + 268 (T0=23)     Treated with clinical trial CASE 2422; preparative regimen included fludarabine 30mg/m2 & cyclophosphamide 500mg/m2 on days T-4, T-3, & T-2.  Hospital course complicated by grade 3 neurotox and grade 2 CRS with fever and confusion; infectious workup negative, CTH negative for acute process, managed w/ toci x2 doses and dexamethasone taper that was completed on . Steroid induced hallucinations  resolved at discharge.      Response Monitoring:   Day 30 response monitoring: PET scan (23) notes CR (Deauville 1)   Day 60 response monitoring: PET scan (23) notes CR (Deauville 1)   Day 90 response monitoring: PET scan (23) notes CR (Deauville 1)  Day +365 response monitoring: PET scan (24) -  some concern over hilar lymph node   DD infectious vs lymphoma - will repeat PET in 2 months.   14 months (24) PET scan has normalized. (Deauville 1) . The patient remains in remission         Infectious prophylaxis:    Antiviral prophylaxis: acyclovir,  stop today 24   PCP prophylaxis: TMP-SMX DS, M/W/F;  stop today 24   Antifungal prophylaxis: fluconazole,  completed     CMV monitoring:  - CMV DNA PCR (with each visit): ND 23     IgG (with each visit): 577 (24),     Persistent cough  Reflux? Will see GI     Anti-infective Vaccines:   - has received vaccines for 6 months and 9 months post CAR-T  -      Cardiac:    ECHO 4/6/23: EF 55-60%, impaired pattern of relaxation, atrial septal aneurism present   ECHO 6/15/23: EF 50-55%, large PFO seen with bubble study, atrial septal aneurism present; no clots seen     Hx HLD: continue atorvastatin  ASA 81 mg restarted at discharge         PE  Patient presented to the ED on 6/8/23 with R sided low back pain that started acutely that evening; pain worse with inspiration. CT revealed bilateral PEs. US BLE negative for DVT.  Started on heparin drip in the ED and patient was admitted for further workup and management of PE.    Initially patient was transitioned to LMWH, however, because of prescription cost he was discharged on Eliquis and will continue for 6mths.  until end of January 2024, since it's deemed provoked in nature  - Completed Eliquis Jan 2024  - instructed to avoid excessive immobility or long travel with no breaks.     Neuro/psych  Hx of vertigo, stable. Able to navigate at home; no falls.   Grade 3 neurotoxicity with onset 5/25/23  Steroid induced hallucinations, rapid dexamethasone taper (10 mg Q6 on 5/25, then Q8 on  5/27& q12 5/28, q24 5/29, then completed during hospitalization on 5/30/23) sx resolved.  6/20 all neuro symptoms long resolved.      Mood  6/2023 Lexapro.      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

## 2024-07-24 LAB
CMV DNA SERPL NAA+PROBE-LOG IU: NORMAL {LOG_IU}/ML
LABORATORY COMMENT REPORT: NOT DETECTED

## 2024-07-26 DIAGNOSIS — C83.10 MANTLE CELL LYMPHOMA, UNSPECIFIED BODY REGION (MULTI): Primary | ICD-10-CM

## 2024-07-29 ENCOUNTER — APPOINTMENT (OUTPATIENT)
Dept: PRIMARY CARE | Facility: CLINIC | Age: 74
End: 2024-07-29
Payer: MEDICARE

## 2024-07-30 ENCOUNTER — APPOINTMENT (OUTPATIENT)
Dept: HEMATOLOGY/ONCOLOGY | Facility: HOSPITAL | Age: 74
End: 2024-07-30
Payer: MEDICARE

## 2024-07-31 ENCOUNTER — TELEPHONE (OUTPATIENT)
Dept: HEMATOLOGY/ONCOLOGY | Facility: HOSPITAL | Age: 74
End: 2024-07-31
Payer: MEDICARE

## 2024-07-31 NOTE — TELEPHONE ENCOUNTER
Spoke to Anders. Danny has Covid. Anders denies sx at this time. Attempting to isolate; masking and hand sanitizing.   Encouraged to call if sx/+test. Checked with our pharmacist: anticipate Paxlovid 300/100 if +, with his CrCl--if his med list is complete, the only interaction is with atorvastatin, which the Paxlovid can increase levels on--shouldn't be a huge deal cause the Paxlovid is only 5 days, so he'll just need to monitor closely for new/emergent muscle pain vs consider holding atorvastatin while on Paxlovid.

## 2024-08-08 ENCOUNTER — APPOINTMENT (OUTPATIENT)
Dept: PRIMARY CARE | Facility: CLINIC | Age: 74
End: 2024-08-08
Payer: MEDICARE

## 2024-08-08 VITALS
WEIGHT: 194 LBS | DIASTOLIC BLOOD PRESSURE: 60 MMHG | SYSTOLIC BLOOD PRESSURE: 90 MMHG | HEIGHT: 68 IN | BODY MASS INDEX: 29.4 KG/M2

## 2024-08-08 DIAGNOSIS — E78.00 PURE HYPERCHOLESTEROLEMIA: ICD-10-CM

## 2024-08-08 DIAGNOSIS — Z00.00 MEDICARE ANNUAL WELLNESS VISIT, INITIAL: Primary | ICD-10-CM

## 2024-08-08 DIAGNOSIS — G47.9 SLEEP DIFFICULTIES: ICD-10-CM

## 2024-08-08 DIAGNOSIS — I10 HYPERTENSION, UNSPECIFIED TYPE: ICD-10-CM

## 2024-08-08 DIAGNOSIS — M19.90 ARTHRITIS: ICD-10-CM

## 2024-08-08 DIAGNOSIS — N40.0 BENIGN PROSTATIC HYPERPLASIA, UNSPECIFIED WHETHER LOWER URINARY TRACT SYMPTOMS PRESENT: ICD-10-CM

## 2024-08-08 DIAGNOSIS — R53.83 OTHER FATIGUE: ICD-10-CM

## 2024-08-08 DIAGNOSIS — K21.9 GASTROESOPHAGEAL REFLUX DISEASE, UNSPECIFIED WHETHER ESOPHAGITIS PRESENT: ICD-10-CM

## 2024-08-08 DIAGNOSIS — Z12.5 PROSTATE CANCER SCREENING: ICD-10-CM

## 2024-08-08 PROBLEM — C34.90 LUNG CANCER (MULTI): Status: RESOLVED | Noted: 2023-09-19 | Resolved: 2024-08-08

## 2024-08-08 RX ORDER — ASPIRIN 81 MG/1
81 TABLET ORAL ONCE
COMMUNITY

## 2024-08-08 RX ORDER — GUAIFENESIN 100 MG/5ML
20 SOLUTION ORAL EVERY 4 HOURS PRN
COMMUNITY
Start: 2023-05-29

## 2024-08-08 RX ORDER — TRAMADOL HYDROCHLORIDE 50 MG/1
50 TABLET ORAL EVERY 6 HOURS PRN
COMMUNITY
Start: 2022-12-07

## 2024-08-08 ASSESSMENT — ENCOUNTER SYMPTOMS
DEPRESSION: 0
OCCASIONAL FEELINGS OF UNSTEADINESS: 0
LOSS OF SENSATION IN FEET: 0

## 2024-08-08 ASSESSMENT — PATIENT HEALTH QUESTIONNAIRE - PHQ9
1. LITTLE INTEREST OR PLEASURE IN DOING THINGS: NOT AT ALL
SUM OF ALL RESPONSES TO PHQ9 QUESTIONS 1 AND 2: 0
2. FEELING DOWN, DEPRESSED OR HOPELESS: NOT AT ALL

## 2024-08-08 ASSESSMENT — ACTIVITIES OF DAILY LIVING (ADL)
DOING_HOUSEWORK: INDEPENDENT
GROCERY_SHOPPING: INDEPENDENT
DRESSING: INDEPENDENT
TAKING_MEDICATION: INDEPENDENT
MANAGING_FINANCES: INDEPENDENT
BATHING: INDEPENDENT

## 2024-08-09 RX ORDER — TRAZODONE HYDROCHLORIDE 50 MG/1
50 TABLET ORAL NIGHTLY PRN
Qty: 30 TABLET | Refills: 0 | Status: SHIPPED | OUTPATIENT
Start: 2024-08-09 | End: 2025-08-09

## 2024-08-09 NOTE — PROGRESS NOTES
"Subjective   Patient ID: Rip Avalos is a 73 y.o. male who presents for Medicare Annual Wellness Visit Initial.    This gentleman today came here for:  1. Medicare Wellness Visit.  2. Follow-up on other conditions.  3. He is a stem cell transplant recipient and he is under transplant team and takes very good care of.  He had all his shots.    IMMUNIZATION: All shots up-to-date.    I have personally reviewed the patient's Past Medical History, Medications, Allergies, Social History, and Family History in the EMR.    Review of Systems   All other systems reviewed and are negative.  The patient never had a stroke.  No heart attack.  No diabetes.  He has left eye problem, he uses patch.     Objective   BP 90/60   Ht 1.727 m (5' 8\")   Wt 88 kg (194 lb)   BMI 29.50 kg/m²     Physical Exam  Vitals reviewed.   HENT:      Right Ear: Tympanic membrane, ear canal and external ear normal.      Left Ear: Tympanic membrane, ear canal and external ear normal.   Eyes:      General: No scleral icterus.     Pupils: Pupils are equal, round, and reactive to light.   Neck:      Vascular: No carotid bruit.   Cardiovascular:      Heart sounds: Normal heart sounds, S1 normal and S2 normal. No murmur heard.     No friction rub.   Pulmonary:      Effort: Pulmonary effort is normal.      Breath sounds: Normal breath sounds and air entry.   Abdominal:      Palpations: There is no hepatomegaly, splenomegaly or mass.   Genitourinary:     Prostate: Normal.   Musculoskeletal:         General: No swelling or deformity. Normal range of motion.      Cervical back: Neck supple.      Right lower leg: No edema.      Left lower leg: No edema.   Lymphadenopathy:      Cervical: No cervical adenopathy.      Upper Body:      Right upper body: No axillary adenopathy.      Left upper body: No axillary adenopathy.      Lower Body: No right inguinal adenopathy. No left inguinal adenopathy.   Neurological:      Mental Status: He is oriented to person, " place, and time.      Cranial Nerves: Cranial nerves 2-12 are intact. No cranial nerve deficit.      Sensory: No sensory deficit.      Motor: Motor function is intact. No weakness.      Gait: Gait is intact.      Deep Tendon Reflexes: Reflexes normal.   Psychiatric:         Mood and Affect: Mood normal. Mood is not anxious or depressed. Affect is not angry.         Behavior: Behavior is not agitated.         Thought Content: Thought content normal.         Judgment: Judgment normal.     LAB WORK: Laboratory testing discussed.    Assessment/Plan   Problem List Items Addressed This Visit             ICD-10-CM       Cardiac and Vasculature    Hypertension I10    Relevant Orders    Comprehensive Metabolic Panel    CBC    Lipid Panel       Gastrointestinal and Abdominal    Esophageal reflux K21.9       Genitourinary and Reproductive    Benign prostatic hyperplasia N40.0       Symptoms and Signs    Fatigue R53.83    Relevant Orders    Urinalysis with Reflex Microscopic    Thyroid Stimulating Hormone     Other Visit Diagnoses         Codes    Medicare annual wellness visit, initial    -  Primary Z00.00    Pure hypercholesterolemia     E78.00    Relevant Orders    Lipid Panel    Prostate cancer screening     Z12.5    Relevant Orders    Prostate Specific Antigen, Screen    Sleep difficulties     G47.9    Arthritis     M19.90        1. Medicare Wellness Visit done.  2. The patient is not depressed. The patient is not suicidal.  3. The patient’s wife is legal power of , he trusts her.  4. The patient is full code.  5. The patient is cancer survivor.  He is seeing cancer team.  6. GERD, on PPI.  7. High cholesterol, on medication.  8. BPH, on medication.  9. Arthritis.  Monitor.  10. Repeat blood work ordered.  He will plan to do it in October.  I will see him in a week after that.  11. His vision, hearing okay.  Memory okay.  12. I thanked him for participating in Medicare Wellness Visit.  13. The patient told me he  has problem with insomnia, on and off difficulty in getting sleep.  He has tried over-the-counter melatonin with minimal reports.  He does not maintain his sleep.  His stress okay.  It is going for a while.  Considering general health, we will try trazodone 50 mg and see how does it work.  14. I told him to come and see me in six weeks for follow-up.  I explained him.    Scribe Attestation  By signing my name below, I, Jaz Burks attest that this documentation has been prepared under the direction and in the presence of Sun Yuen MD.

## 2024-08-20 ENCOUNTER — APPOINTMENT (OUTPATIENT)
Dept: HEMATOLOGY/ONCOLOGY | Facility: HOSPITAL | Age: 74
End: 2024-08-20
Payer: MEDICARE

## 2024-08-21 ENCOUNTER — APPOINTMENT (OUTPATIENT)
Dept: GASTROENTEROLOGY | Facility: CLINIC | Age: 74
End: 2024-08-21
Payer: MEDICARE

## 2024-08-21 VITALS — HEART RATE: 89 BPM | WEIGHT: 196 LBS | OXYGEN SATURATION: 95 % | HEIGHT: 68 IN | BODY MASS INDEX: 29.7 KG/M2

## 2024-08-21 DIAGNOSIS — K22.70 BARRETT'S ESOPHAGUS WITHOUT DYSPLASIA: ICD-10-CM

## 2024-08-21 DIAGNOSIS — Z86.010 HISTORY OF COLONIC POLYPS: ICD-10-CM

## 2024-08-21 DIAGNOSIS — R05.3 CHRONIC COUGH: Primary | ICD-10-CM

## 2024-08-21 DIAGNOSIS — K22.719 BARRETT'S ESOPHAGUS WITH DYSPLASIA: ICD-10-CM

## 2024-08-21 PROBLEM — Z86.0100 HISTORY OF COLONIC POLYPS: Status: ACTIVE | Noted: 2024-08-21

## 2024-08-21 RX ORDER — SODIUM PICOSULFATE, MAGNESIUM OXIDE, AND ANHYDROUS CITRIC ACID 12; 3.5; 1 G/175ML; G/175ML; MG/175ML
LIQUID ORAL
Qty: 350 ML | Refills: 0 | Status: SHIPPED | OUTPATIENT
Start: 2024-08-21

## 2024-08-21 ASSESSMENT — ENCOUNTER SYMPTOMS
FEVER: 0
MYALGIAS: 0
DIZZINESS: 0
DYSURIA: 0
CHILLS: 0
FATIGUE: 0
SHORTNESS OF BREATH: 0
ARTHRALGIAS: 0
COUGH: 1
WEAKNESS: 0
ROS GI COMMENTS: AS PER HPI
UNEXPECTED WEIGHT CHANGE: 0

## 2024-08-21 NOTE — LETTER
August 21, 2024     Ellie Escalante, APRN-CNP  [id number]  [facility] [id number]    Patient: Rip Avalos   YOB: 1950   Date of Visit: 8/21/2024       Dear Ellie Escalante, LANI-CNP:    Thank you for referring Rip Avlaos to me for evaluation. Below are the relevant portions of my assessment and plan of care.    Assessment / Plan:    Chronic cough  He states he was referred here for further evaluation of chronic cough that nobody has been able to figure out.  He notes that the cough is worse at night but is unrelated to eating or sitting up vs. lying down.  His omeprazole dose was doubled to 40mg BID and it made no difference, which would make GERD as a cause of the cough less likely.  He was under the impression that his Ceja's esophagus could cause the cough, but we discussed that Ceja's does not cause any symptoms.  He could have some esophagitis related to GERD or possibly candidal esophagitis that could cause cough.  We will schedule him for an EGD for further evaluation, but if this is unrevealing, then his cough would not likely be GI-related.  He can continue BID omeprazole for now.    History of colonic polyps  Last colonoscopy was in 2017 without polyps, but prior colonoscopy die show a polyp, so he is overdue for surveillance colonoscopy.  Will schedule this to be done at the same time as his EGD.  Discussed prep and procedure in detail, provided handout with instructions.     If you have questions, please do not hesitate to reach out to me.         Sincerely,      Kwesi Raphael MD  [facility]  Clinical , [employer]  Senior Attending Physician, Gastroenterology  [facility]  P: [phone]   F: [phone]          CC: Sun Yuen MD

## 2024-08-21 NOTE — PATIENT INSTRUCTIONS
We will schedule you for an upper endoscopy (EGD) and colonoscopy.  Follow all of the bowel preparation instructions closely, and feel free to call the office with any questions or concerns prior to the procedures.

## 2024-08-21 NOTE — ASSESSMENT & PLAN NOTE
Last colonoscopy was in 2017 without polyps, but prior colonoscopy die show a polyp, so he is overdue for surveillance colonoscopy.  Will schedule this to be done at the same time as his EGD.  Discussed prep and procedure in detail, provided handout with instructions.

## 2024-08-21 NOTE — PROGRESS NOTES
Assessment/Plan    Rip Avalos is a 73 y.o. male who presents to GI clinic for evaluation of chronic cough, also to discuss EGD and colonoscopy.    Chronic cough  He states he was referred here for further evaluation of chronic cough that nobody has been able to figure out.  He notes that the cough is worse at night but is unrelated to eating or sitting up vs. lying down.  His omeprazole dose was doubled to 40mg BID and it made no difference, which would make GERD as a cause of the cough less likely.  He was under the impression that his Ceja's esophagus could cause the cough, but we discussed that Ceja's does not cause any symptoms.  He could have some esophagitis related to GERD or possibly candidal esophagitis that could cause cough.  We will schedule him for an EGD for further evaluation, but if this is unrevealing, then his cough would not likely be GI-related.  He can continue BID omeprazole for now.    History of colonic polyps  Last colonoscopy was in 2017 without polyps, but prior colonoscopy die show a polyp, so he is overdue for surveillance colonoscopy.  Will schedule this to be done at the same time as his EGD.  Discussed prep and procedure in detail, provided handout with instructions.         Subjective     History of Present Illness   Rip Avalos is a 73 y.o. male with PMHx of recurrent mantle cell lymphoma s/p chemotherapy and CAR-T, prostate Ca s/p brachytherapy, PE (no longer on Eliquis), GERD with BE, RA, HTN, HLD who presents to GI clinic for further evaluation of Ceja's esophagus.  Has a chronic cough, mostly at night, mainly dry cough, nobody can find a reason for it  Cough started after second chemo treatment  Oncology doubled his reflux medication, no change in cough  Cough not worse with lying down or sitting up  No EGD since 2017  He is also in need of repeat colonoscopy    Chart review:  Colonoscopy 6/2017 ([facility]) - diverticulosis, otherwise normal  Colonoscopy  "3/2011 with one polyp (no path available)  EGDs in 2017, 2015, 2013 with \"mucosa suggestive of Ceja's\" and hiatal hernia  GI notes mention \"Ceja's esophagus without dysplasia\" but no pathology is available to review    Past Medical History  As per HPI.     Social History  he  reports that he has never smoked. He does not have any smokeless tobacco history on file. He reports that he does not drink alcohol.     Family History  his family history includes Heart failure in his mother; Hyperlipidemia in an other family member; Hypertension in his father and another family member.     Review of Systems  Review of Systems   Constitutional:  Negative for chills, fatigue, fever and unexpected weight change.   Respiratory:  Positive for cough. Negative for shortness of breath.    Cardiovascular:  Negative for chest pain.   Gastrointestinal:         As per HPI   Genitourinary:  Negative for dysuria.   Musculoskeletal:  Negative for arthralgias and myalgias.   Neurological:  Negative for dizziness, syncope and weakness.       Allergies  No Known Allergies    Medications  Current Outpatient Medications   Medication Instructions    atorvastatin (LIPITOR) 20 mg, oral, Daily    guaiFENesin (Robitussin) 100 mg/5 mL syrup 20 mL, oral, Every 4 hours PRN    multivitamin tablet 1 tablet, oral, Daily    omeprazole (PRILOSEC) 40 mg, oral, 2 times daily before meals    sod picosulf-mag ox-citric ac (Clenpiq) 10 mg-3.5 gram- 12 gram/175 mL solution Take one bottle twice as directed by the prep instructions    traZODone (DESYREL) 50 mg, oral, Nightly PRN        Objective     Visit Vitals  Pulse 89   Ht 1.727 m (5' 8\")   Wt 88.9 kg (196 lb)   SpO2 95%   BMI 29.80 kg/m²   Smoking Status Never   BSA 2.07 m²       Physical Exam  Constitutional:       Appearance: Normal appearance.   Eyes:      Extraocular Movements: Extraocular movements intact.   Abdominal:      General: Bowel sounds are normal. There is no distension.      Palpations: " Abdomen is soft.      Tenderness: There is no abdominal tenderness. There is no guarding or rebound.   Skin:     General: Skin is warm and dry.   Neurological:      General: No focal deficit present.      Mental Status: He is alert.   Psychiatric:         Mood and Affect: Mood normal.         Behavior: Behavior normal.           Lab Results   Component Value Date    WBC 5.0 07/23/2024    WBC 5.1 05/14/2024    HGB 14.4 07/23/2024    HGB 14.8 05/14/2024    HCT 42.2 07/23/2024    HCT 43.6 05/14/2024    MCV 92 07/23/2024    MCV 91 05/14/2024     07/23/2024     05/14/2024     Lab Results   Component Value Date     07/23/2024     05/14/2024    K 4.0 07/23/2024    K 4.1 05/14/2024     07/23/2024     05/14/2024    CO2 26 07/23/2024    CO2 26 05/14/2024    BUN 12 07/23/2024    BUN 12 05/14/2024    CREATININE 0.86 07/23/2024    CREATININE 0.81 05/14/2024    CALCIUM 8.8 07/23/2024    CALCIUM 9.0 05/14/2024    PROT 6.3 (L) 07/23/2024    PROT 6.3 (L) 05/14/2024    BILITOT 0.5 07/23/2024    BILITOT 0.6 05/14/2024    ALKPHOS 93 07/23/2024    ALKPHOS 105 05/14/2024    ALT 21 07/23/2024    ALT 19 05/14/2024    AST 16 07/23/2024    AST 15 05/14/2024    GLUCOSE 100 (H) 07/23/2024    GLUCOSE 95 05/14/2024       Recent Imaging    NM PET CT LYMPHOMA STAGING;  7/15/2024 12:17 pm      INDICATION:  Signs/Symptoms:Restaging.      COMPARISON:  PET-CT 05/14/2024      ACCESSION NUMBER(S):  [id number]      ORDERING CLINICIAN:  JULIANO SAWYER      TECHNIQUE:  TECHNIQUE  DIVISION OF NUCLEAR MEDICINE  POSITRON EMISSION TOMOGRAPHY (PET-CT)      The patient received an intravenous dose of 12.4 mCi of Fluorine-18  fluorodeoxyglucose (FDG). Positron emission tomographic (PET) images  from skull base to the mid-thighs were then acquired after a one hour  delay. Also acquired was a contemporaneous low dose non-contrast CT  scan performed for attenuation correction of PET images and anatomic  localization. The PET and  CT images were digitally fused for display.  All images were acquired on a combined PET-CT scanner unit. Some  areas of FDG accumulation may be described in standardized uptake  value (SUV) units.      CODING:  Subsequent Treatment Strategy (PS)      CALIBRATION:  Dose Injection-to-Scan Interval (mins): 57 min  Mediastinal bloodpool SUV (normal 1.5-2.5): 2.1  Blood glucose: 107 mg/dL      FINDINGS:  HEAD AND NECK:  No evidence of focal hypermetabolic lesion in the brain parenchyma,  noting that evaluation is limited because of the expected physiologic  diffuse FDG uptake in the brain. No focal hypermetabolic soft tissue  lesion is seen in the neck. No hypermetabolic cervical  lymphadenopathy is present.      CHEST:  No focal hypermetabolic lesion is seen in the lung parenchyma.  No evidence of hypermetabolic mediastinal, hilar or axillary  lymphadenopathy. Interval resolution of the previously noted  hypermetabolic right hilar lymph node. Interval resolution of the  previously noted mild metabolic activity in the distal esophagus.      ABDOMEN AND PELVIS:  No hypermetabolic soft tissue lesion is present in the abdomen and  pelvis. Brachytherapy seeds are present in the prostate. No evidence  of hypermetabolic lymphadenopathy. Physiologic radiotracer uptake is  present in the liver and spleen with excretion into the bowel loops  and the genitourinary tract.      MUSCULOSKELETAL/EXTREMITIES:  No focal hypermetabolic lesion is seen in the axial or appendicular  to suggest osseous metastasis.      IMPRESSION:  1. Interval resolution of the previously noted hypermetabolic right  hilar lymph node, which was likely reactive. No hypermetabolic selvin  or extranodal disease activity (Deauville score of 1).  2. Interval resolution of the previously noted mild metabolic  activity in the distal esophagus.        Kwesi Raphael MD

## 2024-08-21 NOTE — ASSESSMENT & PLAN NOTE
He states he was referred here for further evaluation of chronic cough that nobody has been able to figure out.  He notes that the cough is worse at night but is unrelated to eating or sitting up vs. lying down.  His omeprazole dose was doubled to 40mg BID and it made no difference, which would make GERD as a cause of the cough less likely.  He was under the impression that his Ceja's esophagus could cause the cough, but we discussed that Ceja's does not cause any symptoms.  He could have some esophagitis related to GERD or possibly candidal esophagitis that could cause cough.  We will schedule him for an EGD for further evaluation, but if this is unrevealing, then his cough would not likely be GI-related.  He can continue BID omeprazole for now.

## 2024-08-28 DIAGNOSIS — Z86.010 HISTORY OF COLONIC POLYPS: ICD-10-CM

## 2024-08-28 RX ORDER — SODIUM PICOSULFATE, MAGNESIUM OXIDE, AND ANHYDROUS CITRIC ACID 12; 3.5; 1 G/175ML; G/175ML; MG/175ML
LIQUID ORAL
Qty: 350 ML | Refills: 0 | Status: SHIPPED | OUTPATIENT
Start: 2024-08-28

## 2024-08-29 ENCOUNTER — LAB REQUISITION (OUTPATIENT)
Dept: LAB | Facility: HOSPITAL | Age: 74
End: 2024-08-29
Payer: MEDICARE

## 2024-08-29 ENCOUNTER — APPOINTMENT (OUTPATIENT)
Dept: GASTROENTEROLOGY | Facility: EXTERNAL LOCATION | Age: 74
End: 2024-08-29
Payer: MEDICARE

## 2024-08-29 DIAGNOSIS — Z86.010 HISTORY OF COLONIC POLYPS: ICD-10-CM

## 2024-08-29 DIAGNOSIS — Z12.11 SPECIAL SCREENING FOR MALIGNANT NEOPLASMS, COLON: ICD-10-CM

## 2024-08-29 DIAGNOSIS — K22.70 BARRETT'S ESOPHAGUS WITHOUT DYSPLASIA: ICD-10-CM

## 2024-08-29 DIAGNOSIS — D12.5 BENIGN NEOPLASM OF SIGMOID COLON: ICD-10-CM

## 2024-08-29 DIAGNOSIS — D12.2 BENIGN NEOPLASM OF ASCENDING COLON: ICD-10-CM

## 2024-08-29 DIAGNOSIS — R05.3 CHRONIC COUGH: Primary | ICD-10-CM

## 2024-08-29 PROCEDURE — 88305 TISSUE EXAM BY PATHOLOGIST: CPT

## 2024-08-29 PROCEDURE — 0753T DGTZ GLS MCRSCP SLD LEVEL IV: CPT

## 2024-08-29 NOTE — PROGRESS NOTES
EGD/colonoscopy performed today 8/29/2024 at the [facility] ([facility]).  See procedure report(s) under Media tab.

## 2024-09-10 LAB
LABORATORY COMMENT REPORT: NORMAL
PATH REPORT.FINAL DX SPEC: NORMAL
PATH REPORT.GROSS SPEC: NORMAL
PATH REPORT.MICROSCOPIC SPEC OTHER STN: NORMAL
PATH REPORT.RELEVANT HX SPEC: NORMAL
PATH REPORT.TOTAL CANCER: NORMAL

## 2024-10-01 DIAGNOSIS — K21.00 GASTROESOPHAGEAL REFLUX DISEASE WITH ESOPHAGITIS WITHOUT HEMORRHAGE: ICD-10-CM

## 2024-10-01 RX ORDER — OMEPRAZOLE 40 MG/1
40 CAPSULE, DELAYED RELEASE ORAL
Qty: 180 CAPSULE | Refills: 0 | Status: SHIPPED | OUTPATIENT
Start: 2024-10-01

## 2024-10-22 ENCOUNTER — LAB (OUTPATIENT)
Dept: LAB | Facility: HOSPITAL | Age: 74
End: 2024-10-22
Payer: MEDICARE

## 2024-10-22 ENCOUNTER — INFUSION (OUTPATIENT)
Dept: HEMATOLOGY/ONCOLOGY | Facility: HOSPITAL | Age: 74
End: 2024-10-22
Payer: MEDICARE

## 2024-10-22 ENCOUNTER — APPOINTMENT (OUTPATIENT)
Dept: HEMATOLOGY/ONCOLOGY | Facility: HOSPITAL | Age: 74
End: 2024-10-22
Payer: MEDICARE

## 2024-10-22 VITALS
DIASTOLIC BLOOD PRESSURE: 77 MMHG | SYSTOLIC BLOOD PRESSURE: 148 MMHG | TEMPERATURE: 97.3 F | BODY MASS INDEX: 30.07 KG/M2 | RESPIRATION RATE: 18 BRPM | HEART RATE: 88 BPM | OXYGEN SATURATION: 97 % | WEIGHT: 197.75 LBS

## 2024-10-22 DIAGNOSIS — R53.83 OTHER FATIGUE: ICD-10-CM

## 2024-10-22 DIAGNOSIS — I10 HYPERTENSION, UNSPECIFIED TYPE: ICD-10-CM

## 2024-10-22 DIAGNOSIS — Z12.5 PROSTATE CANCER SCREENING: ICD-10-CM

## 2024-10-22 DIAGNOSIS — C83.10 MANTLE CELL LYMPHOMA, UNSPECIFIED BODY REGION (MULTI): ICD-10-CM

## 2024-10-22 DIAGNOSIS — E78.00 PURE HYPERCHOLESTEROLEMIA: ICD-10-CM

## 2024-10-22 LAB
ALBUMIN SERPL BCP-MCNC: 4 G/DL (ref 3.4–5)
ALP SERPL-CCNC: 94 U/L (ref 33–136)
ALT SERPL W P-5'-P-CCNC: 16 U/L (ref 10–52)
ANION GAP SERPL CALC-SCNC: 12 MMOL/L (ref 10–20)
AST SERPL W P-5'-P-CCNC: 15 U/L (ref 9–39)
BASOPHILS # BLD AUTO: 0.06 X10*3/UL (ref 0–0.1)
BASOPHILS NFR BLD AUTO: 0.9 %
BILIRUB SERPL-MCNC: 0.5 MG/DL (ref 0–1.2)
BUN SERPL-MCNC: 13 MG/DL (ref 6–23)
CALCIUM SERPL-MCNC: 9 MG/DL (ref 8.6–10.3)
CHLORIDE SERPL-SCNC: 107 MMOL/L (ref 98–107)
CHOLEST SERPL-MCNC: 128 MG/DL (ref 0–199)
CHOLESTEROL/HDL RATIO: 3.4
CO2 SERPL-SCNC: 26 MMOL/L (ref 21–32)
CREAT SERPL-MCNC: 0.75 MG/DL (ref 0.5–1.3)
EGFRCR SERPLBLD CKD-EPI 2021: >90 ML/MIN/1.73M*2
EOSINOPHIL # BLD AUTO: 0.11 X10*3/UL (ref 0–0.4)
EOSINOPHIL NFR BLD AUTO: 1.6 %
ERYTHROCYTE [DISTWIDTH] IN BLOOD BY AUTOMATED COUNT: 13.4 % (ref 11.5–14.5)
GLUCOSE SERPL-MCNC: 114 MG/DL (ref 74–99)
HCT VFR BLD AUTO: 43.4 % (ref 41–52)
HDLC SERPL-MCNC: 37.3 MG/DL
HGB BLD-MCNC: 14.5 G/DL (ref 13.5–17.5)
IGG SERPL-MCNC: 306 MG/DL (ref 700–1600)
IMM GRANULOCYTES # BLD AUTO: 0.02 X10*3/UL (ref 0–0.5)
IMM GRANULOCYTES NFR BLD AUTO: 0.3 % (ref 0–0.9)
LDH SERPL L TO P-CCNC: 127 U/L (ref 84–246)
LDLC SERPL CALC-MCNC: 59 MG/DL
LYMPHOCYTES # BLD AUTO: 0.44 X10*3/UL (ref 0.8–3)
LYMPHOCYTES NFR BLD AUTO: 6.5 %
MCH RBC QN AUTO: 31.3 PG (ref 26–34)
MCHC RBC AUTO-ENTMCNC: 33.4 G/DL (ref 32–36)
MCV RBC AUTO: 94 FL (ref 80–100)
MONOCYTES # BLD AUTO: 0.57 X10*3/UL (ref 0.05–0.8)
MONOCYTES NFR BLD AUTO: 8.4 %
NEUTROPHILS # BLD AUTO: 5.55 X10*3/UL (ref 1.6–5.5)
NEUTROPHILS NFR BLD AUTO: 82.3 %
NON HDL CHOLESTEROL: 91 MG/DL (ref 0–149)
NRBC BLD-RTO: 0 /100 WBCS (ref 0–0)
PLATELET # BLD AUTO: 186 X10*3/UL (ref 150–450)
POTASSIUM SERPL-SCNC: 3.8 MMOL/L (ref 3.5–5.3)
PROT SERPL-MCNC: 6.2 G/DL (ref 6.4–8.2)
PSA SERPL-MCNC: 0.39 NG/ML
RBC # BLD AUTO: 4.64 X10*6/UL (ref 4.5–5.9)
SODIUM SERPL-SCNC: 141 MMOL/L (ref 136–145)
TRIGL SERPL-MCNC: 157 MG/DL (ref 0–149)
TSH SERPL-ACNC: 1.5 MIU/L (ref 0.44–3.98)
URATE SERPL-MCNC: 4.9 MG/DL (ref 4–7.5)
VLDL: 31 MG/DL (ref 0–40)
WBC # BLD AUTO: 6.8 X10*3/UL (ref 4.4–11.3)

## 2024-10-22 PROCEDURE — 84443 ASSAY THYROID STIM HORMONE: CPT

## 2024-10-22 PROCEDURE — 84153 ASSAY OF PSA TOTAL: CPT

## 2024-10-22 PROCEDURE — 80053 COMPREHEN METABOLIC PANEL: CPT

## 2024-10-22 PROCEDURE — 83615 LACTATE (LD) (LDH) ENZYME: CPT

## 2024-10-22 PROCEDURE — 85025 COMPLETE CBC W/AUTO DIFF WBC: CPT

## 2024-10-22 PROCEDURE — 84550 ASSAY OF BLOOD/URIC ACID: CPT

## 2024-10-22 PROCEDURE — 36415 COLL VENOUS BLD VENIPUNCTURE: CPT

## 2024-10-22 PROCEDURE — G0009 ADMIN PNEUMOCOCCAL VACCINE: HCPCS | Performed by: NURSE PRACTITIONER

## 2024-10-22 PROCEDURE — 82784 ASSAY IGA/IGD/IGG/IGM EACH: CPT

## 2024-10-22 PROCEDURE — 2500000004 HC RX 250 GENERAL PHARMACY W/ HCPCS (ALT 636 FOR OP/ED): Performed by: NURSE PRACTITIONER

## 2024-10-22 PROCEDURE — 80061 LIPID PANEL: CPT

## 2024-10-22 PROCEDURE — 90677 PCV20 VACCINE IM: CPT | Performed by: NURSE PRACTITIONER

## 2024-10-22 RX ORDER — EPINEPHRINE 0.3 MG/.3ML
0.3 INJECTION SUBCUTANEOUS EVERY 5 MIN PRN
OUTPATIENT
Start: 2024-10-22

## 2024-10-22 RX ORDER — ALBUTEROL SULFATE 0.83 MG/ML
3 SOLUTION RESPIRATORY (INHALATION) AS NEEDED
OUTPATIENT
Start: 2024-10-22

## 2024-10-22 RX ORDER — FAMOTIDINE 10 MG/ML
20 INJECTION INTRAVENOUS ONCE AS NEEDED
OUTPATIENT
Start: 2024-10-22

## 2024-10-22 RX ORDER — DIPHENHYDRAMINE HYDROCHLORIDE 50 MG/ML
50 INJECTION INTRAMUSCULAR; INTRAVENOUS AS NEEDED
OUTPATIENT
Start: 2024-10-22

## 2024-10-22 ASSESSMENT — PAIN SCALES - GENERAL: PAINLEVEL_OUTOF10: 0-NO PAIN

## 2024-10-24 ENCOUNTER — APPOINTMENT (OUTPATIENT)
Dept: OPHTHALMOLOGY | Facility: CLINIC | Age: 74
End: 2024-10-24
Payer: MEDICARE

## 2024-10-24 DIAGNOSIS — H40.001 GLAUCOMA SUSPECT OF RIGHT EYE: Primary | ICD-10-CM

## 2024-10-24 DIAGNOSIS — Z86.69 H/O RETINAL DETACHMENT: ICD-10-CM

## 2024-10-24 DIAGNOSIS — H27.02 APHAKIA OF LEFT EYE: ICD-10-CM

## 2024-10-24 DIAGNOSIS — Q13.1 ANIRIDIA: ICD-10-CM

## 2024-10-24 DIAGNOSIS — H52.221 REGULAR ASTIGMATISM OF RIGHT EYE: ICD-10-CM

## 2024-10-24 DIAGNOSIS — H40.32X3 GLAUCOMA OF LEFT EYE ASSOCIATED WITH OCULAR TRAUMA, SEVERE STAGE: ICD-10-CM

## 2024-10-24 LAB
CMV DNA SERPL NAA+PROBE-LOG IU: NORMAL {LOG_IU}/ML
LABORATORY COMMENT REPORT: NOT DETECTED

## 2024-10-24 ASSESSMENT — CONF VISUAL FIELD
OD_NORMAL: 1
OD_SUPERIOR_NASAL_RESTRICTION: 0
OD_INFERIOR_NASAL_RESTRICTION: 0
OS_INFERIOR_TEMPORAL_RESTRICTION: 1
OD_INFERIOR_TEMPORAL_RESTRICTION: 0
OS_SUPERIOR_TEMPORAL_RESTRICTION: 1
OD_SUPERIOR_TEMPORAL_RESTRICTION: 0
OS_SUPERIOR_NASAL_RESTRICTION: 1
OS_INFERIOR_NASAL_RESTRICTION: 1

## 2024-10-24 ASSESSMENT — ENCOUNTER SYMPTOMS
CARDIOVASCULAR NEGATIVE: 0
HEMATOLOGIC/LYMPHATIC NEGATIVE: 0
PSYCHIATRIC NEGATIVE: 0
EYES NEGATIVE: 1
RESPIRATORY NEGATIVE: 0
CONSTITUTIONAL NEGATIVE: 0
ALLERGIC/IMMUNOLOGIC NEGATIVE: 0
MUSCULOSKELETAL NEGATIVE: 0
ENDOCRINE NEGATIVE: 0
NEUROLOGICAL NEGATIVE: 0
GASTROINTESTINAL NEGATIVE: 0

## 2024-10-24 ASSESSMENT — REFRACTION_MANIFEST
OD_SPHERE: -0.50
OD_CYLINDER: +1.00
OD_ADD: +2.50
OD_AXIS: 175
OS_SPHERE: BALANCE

## 2024-10-24 ASSESSMENT — REFRACTION_WEARINGRX
OD_AXIS: 178
OS_SPHERE: BALANCE
OD_SPHERE: -0.75
OD_CYLINDER: +1.50
OD_ADD: +2.75

## 2024-10-24 ASSESSMENT — SLIT LAMP EXAM - LIDS
COMMENTS: MGD UL/LL
COMMENTS: MGD UL/LL

## 2024-10-24 ASSESSMENT — VISUAL ACUITY
OD_CC: 20/20
OS_CC: NLP
METHOD: SNELLEN - LINEAR

## 2024-10-24 ASSESSMENT — EXTERNAL EXAM - RIGHT EYE: OD_EXAM: DERMATOCHALASIS UL

## 2024-10-24 ASSESSMENT — TONOMETRY
OD_IOP_MMHG: 22
OS_IOP_MMHG: 35
IOP_METHOD: GOLDMANN APPLANATION

## 2024-10-24 ASSESSMENT — CUP TO DISC RATIO: OD_RATIO: .50

## 2024-10-24 ASSESSMENT — EXTERNAL EXAM - LEFT EYE: OS_EXAM: DERMATOCHALASIS UL

## 2024-10-24 NOTE — PROGRESS NOTES
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  -intraocular pressure (IOP) today stable 22/35 c TMAX 21/40  -s/p ce/iol/istent OD november 2021   -no pain in the left eye  -not currently on drops   -ONH appearance stable right eye (OD)  -Last OCT RNFL today 10/24/24 stable right eye (OD) to previous without progression; sup and inf quad borderline thinning-normal statistical rate of progression  -HVF 24-2 4/2024 OD stable with scattered non glaucomatous points  -Ed on importance of monitoring to rule out signs of progression  -Okay to continue off drops; especially left eye (OS) since eye is no light perception (NLP) and without pain. If IOP creeps back up to >25 we will start txt at that time for conservative measure in monocular pt.  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 Monocular precautions discussed with FTW and polycarbonte.             RTC 6 months for HVF 24-2 and IOP check

## 2024-10-29 ENCOUNTER — INFUSION (OUTPATIENT)
Dept: HEMATOLOGY/ONCOLOGY | Facility: HOSPITAL | Age: 74
End: 2024-10-29
Payer: MEDICARE

## 2024-10-29 ENCOUNTER — OFFICE VISIT (OUTPATIENT)
Dept: HEMATOLOGY/ONCOLOGY | Facility: HOSPITAL | Age: 74
End: 2024-10-29
Payer: MEDICARE

## 2024-10-29 ENCOUNTER — APPOINTMENT (OUTPATIENT)
Dept: HEMATOLOGY/ONCOLOGY | Facility: HOSPITAL | Age: 74
End: 2024-10-29
Payer: MEDICARE

## 2024-10-29 ENCOUNTER — EDUCATION (OUTPATIENT)
Dept: HEMATOLOGY/ONCOLOGY | Facility: HOSPITAL | Age: 74
End: 2024-10-29
Payer: MEDICARE

## 2024-10-29 ENCOUNTER — HOSPITAL ENCOUNTER (OUTPATIENT)
Dept: RADIOLOGY | Facility: HOSPITAL | Age: 74
Discharge: HOME | End: 2024-10-29
Payer: MEDICARE

## 2024-10-29 VITALS
WEIGHT: 194.9 LBS | RESPIRATION RATE: 16 BRPM | OXYGEN SATURATION: 98 % | TEMPERATURE: 96.3 F | DIASTOLIC BLOOD PRESSURE: 63 MMHG | BODY MASS INDEX: 29.63 KG/M2 | SYSTOLIC BLOOD PRESSURE: 140 MMHG | HEART RATE: 82 BPM

## 2024-10-29 VITALS — SYSTOLIC BLOOD PRESSURE: 116 MMHG | DIASTOLIC BLOOD PRESSURE: 64 MMHG | HEART RATE: 71 BPM

## 2024-10-29 DIAGNOSIS — D84.9 IMMUNOCOMPROMISED STATE: ICD-10-CM

## 2024-10-29 DIAGNOSIS — D80.1 HYPOGAMMAGLOBULINEMIA (MULTI): Primary | ICD-10-CM

## 2024-10-29 DIAGNOSIS — J06.9 ACUTE UPPER RESPIRATORY INFECTION, UNSPECIFIED: ICD-10-CM

## 2024-10-29 DIAGNOSIS — D80.1 HYPOGAMMAGLOBULINEMIA (MULTI): ICD-10-CM

## 2024-10-29 DIAGNOSIS — Z92.21 PERSONAL HISTORY OF ANTINEOPLASTIC CHEMOTHERAPY: ICD-10-CM

## 2024-10-29 DIAGNOSIS — Z92.850 HISTORY OF CHIMERIC ANTIGEN RECEPTOR T-CELL THERAPY: ICD-10-CM

## 2024-10-29 DIAGNOSIS — C83.10 MANTLE CELL LYMPHOMA, UNSPECIFIED BODY REGION (MULTI): ICD-10-CM

## 2024-10-29 DIAGNOSIS — D84.9 IMMUNOCOMPROMISED STATE: Primary | ICD-10-CM

## 2024-10-29 LAB
FLUAV RNA RESP QL NAA+PROBE: NOT DETECTED
FLUBV RNA RESP QL NAA+PROBE: NOT DETECTED
HADV DNA SPEC QL NAA+PROBE: NOT DETECTED
HMPV RNA SPEC QL NAA+PROBE: NOT DETECTED
HPIV1 RNA SPEC QL NAA+PROBE: NOT DETECTED
HPIV2 RNA SPEC QL NAA+PROBE: NOT DETECTED
HPIV3 RNA SPEC QL NAA+PROBE: NOT DETECTED
HPIV4 RNA SPEC QL NAA+PROBE: NOT DETECTED
RHINOVIRUS RNA UPPER RESP QL NAA+PROBE: NOT DETECTED
RSV RNA RESP QL NAA+PROBE: NOT DETECTED
SARS-COV-2 RNA RESP QL NAA+PROBE: NOT DETECTED

## 2024-10-29 PROCEDURE — 87486 CHLMYD PNEUM DNA AMP PROBE: CPT | Performed by: INTERNAL MEDICINE

## 2024-10-29 PROCEDURE — 87637 SARSCOV2&INF A&B&RSV AMP PRB: CPT | Performed by: INTERNAL MEDICINE

## 2024-10-29 PROCEDURE — 2500000004 HC RX 250 GENERAL PHARMACY W/ HCPCS (ALT 636 FOR OP/ED): Performed by: INTERNAL MEDICINE

## 2024-10-29 PROCEDURE — 71250 CT THORAX DX C-: CPT

## 2024-10-29 PROCEDURE — 96365 THER/PROPH/DIAG IV INF INIT: CPT | Mod: INF

## 2024-10-29 PROCEDURE — 99215 OFFICE O/P EST HI 40 MIN: CPT | Mod: 25 | Performed by: INTERNAL MEDICINE

## 2024-10-29 PROCEDURE — 2500000004 HC RX 250 GENERAL PHARMACY W/ HCPCS (ALT 636 FOR OP/ED): Mod: JZ | Performed by: INTERNAL MEDICINE

## 2024-10-29 PROCEDURE — 2500000001 HC RX 250 WO HCPCS SELF ADMINISTERED DRUGS (ALT 637 FOR MEDICARE OP): Performed by: INTERNAL MEDICINE

## 2024-10-29 PROCEDURE — 87631 RESP VIRUS 3-5 TARGETS: CPT | Mod: CCI | Performed by: INTERNAL MEDICINE

## 2024-10-29 PROCEDURE — 96375 TX/PRO/DX INJ NEW DRUG ADDON: CPT | Mod: INF

## 2024-10-29 PROCEDURE — 96374 THER/PROPH/DIAG INJ IV PUSH: CPT | Mod: INF

## 2024-10-29 PROCEDURE — 87631 RESP VIRUS 3-5 TARGETS: CPT | Performed by: INTERNAL MEDICINE

## 2024-10-29 PROCEDURE — 96366 THER/PROPH/DIAG IV INF ADDON: CPT | Mod: INF

## 2024-10-29 RX ORDER — DIPHENHYDRAMINE HCL 25 MG
25 CAPSULE ORAL ONCE
Status: CANCELLED | OUTPATIENT
Start: 2024-10-29

## 2024-10-29 RX ORDER — ALBUTEROL SULFATE 0.83 MG/ML
3 SOLUTION RESPIRATORY (INHALATION) AS NEEDED
OUTPATIENT
Start: 2024-11-14

## 2024-10-29 RX ORDER — FAMOTIDINE 10 MG/ML
20 INJECTION INTRAVENOUS ONCE AS NEEDED
OUTPATIENT
Start: 2024-11-14

## 2024-10-29 RX ORDER — HEPARIN SODIUM,PORCINE/PF 10 UNIT/ML
50 SYRINGE (ML) INTRAVENOUS AS NEEDED
Status: CANCELLED | OUTPATIENT
Start: 2024-10-29

## 2024-10-29 RX ORDER — HEPARIN 100 UNIT/ML
500 SYRINGE INTRAVENOUS AS NEEDED
Status: CANCELLED | OUTPATIENT
Start: 2024-10-29

## 2024-10-29 RX ORDER — ALBUTEROL SULFATE 0.83 MG/ML
3 SOLUTION RESPIRATORY (INHALATION) AS NEEDED
Status: CANCELLED | OUTPATIENT
Start: 2024-10-29

## 2024-10-29 RX ORDER — EPINEPHRINE 0.3 MG/.3ML
0.3 INJECTION SUBCUTANEOUS EVERY 5 MIN PRN
Status: CANCELLED | OUTPATIENT
Start: 2024-10-29

## 2024-10-29 RX ORDER — ACETAMINOPHEN 325 MG/1
650 TABLET ORAL ONCE
OUTPATIENT
Start: 2024-11-14

## 2024-10-29 RX ORDER — FAMOTIDINE 10 MG/ML
20 INJECTION INTRAVENOUS ONCE AS NEEDED
Status: CANCELLED | OUTPATIENT
Start: 2024-10-29

## 2024-10-29 RX ORDER — EPINEPHRINE 0.3 MG/.3ML
0.3 INJECTION SUBCUTANEOUS EVERY 5 MIN PRN
OUTPATIENT
Start: 2024-11-14

## 2024-10-29 RX ORDER — DIPHENHYDRAMINE HYDROCHLORIDE 50 MG/ML
50 INJECTION INTRAMUSCULAR; INTRAVENOUS AS NEEDED
Status: CANCELLED | OUTPATIENT
Start: 2024-10-29

## 2024-10-29 RX ORDER — DIPHENHYDRAMINE HYDROCHLORIDE 50 MG/ML
50 INJECTION INTRAMUSCULAR; INTRAVENOUS AS NEEDED
OUTPATIENT
Start: 2024-11-14

## 2024-10-29 RX ORDER — HEPARIN 100 UNIT/ML
500 SYRINGE INTRAVENOUS AS NEEDED
OUTPATIENT
Start: 2024-10-29

## 2024-10-29 RX ORDER — DIPHENHYDRAMINE HCL 25 MG
25 CAPSULE ORAL ONCE
Status: COMPLETED | OUTPATIENT
Start: 2024-10-29 | End: 2024-10-29

## 2024-10-29 RX ORDER — DIPHENHYDRAMINE HCL 25 MG
25 CAPSULE ORAL ONCE
OUTPATIENT
Start: 2024-11-14

## 2024-10-29 RX ORDER — HEPARIN SODIUM,PORCINE/PF 10 UNIT/ML
50 SYRINGE (ML) INTRAVENOUS AS NEEDED
OUTPATIENT
Start: 2024-10-29

## 2024-10-29 RX ORDER — ACETAMINOPHEN 325 MG/1
650 TABLET ORAL ONCE
Status: COMPLETED | OUTPATIENT
Start: 2024-10-29 | End: 2024-10-29

## 2024-10-29 RX ORDER — ACETAMINOPHEN 325 MG/1
650 TABLET ORAL ONCE
Status: CANCELLED | OUTPATIENT
Start: 2024-10-29

## 2024-10-29 ASSESSMENT — PAIN SCALES - GENERAL: PAINLEVEL_OUTOF10: 0-NO PAIN

## 2024-10-31 LAB
CHLAMYDIA.PNEUMONIAE PCR, VIRC: NOT DETECTED
MYCOPLASMA.PNEUMONIAE PCR, VIRC: NOT DETECTED

## 2024-11-01 DIAGNOSIS — D84.9 IMMUNOCOMPROMISED STATE: Primary | ICD-10-CM

## 2024-11-01 RX ORDER — AZITHROMYCIN 250 MG/1
250 TABLET, FILM COATED ORAL DAILY
Qty: 6 TABLET | Refills: 0 | Status: SHIPPED | OUTPATIENT
Start: 2024-11-01 | End: 2024-11-06

## 2024-11-21 ENCOUNTER — APPOINTMENT (OUTPATIENT)
Dept: PRIMARY CARE | Facility: CLINIC | Age: 74
End: 2024-11-21
Payer: MEDICARE

## 2024-11-21 VITALS
SYSTOLIC BLOOD PRESSURE: 124 MMHG | HEIGHT: 68 IN | DIASTOLIC BLOOD PRESSURE: 62 MMHG | BODY MASS INDEX: 30.16 KG/M2 | WEIGHT: 199 LBS

## 2024-11-21 DIAGNOSIS — R05.9 COUGH, UNSPECIFIED TYPE: ICD-10-CM

## 2024-11-21 DIAGNOSIS — I50.9 CONGESTIVE HEART FAILURE, UNSPECIFIED HF CHRONICITY, UNSPECIFIED HEART FAILURE TYPE: ICD-10-CM

## 2024-11-21 DIAGNOSIS — R53.1 WEAKNESS: ICD-10-CM

## 2024-11-21 DIAGNOSIS — R06.02 SOB (SHORTNESS OF BREATH): ICD-10-CM

## 2024-11-21 DIAGNOSIS — G47.00 INSOMNIA, UNSPECIFIED TYPE: ICD-10-CM

## 2024-11-21 DIAGNOSIS — J45.909 ASTHMATIC BRONCHITIS WITHOUT COMPLICATION, UNSPECIFIED ASTHMA SEVERITY, UNSPECIFIED WHETHER PERSISTENT (HHS-HCC): Primary | ICD-10-CM

## 2024-11-21 DIAGNOSIS — K21.9 GASTROESOPHAGEAL REFLUX DISEASE, UNSPECIFIED WHETHER ESOPHAGITIS PRESENT: ICD-10-CM

## 2024-11-21 DIAGNOSIS — E78.00 HIGH CHOLESTEROL: ICD-10-CM

## 2024-11-21 DIAGNOSIS — C83.10 MANTLE CELL LYMPHOMA, UNSPECIFIED BODY REGION (MULTI): ICD-10-CM

## 2024-11-21 DIAGNOSIS — I25.3 ATRIAL SEPTAL ANEURYSM: ICD-10-CM

## 2024-11-21 PROBLEM — C61 MALIGNANT NEOPLASM OF PROSTATE (MULTI): Status: ACTIVE | Noted: 2024-11-21

## 2024-11-21 RX ORDER — ALBUTEROL SULFATE 90 UG/1
2 INHALANT RESPIRATORY (INHALATION) EVERY 4 HOURS PRN
Qty: 6.7 G | Refills: 0 | Status: SHIPPED | OUTPATIENT
Start: 2024-11-21 | End: 2025-11-21

## 2024-11-21 RX ORDER — BUDESONIDE AND FORMOTEROL FUMARATE DIHYDRATE 80; 4.5 UG/1; UG/1
2 AEROSOL RESPIRATORY (INHALATION)
Qty: 10.2 G | Refills: 5 | Status: SHIPPED | OUTPATIENT
Start: 2024-11-21 | End: 2025-11-21

## 2024-11-21 RX ORDER — DOXYCYCLINE 100 MG/1
100 CAPSULE ORAL 2 TIMES DAILY
Qty: 20 CAPSULE | Refills: 0 | Status: SHIPPED | OUTPATIENT
Start: 2024-11-21 | End: 2024-12-01

## 2024-11-21 RX ORDER — ALBUTEROL SULFATE 0.83 MG/ML
3 SOLUTION RESPIRATORY (INHALATION) ONCE
OUTPATIENT
Start: 2024-11-21 | End: 2024-11-21

## 2024-11-21 RX ORDER — ALBUTEROL SULFATE 90 UG/1
1 INHALANT RESPIRATORY (INHALATION) ONCE
OUTPATIENT
Start: 2024-11-21

## 2024-11-22 NOTE — PROGRESS NOTES
"Subjective   Patient ID: Rip Avalos is a 73 y.o. male who presents for Follow-up.    Mr. Avalos is not good.  He is short of breath, dyspnea on exertion, no endurance.  Cough, congestion, wheezing, not feeling good.  He is seeing the cancer doctor.  He came for follow-up on various conditions.  He had a CT scan taken.    I have personally reviewed the patient's Past Medical History, Medications, Allergies, Social History, and Family History in the EMR.    Review of Systems   All other systems reviewed and are negative.    Objective   /62   Ht 1.727 m (5' 8\")   Wt 90.3 kg (199 lb)   BMI 30.26 kg/m²     Physical Exam  Vitals reviewed.   HENT:      Nose: Congestion present.      Comments: Postnasal drip.     Mouth/Throat:      Comments: Throat congested.  Cardiovascular:      Heart sounds: Normal heart sounds, S1 normal and S2 normal. No murmur heard.     No friction rub.   Pulmonary:      Effort: Pulmonary effort is normal.      Breath sounds: Wheezing present.      Comments: Basal crepitus.  Abdominal:      Palpations: There is no hepatomegaly, splenomegaly or mass.   Musculoskeletal:      Right lower leg: No edema.      Left lower leg: No edema.   Lymphadenopathy:      Lower Body: No right inguinal adenopathy. No left inguinal adenopathy.   Neurological:      Cranial Nerves: Cranial nerves 2-12 are intact.      Sensory: No sensory deficit.      Motor: Motor function is intact.      Deep Tendon Reflexes: Reflexes are normal and symmetric.     LAB WORK: Laboratory testing discussed.    Assessment/Plan   Problem List Items Addressed This Visit             ICD-10-CM       Gastrointestinal and Abdominal    Esophageal reflux K21.9       Hematology and Neoplasia    Mantle cell lymphoma C83.10     Other Visit Diagnoses         Codes    Asthmatic bronchitis without complication, unspecified asthma severity, unspecified whether persistent ([facility])    -  Primary J45.909    Congestive heart failure, " unspecified HF chronicity, unspecified heart failure type     I50.9    Relevant Orders    Referral to Pulmonology    Transthoracic Echo Complete    Arterial Blood Gas (ABG)    Complete Pulmonary Function Test Pre/Post Bronchodilator (Spirometry Pre/Post/DLCO/Lung Volumes)    Cough, unspecified type     R05.9    Relevant Medications    doxycycline (Vibramycin) 100 mg capsule    budesonide-formoteroL (Symbicort) 80-4.5 mcg/actuation inhaler    albuterol (Proventil HFA) 90 mcg/actuation inhaler    SOB (shortness of breath)     R06.02    Relevant Orders    Transthoracic Echo Complete    Atrial septal aneurysm     I25.3    Weakness     R53.1    Insomnia, unspecified type     G47.00    High cholesterol     E78.00        1. Asthmatic bronchitis, cough.  CT scan showed possibility of interstitial lung disease, I am not sure.  He was a smoker before, longtime ago.  I ordered lung function tests, ABG.  He might need high resolution CT scan.  I ordered doxycycline, Symbicort, albuterol.  Referred to pulmonary specialist for evaluation.  2. Atrial septal aneurysm as well as possible CHF.  He had echo done more than a year ago.  There is no follow-up on it.  I ordered 2D echo.  Referred to Dr. Adam for cardiac consultation.  3. Cancer ______.  Monitor.  4. Weakness.  I will monitor.  I advised him not to do over exertion.  He understands.  5. Insomnia ______.  6. GERD, on PPI.  7. High cholesterol, on medication.  8. Blood work okay.  9. I shall see him back in a week after the testing.  If situation changes, he will call me.  I will see him right away.    Scribe Attestation  By signing my name below, I, Davina Baez, Jaz attest that this documentation has been prepared under the direction and in the presence of Sun Yuen MD.

## 2024-11-26 ENCOUNTER — APPOINTMENT (OUTPATIENT)
Dept: HEMATOLOGY/ONCOLOGY | Facility: HOSPITAL | Age: 74
End: 2024-11-26
Payer: MEDICARE

## 2024-12-07 ENCOUNTER — TELEPHONE (OUTPATIENT)
Dept: HEMATOLOGY/ONCOLOGY | Facility: HOSPITAL | Age: 74
End: 2024-12-07
Payer: MEDICARE

## 2024-12-07 NOTE — TELEPHONE ENCOUNTER
I received a call from the answering service that they thought the patient had missed a call from us.  I attempted to call them back at both listed numbers and they did not answer.  I left a message on the identified line of [phone].

## 2024-12-07 NOTE — TELEPHONE ENCOUNTER
"Received a page via the answering service that patient has covid.      Call to patient and no answer.    Called alternate phone number of [phone],    Patient did a home test.  He felt bad on the ride home.  He had a runny nose and is now coughing.      Patient does not feel like he has a fever.  Runny nose with a \"water like fluid coming out\"  clear.  Cough - non productive.  He has had a dry cough since last chemo - but this is not usual for him.    He states I  am exhausted.   He feels like he can't catch his breath.  He has permanent vertigo which is not any worse.  No GI issues.  No burning urination.  No muscle aches.    He gets IVIG and the last dose was the end of October.  His last level was October 22 with an Igg level of 306.      He is not currently on any cancer meds.    CAR-T 5/16/23    Per Dr. Osborn:  I think. He can watch his symptoms tonight. If it's getting worse like high fever, increase productive cough, feeling more weak, recommending going to ED to get evaluated and get some IV fluid with covid medication with test tmrw. also please tell him drink water as much as possible for hydration.   Patient states understanding.  He states if he goes to the emergency room he will come to main campus.    We discussed if he does that he should tell them about his history of CAR-T and his Igg levels.    He states understanding via teachback.                "

## 2024-12-09 ENCOUNTER — TELEPHONE (OUTPATIENT)
Dept: PRIMARY CARE | Facility: CLINIC | Age: 74
End: 2024-12-09
Payer: MEDICARE

## 2024-12-10 ENCOUNTER — TELEMEDICINE (OUTPATIENT)
Dept: PRIMARY CARE | Facility: CLINIC | Age: 74
End: 2024-12-10
Payer: MEDICARE

## 2024-12-10 DIAGNOSIS — C83.10 MANTLE CELL LYMPHOMA, UNSPECIFIED BODY REGION (MULTI): ICD-10-CM

## 2024-12-10 DIAGNOSIS — E78.00 HIGH CHOLESTEROL: ICD-10-CM

## 2024-12-10 DIAGNOSIS — D84.9 IMMUNOCOMPROMISED STATE: ICD-10-CM

## 2024-12-10 DIAGNOSIS — R73.03 PRE-DIABETES: ICD-10-CM

## 2024-12-10 DIAGNOSIS — U07.1 COVID: ICD-10-CM

## 2024-12-10 DIAGNOSIS — I10 HYPERTENSION, UNSPECIFIED TYPE: ICD-10-CM

## 2024-12-10 RX ORDER — HYDROCHLOROTHIAZIDE 12.5 MG/1
CAPSULE ORAL
Qty: 2 EACH | Refills: 5 | Status: CANCELLED | OUTPATIENT
Start: 2024-12-10

## 2024-12-10 RX ORDER — GUAIFENESIN 100 MG/5ML
20 SOLUTION ORAL EVERY 4 HOURS PRN
Qty: 120 ML | Refills: 0 | Status: SHIPPED | OUTPATIENT
Start: 2024-12-10

## 2024-12-10 RX ORDER — LORATADINE 10 MG/1
10 TABLET ORAL DAILY
Qty: 30 TABLET | Refills: 2 | Status: SHIPPED | OUTPATIENT
Start: 2024-12-10 | End: 2025-03-10

## 2024-12-10 NOTE — TELEPHONE ENCOUNTER
Per Mir Kim, Mr. Avalos should hold atorvastatin and trazodone while taking paxlovid. Spoke with Mr. Avalos and let him know to hold these two medications until finished with paxlovid script.

## 2024-12-10 NOTE — TELEPHONE ENCOUNTER
Spoke with Mr. Avalos. He is still feeling unwell, congested, coughing, having fevers. His PCP prescribed Paxlovid and is planning on starting tonight. Message sent to Mir Kim PharmD to confirm medication interactions. Also scheduled Mr. Avalos for mal heme appointment tomorrow to assess condition. Patient agreeable to plan.

## 2024-12-11 ENCOUNTER — HOSPITAL ENCOUNTER (OUTPATIENT)
Dept: RADIOLOGY | Facility: HOSPITAL | Age: 74
Discharge: HOME | End: 2024-12-11
Payer: MEDICARE

## 2024-12-11 ENCOUNTER — INFUSION (OUTPATIENT)
Dept: HEMATOLOGY/ONCOLOGY | Facility: HOSPITAL | Age: 74
End: 2024-12-11
Payer: MEDICARE

## 2024-12-11 ENCOUNTER — OFFICE VISIT (OUTPATIENT)
Dept: HEMATOLOGY/ONCOLOGY | Facility: HOSPITAL | Age: 74
End: 2024-12-11
Payer: MEDICARE

## 2024-12-11 VITALS
RESPIRATION RATE: 18 BRPM | DIASTOLIC BLOOD PRESSURE: 81 MMHG | SYSTOLIC BLOOD PRESSURE: 126 MMHG | TEMPERATURE: 97.9 F | OXYGEN SATURATION: 97 % | HEART RATE: 81 BPM

## 2024-12-11 DIAGNOSIS — D84.9 IMMUNOCOMPROMISED STATE: ICD-10-CM

## 2024-12-11 DIAGNOSIS — C83.10 MANTLE CELL LYMPHOMA, UNSPECIFIED BODY REGION (MULTI): ICD-10-CM

## 2024-12-11 DIAGNOSIS — C83.10 MANTLE CELL LYMPHOMA, UNSPECIFIED BODY REGION (MULTI): Primary | ICD-10-CM

## 2024-12-11 LAB
ALBUMIN SERPL BCP-MCNC: 4.2 G/DL (ref 3.4–5)
ALP SERPL-CCNC: 82 U/L (ref 33–136)
ALT SERPL W P-5'-P-CCNC: 27 U/L (ref 10–52)
ANION GAP SERPL CALC-SCNC: 12 MMOL/L (ref 10–20)
AST SERPL W P-5'-P-CCNC: 22 U/L (ref 9–39)
BASOPHILS # BLD AUTO: 0.05 X10*3/UL (ref 0–0.1)
BASOPHILS NFR BLD AUTO: 1.4 %
BILIRUB SERPL-MCNC: 0.5 MG/DL (ref 0–1.2)
BUN SERPL-MCNC: 12 MG/DL (ref 6–23)
CALCIUM SERPL-MCNC: 9.3 MG/DL (ref 8.6–10.3)
CHLORIDE SERPL-SCNC: 103 MMOL/L (ref 98–107)
CO2 SERPL-SCNC: 28 MMOL/L (ref 21–32)
CREAT SERPL-MCNC: 0.74 MG/DL (ref 0.5–1.3)
EGFRCR SERPLBLD CKD-EPI 2021: >90 ML/MIN/1.73M*2
EOSINOPHIL # BLD AUTO: 0.2 X10*3/UL (ref 0–0.4)
EOSINOPHIL NFR BLD AUTO: 5.6 %
ERYTHROCYTE [DISTWIDTH] IN BLOOD BY AUTOMATED COUNT: 13.5 % (ref 11.5–14.5)
GLUCOSE SERPL-MCNC: 75 MG/DL (ref 74–99)
HCT VFR BLD AUTO: 46.5 % (ref 41–52)
HGB BLD-MCNC: 15.6 G/DL (ref 13.5–17.5)
IMM GRANULOCYTES # BLD AUTO: 0.01 X10*3/UL (ref 0–0.5)
IMM GRANULOCYTES NFR BLD AUTO: 0.3 % (ref 0–0.9)
LYMPHOCYTES # BLD AUTO: 0.71 X10*3/UL (ref 0.8–3)
LYMPHOCYTES NFR BLD AUTO: 19.8 %
MCH RBC QN AUTO: 30.6 PG (ref 26–34)
MCHC RBC AUTO-ENTMCNC: 33.5 G/DL (ref 32–36)
MCV RBC AUTO: 91 FL (ref 80–100)
MONOCYTES # BLD AUTO: 0.55 X10*3/UL (ref 0.05–0.8)
MONOCYTES NFR BLD AUTO: 15.4 %
NEUTROPHILS # BLD AUTO: 2.06 X10*3/UL (ref 1.6–5.5)
NEUTROPHILS NFR BLD AUTO: 57.5 %
NRBC BLD-RTO: 0 /100 WBCS (ref 0–0)
PLATELET # BLD AUTO: 186 X10*3/UL (ref 150–450)
POTASSIUM SERPL-SCNC: 4.2 MMOL/L (ref 3.5–5.3)
PROT SERPL-MCNC: 6.8 G/DL (ref 6.4–8.2)
RBC # BLD AUTO: 5.09 X10*6/UL (ref 4.5–5.9)
SODIUM SERPL-SCNC: 139 MMOL/L (ref 136–145)
WBC # BLD AUTO: 3.6 X10*3/UL (ref 4.4–11.3)

## 2024-12-11 PROCEDURE — 85025 COMPLETE CBC W/AUTO DIFF WBC: CPT

## 2024-12-11 PROCEDURE — 99214 OFFICE O/P EST MOD 30 MIN: CPT | Performed by: STUDENT IN AN ORGANIZED HEALTH CARE EDUCATION/TRAINING PROGRAM

## 2024-12-11 PROCEDURE — 84075 ASSAY ALKALINE PHOSPHATASE: CPT

## 2024-12-11 PROCEDURE — 71046 X-RAY EXAM CHEST 2 VIEWS: CPT

## 2024-12-11 ASSESSMENT — PAIN SCALES - GENERAL: PAINLEVEL_OUTOF10: 0-NO PAIN

## 2024-12-11 NOTE — PROGRESS NOTES
"Patient ID: Rip Avalos is a 74 y.o. male.    Subjective    HPI  Presents today for sick visit in [facility], accompanied by ***. Presents with rhinorrhea, tested positive for COVID-19 on ***.        Review of Systems - Oncology    Objective    BSA: There is no height or weight on file to calculate BSA.  There were no vitals taken for this visit.  Vital signs reviewed today.      Physical Exam    Performance Status:  {Karnofsky Score:22851:::1}      Relevant Diagnostics:               No lab exists for component: \"LABALBU\"            Assessment/Plan        Oncology History Overview Note   Treatment Synopsis:    - Relapsed Mantle Cell Lymphoma, originally diagnosed  Aug 2021 with lymphadenopathy and esophageal mass, biopsy of which consistent with mantle cell lymphoma, Ki67 of 10%  - s/p Zak/Rituxan x 4 cycles, followed by maintenance Rituxan (completed Aug 2022)  - PET scan (2/6/23): multiple FDG avid nodes in mediastinum and hilar area, no LN below diaphragm  - Repeat PET scan (4/24/23): persistent thoracic LN, new intraluminal masses in transverse colon concerning for disease  - BMBx (4/6/23): negative for lymphoma  - Admitted for CART (T0= 5/16/23) prep with flu/cy on CASE 2422  - 5/16/23: CART, infused with 17.5 x10^6/Kg cells     PET CT day +30, D+6, D+90 STEFFI    4/30/24 1 year PET CT:  1. Focal hypermetabolic right hilar lymph node which could be related to inflammatory changes but is concerning for disease recurrence. No other hypermetabolic lymph nodes are visualized. Deauville 5 if  related to recurrent disease.  2. Nonspecific mild hypermetabolic activity in the distal esophagus which may represent inflammatory changes, however, disease involvement cannot be ruled out. Direct visualization can be considered as clinically indicated.  3. No evidence of hypermetabolic activity within the spleen or bone marrow to suggest lymphomatous involvement.    === 07/15/24 ===NM PET CT LYMPHOMA " STAGING    1. Interval resolution of the previously noted hypermetabolic right hilar lymph node, which was likely reactive. No hypermetabolic selvin or extranodal disease activity (Deauville score of 1).  2. Interval resolution of the previously noted mild metabolic activity in the distal esophagus.               Mantle cell lymphoma   9/19/2023 Initial Diagnosis    Mantle cell lymphoma (CMS/HCC)          No problem-specific Assessment & Plan notes found for this encounter.           PK KimC   some improvement. Pulmonary exam within normal limits, no hypoxia or fever on vitals. CXR clear without evidence of consolidations or effusions. Recommend continuing with current therapy, adding in supportive care, return precautions reviewed.        Relapsed MCL  - intially diagnosed 2021; MIPI: N/A; ECO; KPS of 70  - TP53: negative, ki-67: 10%  - s/p BR x 4 cycles, followed by maintenance ritux x 4 ( last dose 2022)  - PET scan (23): increased uptake in hilar/ mediastinal LN concerning for relapsed disease  - Bronchoscopy (23): biopsy consistent with MCL ( no Ki-67 or TP53 reported)  - BM bx (2023) STEFFI  CAR T Cell therapy: Currently  19 monthsT0=23)     Treated with clinical trial CASE 2422; preparative regimen included fludarabine 30mg/m2 & cyclophosphamide 500mg/m2 on days T-4, T-3, & T-2.  Hospital course complicated by grade 3 neurotox and grade 2 CRS with fever and confusion; infectious workup negative, CTH negative for acute process, managed w/ toci x2 doses and dexamethasone taper that was completed on . Steroid induced hallucinations  resolved at discharge.      Response Monitoring:   Day 30 response monitoring: PET scan (23) notes CR (Deauville 1)   Day 60 response monitoring: PET scan (23) notes CR (Deauville 1)   Day 90 response monitoring: PET scan (23) notes CR (Deauville 1)  Day +365 response monitoring: PET scan (24) -  some concern over hilar lymph node   DD infectious vs lymphoma - will repeat PET in 2 months.   14 months (24) PET scan has normalized. (Deauville 1) . The patient remains in remission     24 remains in clnical remission     Infectious prophylaxis:    Antiviral prophylaxis: acyclovir,  stop today 24   PCP prophylaxis: TMP-SMX DS, M/W/F;  stop today 24   Antifungal prophylaxis: fluconazole,  completed     10/2024 remains hypogammaglobulinemic - replace  CMV monitoring:  - CMV DNA PCR (with each visit): ND 23      IgG (with each visit): 577 (7/22/24),      Persistent cough  Reflux? But endoscopy negative except for barrets     10/2024 plan repeat CT chest and viral swab all negative...     CT chest    1.  No evidence of acute pathology in the chest. No evidence of focal consolidation or pneumonia.  2. Suggestion of mild subpleural reticulation within the inferior lower lobes. Correlate with concern for interstitial lung disease.  3. Multiple metallic structures within the bilateral lower lobes and inferior medial left upper lobe, unchanged compared to 2022. Findings are suggestive of aspirated or embolized metallic foreign material.  4. Severe coronary artery calcifications and prominent aortic valvular calcifications.. Additional stable chronic findings as above.     Anti-infective Vaccines:   - has received vaccines for 6 months and 9 months post CAR-T  -      Cardiac:   ECHO 4/6/23: EF 55-60%, impaired pattern of relaxation, atrial septal aneurism present   ECHO 6/15/23: EF 50-55%, large PFO seen with bubble study, atrial septal aneurism present; no clots seen    Hx HLD: continue atorvastatin  ASA 81 mg restarted at discharge        PE  Patient presented to the ED on 6/8/23 with R sided low back pain that started acutely that evening; pain worse with inspiration. CT revealed bilateral PEs. US BLE negative for DVT.  Started on heparin drip in the ED and patient was admitted for further workup and management of PE.    Initially patient was transitioned to LMWH, however, because of prescription cost he was discharged on Eliquis and will continue for 6mths.  until end of January 2024, since it's deemed provoked in nature  - Completed Eliquis Jan 2024  - instructed to avoid excessive immobility or long travel with no breaks.     Neuro/psych  Hx of vertigo, stable. Able to navigate at home; no falls.   Grade 3 neurotoxicity with onset 5/25/23  Steroid induced hallucinations, rapid dexamethasone taper (10 mg Q6 on 5/25, then Q8  on  5/27& q12 5/28, q24 5/29, then completed during hospitalization on 5/30/23) sx resolved.  6/20 all neuro symptoms long resolved.      Mood  6/2023 Lexapro.      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

## 2024-12-11 NOTE — PROGRESS NOTES
Patient arrived to infusion under covid + precautions. He c/o congestion, coughing, fatigue and weakness. His labs were drawn and he was assessed by PA.  He was taken to radiology for chest xray and discharged in stable conidtion.

## 2024-12-11 NOTE — PROGRESS NOTES
Subjective   Patient ID: Rip Avalos is a 74 y.o. male who presents for Illness.    Mr. Kady Avalos today came here for virtual visit.  Cough, congestion, shortness of breath, wheezing.  He did COVID test, positive.  He is immunocompromised from his treatment.    I have personally reviewed the patient's Past Medical History, Medications, Allergies, Social History, and Family History in the EMR.    Review of Systems   All other systems reviewed and are negative.    Objective   Virtual. There were no vitals taken for this visit.    Physical Exam  HENT:      Nose: Congestion present.      Comments: postnasal drip     Mouth/Throat:      Comments: Throat congested  Pulmonary:      Breath sounds: Wheezing present.   Musculoskeletal:      Right lower leg: No edema.      Left lower leg: No edema.     Assessment/Plan   Problem List Items Addressed This Visit             ICD-10-CM       Cardiac and Vasculature    Hypertension I10     Other Visit Diagnoses         Codes    High cholesterol     E78.00    Pre-diabetes     R73.03    COVID     U07.1    Relevant Medications    nirmatrelvir-ritonavir (Paxlovid) 300 mg (150 mg x 2)-100 mg tablet therapy pack    loratadine (Claritin) 10 mg tablet    guaiFENesin (Robitussin) 100 mg/5 mL syrup        1. COVID-19 infection, symptomatic, immunocompromised.  Paxlovid, Claritin, Robitussin, Flonase, albuterol, and Tessalon Perles.  Supportive care.  Follow-up in two weeks if not better.  Stop statin while he is on medication.  2. Continue to follow.  3. Time spent, 25 minutes, more than half in direct patient care.    Scribe Attestation  By signing my name below, IDavina Scribe attest that this documentation has been prepared under the direction and in the presence of Sun Yuen MD.   All medical record entries made by the scribe were personally dictated by me I have reviewed the chart and agree the record accurately reflects my personal performance of his history  physical examination and management

## 2024-12-24 ENCOUNTER — APPOINTMENT (OUTPATIENT)
Dept: HEMATOLOGY/ONCOLOGY | Facility: HOSPITAL | Age: 74
End: 2024-12-24
Payer: MEDICARE

## 2024-12-31 ENCOUNTER — INFUSION (OUTPATIENT)
Dept: HEMATOLOGY/ONCOLOGY | Facility: HOSPITAL | Age: 74
End: 2024-12-31
Payer: MEDICARE

## 2024-12-31 ENCOUNTER — OFFICE VISIT (OUTPATIENT)
Dept: HEMATOLOGY/ONCOLOGY | Facility: HOSPITAL | Age: 74
End: 2024-12-31
Payer: MEDICARE

## 2024-12-31 VITALS
WEIGHT: 200 LBS | DIASTOLIC BLOOD PRESSURE: 65 MMHG | HEART RATE: 73 BPM | BODY MASS INDEX: 30.41 KG/M2 | RESPIRATION RATE: 16 BRPM | TEMPERATURE: 97.9 F | SYSTOLIC BLOOD PRESSURE: 113 MMHG | OXYGEN SATURATION: 97 %

## 2024-12-31 DIAGNOSIS — C83.10 MANTLE CELL LYMPHOMA, UNSPECIFIED BODY REGION (MULTI): ICD-10-CM

## 2024-12-31 DIAGNOSIS — Z92.21 PERSONAL HISTORY OF ANTINEOPLASTIC CHEMOTHERAPY: ICD-10-CM

## 2024-12-31 DIAGNOSIS — Z92.850 HISTORY OF CHIMERIC ANTIGEN RECEPTOR T-CELL THERAPY: ICD-10-CM

## 2024-12-31 DIAGNOSIS — D80.1 HYPOGAMMAGLOBULINEMIA (MULTI): ICD-10-CM

## 2024-12-31 DIAGNOSIS — D84.9 IMMUNOCOMPROMISED STATE: ICD-10-CM

## 2024-12-31 DIAGNOSIS — C83.10 MANTLE CELL LYMPHOMA, UNSPECIFIED BODY REGION (MULTI): Primary | ICD-10-CM

## 2024-12-31 LAB
ALBUMIN SERPL BCP-MCNC: 3.8 G/DL (ref 3.4–5)
ALP SERPL-CCNC: 74 U/L (ref 33–136)
ALT SERPL W P-5'-P-CCNC: 17 U/L (ref 10–52)
ANION GAP SERPL CALC-SCNC: 11 MMOL/L (ref 10–20)
AST SERPL W P-5'-P-CCNC: 14 U/L (ref 9–39)
BASOPHILS # BLD AUTO: 0.07 X10*3/UL (ref 0–0.1)
BASOPHILS NFR BLD AUTO: 1.4 %
BILIRUB SERPL-MCNC: 0.4 MG/DL (ref 0–1.2)
BUN SERPL-MCNC: 11 MG/DL (ref 6–23)
CALCIUM SERPL-MCNC: 8.4 MG/DL (ref 8.6–10.3)
CHLORIDE SERPL-SCNC: 107 MMOL/L (ref 98–107)
CO2 SERPL-SCNC: 25 MMOL/L (ref 21–32)
CREAT SERPL-MCNC: 0.87 MG/DL (ref 0.5–1.3)
EGFRCR SERPLBLD CKD-EPI 2021: 89 ML/MIN/1.73M*2
EOSINOPHIL # BLD AUTO: 0.17 X10*3/UL (ref 0–0.4)
EOSINOPHIL NFR BLD AUTO: 3.5 %
ERYTHROCYTE [DISTWIDTH] IN BLOOD BY AUTOMATED COUNT: 13.2 % (ref 11.5–14.5)
GLUCOSE SERPL-MCNC: 84 MG/DL (ref 74–99)
HCT VFR BLD AUTO: 41.7 % (ref 41–52)
HGB BLD-MCNC: 14.3 G/DL (ref 13.5–17.5)
IGG SERPL-MCNC: 431 MG/DL (ref 700–1600)
IMM GRANULOCYTES # BLD AUTO: 0.02 X10*3/UL (ref 0–0.5)
IMM GRANULOCYTES NFR BLD AUTO: 0.4 % (ref 0–0.9)
LDH SERPL L TO P-CCNC: 120 U/L (ref 84–246)
LYMPHOCYTES # BLD AUTO: 0.52 X10*3/UL (ref 0.8–3)
LYMPHOCYTES NFR BLD AUTO: 10.7 %
MCH RBC QN AUTO: 31 PG (ref 26–34)
MCHC RBC AUTO-ENTMCNC: 34.3 G/DL (ref 32–36)
MCV RBC AUTO: 91 FL (ref 80–100)
MONOCYTES # BLD AUTO: 0.59 X10*3/UL (ref 0.05–0.8)
MONOCYTES NFR BLD AUTO: 12.2 %
NEUTROPHILS # BLD AUTO: 3.48 X10*3/UL (ref 1.6–5.5)
NEUTROPHILS NFR BLD AUTO: 71.8 %
NRBC BLD-RTO: 0 /100 WBCS (ref 0–0)
PLATELET # BLD AUTO: 159 X10*3/UL (ref 150–450)
POTASSIUM SERPL-SCNC: 4.1 MMOL/L (ref 3.5–5.3)
PROT SERPL-MCNC: 5.7 G/DL (ref 6.4–8.2)
RBC # BLD AUTO: 4.61 X10*6/UL (ref 4.5–5.9)
SODIUM SERPL-SCNC: 139 MMOL/L (ref 136–145)
WBC # BLD AUTO: 4.9 X10*3/UL (ref 4.4–11.3)

## 2024-12-31 PROCEDURE — 2500000004 HC RX 250 GENERAL PHARMACY W/ HCPCS (ALT 636 FOR OP/ED): Performed by: INTERNAL MEDICINE

## 2024-12-31 PROCEDURE — 80053 COMPREHEN METABOLIC PANEL: CPT

## 2024-12-31 PROCEDURE — 96365 THER/PROPH/DIAG IV INF INIT: CPT | Mod: INF

## 2024-12-31 PROCEDURE — 2500000001 HC RX 250 WO HCPCS SELF ADMINISTERED DRUGS (ALT 637 FOR MEDICARE OP): Performed by: INTERNAL MEDICINE

## 2024-12-31 PROCEDURE — 2500000004 HC RX 250 GENERAL PHARMACY W/ HCPCS (ALT 636 FOR OP/ED): Mod: JZ,JG,TB | Performed by: INTERNAL MEDICINE

## 2024-12-31 PROCEDURE — 82784 ASSAY IGA/IGD/IGG/IGM EACH: CPT

## 2024-12-31 PROCEDURE — 99215 OFFICE O/P EST HI 40 MIN: CPT | Performed by: INTERNAL MEDICINE

## 2024-12-31 PROCEDURE — 85025 COMPLETE CBC W/AUTO DIFF WBC: CPT

## 2024-12-31 PROCEDURE — 96366 THER/PROPH/DIAG IV INF ADDON: CPT | Mod: INF

## 2024-12-31 PROCEDURE — 83615 LACTATE (LD) (LDH) ENZYME: CPT

## 2024-12-31 RX ORDER — EPINEPHRINE 0.3 MG/.3ML
0.3 INJECTION SUBCUTANEOUS EVERY 5 MIN PRN
OUTPATIENT
Start: 2025-01-21

## 2024-12-31 RX ORDER — ACETAMINOPHEN 325 MG/1
650 TABLET ORAL ONCE
OUTPATIENT
Start: 2025-01-21

## 2024-12-31 RX ORDER — ACETAMINOPHEN 325 MG/1
650 TABLET ORAL ONCE
Status: COMPLETED | OUTPATIENT
Start: 2024-12-31 | End: 2024-12-31

## 2024-12-31 RX ORDER — FAMOTIDINE 10 MG/ML
20 INJECTION INTRAVENOUS ONCE AS NEEDED
OUTPATIENT
Start: 2025-01-21

## 2024-12-31 RX ORDER — ALBUTEROL SULFATE 0.83 MG/ML
3 SOLUTION RESPIRATORY (INHALATION) AS NEEDED
OUTPATIENT
Start: 2025-01-21

## 2024-12-31 RX ORDER — HEPARIN SODIUM,PORCINE/PF 10 UNIT/ML
50 SYRINGE (ML) INTRAVENOUS AS NEEDED
OUTPATIENT
Start: 2024-12-31

## 2024-12-31 RX ORDER — DIPHENHYDRAMINE HCL 25 MG
25 CAPSULE ORAL ONCE
Status: COMPLETED | OUTPATIENT
Start: 2024-12-31 | End: 2024-12-31

## 2024-12-31 RX ORDER — HEPARIN 100 UNIT/ML
500 SYRINGE INTRAVENOUS AS NEEDED
OUTPATIENT
Start: 2024-12-31

## 2024-12-31 RX ORDER — DIPHENHYDRAMINE HCL 25 MG
25 CAPSULE ORAL ONCE
OUTPATIENT
Start: 2025-01-21

## 2024-12-31 RX ORDER — DIPHENHYDRAMINE HYDROCHLORIDE 50 MG/ML
50 INJECTION INTRAMUSCULAR; INTRAVENOUS AS NEEDED
OUTPATIENT
Start: 2025-01-21

## 2024-12-31 RX ADMIN — ACETAMINOPHEN 650 MG: 325 TABLET ORAL at 15:25

## 2024-12-31 RX ADMIN — IMMUNE GLOBULIN INFUSION (HUMAN) 35 G: 100 INJECTION, SOLUTION INTRAVENOUS; SUBCUTANEOUS at 15:53

## 2024-12-31 RX ADMIN — DIPHENHYDRAMINE HYDROCHLORIDE 25 MG: 25 CAPSULE ORAL at 15:25

## 2024-12-31 RX ADMIN — METHYLPREDNISOLONE SODIUM SUCCINATE 20 MG: 40 INJECTION, POWDER, FOR SOLUTION INTRAMUSCULAR; INTRAVENOUS at 15:25

## 2024-12-31 ASSESSMENT — PAIN SCALES - GENERAL
PAINLEVEL_OUTOF10: 0-NO PAIN
PAINLEVEL_OUTOF10: 0 - NO PAIN

## 2024-12-31 NOTE — PROGRESS NOTES
PT presents to infusion post MD visit; see provider note for assessment. PT reports no new s/s prior to treatment. Infusion administered without incident. PT received AVS prior to discharge.

## 2024-12-31 NOTE — PROGRESS NOTES
HEMATOLOGY/ONCOLOGY CLINIC NOTE    HPI:    Had recent Covid treated with paxlovid.  Now resolved.  Feeling good.  Chronic cough is resolving    Oncology History and Treatment synopsis  Oncology History Overview Note   Treatment Synopsis:    - Relapsed Mantle Cell Lymphoma, originally diagnosed  Aug 2021 with lymphadenopathy and esophageal mass, biopsy of which consistent with mantle cell lymphoma, Ki67 of 10%  - s/p Zak/Rituxan x 4 cycles, followed by maintenance Rituxan (completed Aug 2022)  - PET scan (2/6/23): multiple FDG avid nodes in mediastinum and hilar area, no LN below diaphragm  - Repeat PET scan (4/24/23): persistent thoracic LN, new intraluminal masses in transverse colon concerning for disease  - BMBx (4/6/23): negative for lymphoma  - Admitted for CART (T0= 5/16/23) prep with flu/cy on CASE 2422  - 5/16/23: CART, infused with 17.5 x10^6/Kg cells     PET CT day +30, D+6, D+90 STEFFI    4/30/24 1 year PET CT:  1. Focal hypermetabolic right hilar lymph node which could be related to inflammatory changes but is concerning for disease recurrence. No other hypermetabolic lymph nodes are visualized. Deauville 5 if  related to recurrent disease.  2. Nonspecific mild hypermetabolic activity in the distal esophagus which may represent inflammatory changes, however, disease involvement cannot be ruled out. Direct visualization can be considered as clinically indicated.  3. No evidence of hypermetabolic activity within the spleen or bone marrow to suggest lymphomatous involvement.    === 07/15/24 ===NM PET CT LYMPHOMA STAGING    1. Interval resolution of the previously noted hypermetabolic right hilar lymph node, which was likely reactive. No hypermetabolic selvin or extranodal disease activity (Deauville score of 1).  2. Interval resolution of the previously noted mild metabolic activity in the distal esophagus.               Mantle cell lymphoma   9/19/2023 Initial Diagnosis    Mantle cell lymphoma (CMS/HCC)          PMH:  Past Medical History:   Diagnosis Date    Acute pharyngitis, unspecified 01/31/2018    Acute pharyngitis    Acute pulmonary embolism with acute cor pulmonale, unspecified pulmonary embolism type (Multi)     Acute sinusitis, unspecified 01/31/2018    Acute sinusitis    Arthritis     BPH (benign prostatic hyperplasia)     Calculus of kidney 01/31/2018    Kidney stone on right side    Chest pain, unspecified 01/31/2018    Chest pain    Cystoid macular degeneration, right eye 01/31/2018    Cystoid macular edema of right eye    Dizziness and giddiness 01/31/2018    Dizziness    Gastro-esophageal reflux disease without esophagitis 01/31/2018    Esophageal reflux    High cholesterol     Mantle cell lymphoma     Migraine, unspecified, not intractable, without status migrainosus 01/31/2018    Migraine headache    Other fatigue 01/31/2018    Fatigue    Personal history of malignant neoplasm, unspecified     History of malignant neoplasm    Personal history of other diseases of the circulatory system 04/15/2013    History of hypertension    Pure hypercholesterolemia, unspecified 09/24/2013    Low-density-lipoid-type (LDL) hyperlipoproteinemia    Pure hypercholesterolemia, unspecified 01/31/2018    Hypercholesterolemia    Rheumatoid arthritis, unspecified 01/31/2018    Rheumatoid arthritis    Stem cells transplant status (Multi)     Thrombocytopenia, unspecified (CMS-HCC) 01/31/2018    Thrombocytopenia    Unspecified osteoarthritis, unspecified site 01/31/2018    Osteoarthritis    Vitamin deficiency, unspecified 01/31/2018    Vitamin deficiency       PSH:  Past Surgical History:   Procedure Laterality Date    CT ABDOMEN PELVIS ANGIOGRAM W AND/OR WO IV CONTRAST  6/9/2023    CT ABDOMEN PELVIS ANGIOGRAM W AND/OR WO IV [place]AST [place] [facility] [place] [place]GERY  09/26/2016    Hand Surgery                                                                                                                                                           IR CVC TUNNELED  5/1/2023    IR CVC TUNNELED 5/1/2023 [facility]GIO    KIDNEY SURGERY  09/26/2016    Kidney Surgery    MR HEAD ANGIO WO IV CONTRAST  9/4/2013    MR HEAD ANGIO WO IV CONTRAST [facility]    OTHER SURGICAL HISTORY  12/07/2022    Lymph node surgery    OTHER SURGICAL HISTORY  09/26/2016    Scler Buckle Repair Sufficient Fluid Drained, Retina Inspect    OTHER SURGICAL HISTORY  09/26/2016    Root Canal       SH:  Social History     Tobacco Use    Smoking status: Never    Smokeless tobacco: Never   Substance Use Topics    Alcohol use: Never      has no history on file for drug use.      CURRENT MEDS:  Current Outpatient Medications on File Prior to Visit   Medication Sig Dispense Refill    albuterol (Proventil HFA) 90 mcg/actuation inhaler Inhale 2 puffs every 4 hours if needed for wheezing or shortness of breath. 6.7 g 0    atorvastatin (Lipitor) 40 mg tablet TAKE 1/2 TABLET ONE TIME DAILY 45 tablet 0    budesonide-formoteroL (Symbicort) 80-4.5 mcg/actuation inhaler Inhale 2 puffs 2 times a day. Rinse mouth with water after use to reduce aftertaste and incidence of candidiasis. Do not swallow. 10.2 g 5    guaiFENesin (Robitussin) 100 mg/5 mL syrup Take 20 mL by mouth every 4 hours if needed for cough. 120 mL 0    loratadine (Claritin) 10 mg tablet Take 1 tablet (10 mg) by mouth once daily. 30 tablet 2    multivitamin tablet Take 1 tablet by mouth once daily.      omeprazole (PriLOSEC) 40 mg DR capsule Take 1 capsule (40 mg) by mouth 2 times a day before meals. 180 capsule 0    sod picosulf-mag ox-citric ac (Clenpiq) 10 mg-3.5 gram- 12 gram/175 mL solution Take one bottle twice as directed by the prep instructions 350 mL 0    traZODone (Desyrel) 50 mg tablet Take 1 tablet (50 mg) by mouth as needed at bedtime for sleep. 30 tablet 0     No current facility-administered medications on file prior to visit.       PHYSICAL EXAM:  /65 (BP Location: Left arm, Patient Position: Sitting,  "BP Cuff Size: Large adult)   Pulse 73   Temp 36.6 °C (97.9 °F) (Skin)   Resp 16   Wt 90.7 kg (200 lb)   SpO2 97%   BMI 30.41 kg/m²  Reviewed in chart    ECOG score 0    Physical Exam  Vitals reviewed.   Constitutional:       Appearance: Normal appearance.   HENT:      Head: Normocephalic.      Mouth/Throat:      Mouth: Mucous membranes are moist.      Pharynx: Oropharynx is clear.   Eyes:      Pupils: Pupils are equal, round, and reactive to light.      Comments: Eyepatch left   Cardiovascular:      Rate and Rhythm: Normal rate and regular rhythm.   Pulmonary:      Effort: Pulmonary effort is normal. No respiratory distress.      Breath sounds: Normal breath sounds. No wheezing or rales.   Abdominal:      General: Abdomen is flat. Bowel sounds are normal. There is no distension.      Palpations: Abdomen is soft. There is no mass.   Musculoskeletal:         General: No swelling. Normal range of motion.   Skin:     General: Skin is warm.      Findings: No erythema or rash.   Neurological:      General: No focal deficit present.      Mental Status: He is alert and oriented to person, place, and time.   Psychiatric:         Mood and Affect: Mood normal.         Behavior: Behavior normal.         Thought Content: Thought content normal.     LAB DATA:    No components found for: \"CBC\"  Lab Results   Component Value Date    WBC 3.6 (L) 12/11/2024    WBC 6.8 10/22/2024    WBC 5.0 07/23/2024    WBC 5.1 05/14/2024    WBC 5.4 05/13/2024     Lab Results   Component Value Date    HGB 15.6 12/11/2024    HGB 14.5 10/22/2024    HGB 14.4 07/23/2024    HGB 14.8 05/14/2024    HGB 14.6 05/13/2024     Lab Results   Component Value Date     12/11/2024     10/22/2024     07/23/2024     05/14/2024     05/13/2024       Lab Results   Component Value Date    GLUCOSE 75 12/11/2024    CALCIUM 9.3 12/11/2024     12/11/2024    K 4.2 12/11/2024    CO2 28 12/11/2024     12/11/2024    BUN 12 " 2024    CREATININE 0.74 2024     Lab Results   Component Value Date    CREATININE 0.74 2024    CREATININE 0.75 10/22/2024    CREATININE 0.86 2024    CREATININE 0.81 2024    CREATININE 0.79 2024       Lab Results   Component Value Date    ALT 27 2024    AST 22 2024    ALKPHOS 82 2024    BILITOT 0.5 2024       [facility]  Lab Results   Component Value Date     (L) 10/22/2024     (L) 2024     (L) 2024     2023     2023       ASSESSMENT AND PLAN  Mr. Avalos is a 71 y/o M w/ PMH of severe vertigo, HLD, GERD/ Ceja's esophagus, prostate cancer () s/p brachytherapy, MCL ( dx: 2021) s/p BR x 4 followed  by maintenance ritux, unfortunately found to have evidence of relapsed disease on PET scan (23) with biopsy confirmation (23) who was referred for treatment of recurrent MCL. S/p CAR-T, coming today for 1 year post CAR T visit.     Concerns today - chronic cough, dry, congestion, no constitutional symptoms.   Will get chest CT with contrast for further evaluation.      Relapsed MCL  - intially diagnosed 2021; MIPI: N/A; ECO; KPS of 70  - TP53: negative, ki-67: 10%  - s/p BR x 4 cycles, followed by maintenance ritux x 4 ( last dose 2022)  - PET scan (23): increased uptake in hilar/ mediastinal LN concerning for relapsed disease  - Bronchoscopy (23): biopsy consistent with MCL ( no Ki-67 or TP53 reported)  - BM bx (2023) STEFFI  CAR T Cell therapy: Currently  19 monthsT0=23)     Treated with clinical trial CASE 2422; preparative regimen included fludarabine 30mg/m2 & cyclophosphamide 500mg/m2 on days T-4, T-3, & T-2.  Hospital course complicated by grade 3 neurotox and grade 2 CRS with fever and confusion; infectious workup negative, CTH negative for acute process, managed w/ toci x2 doses and dexamethasone taper that was completed on . Steroid induced  hallucinations  resolved at discharge.      Response Monitoring:   Day 30 response monitoring: PET scan (6/13/23) notes CR (Deauville 1)   Day 60 response monitoring: PET scan (7/11/23) notes CR (Deauville 1)   Day 90 response monitoring: PET scan (8/17/23) notes CR (Deauville 1)  Day +365 response monitoring: PET scan (5/14/24) -  some concern over hilar lymph node   DD infectious vs lymphoma - will repeat PET in 2 months.   14 months (7/22/24) PET scan has normalized. (Deauville 1) . The patient remains in remission    12/31/24 remains in clnical remission     Infectious prophylaxis:    Antiviral prophylaxis: acyclovir,  stop today 5/14/24   PCP prophylaxis: TMP-SMX DS, M/W/F;  stop today 5/14/24   Antifungal prophylaxis: fluconazole,  completed     10/2024 remains hypogammaglobulinemic - replace  CMV monitoring:  - CMV DNA PCR (with each visit): ND 12/7/23     IgG (with each visit): 577 (7/22/24),     Persistent cough  Reflux? But endoscopy negative except for barrets    10/2024 plan repeat CT chest and viral swab all negative...    CT chest    1.  No evidence of acute pathology in the chest. No evidence of focal consolidation or pneumonia.  2. Suggestion of mild subpleural reticulation within the inferior lower lobes. Correlate with concern for interstitial lung disease.  3. Multiple metallic structures within the bilateral lower lobes and inferior medial left upper lobe, unchanged compared to 2022. Findings are suggestive of aspirated or embolized metallic foreign material.  4. Severe coronary artery calcifications and prominent aortic valvular calcifications.. Additional stable chronic findings as above.     Anti-infective Vaccines:   - has received vaccines for 6 months and 9 months post CAR-T  -      Cardiac:   ECHO 4/6/23: EF 55-60%, impaired pattern of relaxation, atrial septal aneurism present   ECHO 6/15/23: EF 50-55%, large PFO seen with bubble study, atrial septal aneurism present; no clots seen     Hx  HLD: continue atorvastatin  ASA 81 mg restarted at discharge         PE  Patient presented to the ED on 6/8/23 with R sided low back pain that started acutely that evening; pain worse with inspiration. CT revealed bilateral PEs. US BLE negative for DVT.  Started on heparin drip in the ED and patient was admitted for further workup and management of PE.    Initially patient was transitioned to LMWH, however, because of prescription cost he was discharged on Eliquis and will continue for 6mths.  until end of January 2024, since it's deemed provoked in nature  - Completed Eliquis Jan 2024  - instructed to avoid excessive immobility or long travel with no breaks.     Neuro/psych  Hx of vertigo, stable. Able to navigate at home; no falls.   Grade 3 neurotoxicity with onset 5/25/23  Steroid induced hallucinations, rapid dexamethasone taper (10 mg Q6 on 5/25, then Q8 on  5/27& q12 5/28, q24 5/29, then completed during hospitalization on 5/30/23) sx resolved.  6/20 all neuro symptoms long resolved.      Mood  6/2023 Lexapro.      Ophthalmic  Hx of L eye corneal abrasion, occular hypertension  s/p surgical intervention w/ scleral buckling (1993), photocoagulation (1993)  Legally blind in L eye, now managed w/ eye patch      COVID 11/24 resolved.

## 2025-01-03 DIAGNOSIS — E78.00 PURE HYPERCHOLESTEROLEMIA: ICD-10-CM

## 2025-01-03 DIAGNOSIS — K21.00 GASTROESOPHAGEAL REFLUX DISEASE WITH ESOPHAGITIS WITHOUT HEMORRHAGE: ICD-10-CM

## 2025-01-03 RX ORDER — ATORVASTATIN CALCIUM 40 MG/1
20 TABLET, FILM COATED ORAL DAILY
Qty: 45 TABLET | Refills: 0 | Status: SHIPPED | OUTPATIENT
Start: 2025-01-03

## 2025-01-03 RX ORDER — OMEPRAZOLE 40 MG/1
40 CAPSULE, DELAYED RELEASE ORAL
Qty: 180 CAPSULE | Refills: 0 | Status: SHIPPED | OUTPATIENT
Start: 2025-01-03

## 2025-02-10 ASSESSMENT — ENCOUNTER SYMPTOMS
CARDIOVASCULAR NEGATIVE: 1
PSYCHIATRIC NEGATIVE: 1
NEUROLOGICAL NEGATIVE: 1
MUSCULOSKELETAL NEGATIVE: 1
ENDOCRINE NEGATIVE: 1
GASTROINTESTINAL NEGATIVE: 1
EYES NEGATIVE: 1
HEMATOLOGIC/LYMPHATIC NEGATIVE: 1

## 2025-02-10 NOTE — PROGRESS NOTES
Patient ID:  Rip Avalos is a 74 y.o. male.  Referring Physician:   Deb Daley MD PhD  [id number] [place] [address]  Primary Care Provider:  Sun Yuen MD    Assessment/Plan    2/11/25 CASE 2422 CART on 5/16/23. No signs/sx of recurrence. IVIG today. RTC 5/13/25 (2yr s/p CART)    Oncology History Overview Note   Treatment Synopsis:    - Relapsed Mantle Cell Lymphoma, originally diagnosed  Aug 2021 with lymphadenopathy and esophageal mass, biopsy of which consistent with mantle cell lymphoma, Ki67 of 10%  - s/p Zak/Rituxan x 4 cycles, followed by maintenance Rituxan (completed Aug 2022)  - PET scan (2/6/23): multiple FDG avid nodes in mediastinum and hilar area, no LN below diaphragm  - Repeat PET scan (4/24/23): persistent thoracic LN, new intraluminal masses in transverse colon concerning for disease  - BMBx (4/6/23): negative for lymphoma  - Admitted for CART (T0= 5/16/23) prep with flu/cy on CASE 2422  - 5/16/23: CART, infused with 17.5 x10^6/Kg cells     PET CT day +30, D+6, D+90 STEFFI    4/30/24 1 year PET CT:  1. Focal hypermetabolic right hilar lymph node which could be related to inflammatory changes but is concerning for disease recurrence. No other hypermetabolic lymph nodes are visualized. Deauville 5 if  related to recurrent disease.  2. Nonspecific mild hypermetabolic activity in the distal esophagus which may represent inflammatory changes, however, disease involvement cannot be ruled out. Direct visualization can be considered as clinically indicated.  3. No evidence of hypermetabolic activity within the spleen or bone marrow to suggest lymphomatous involvement.    === 07/15/24 ===NM PET CT LYMPHOMA STAGING    1. Interval resolution of the previously noted hypermetabolic right hilar lymph node, which was likely reactive. No hypermetabolic selvin or extranodal disease activity (Deauville score of 1).  2. Interval resolution of the previously noted mild metabolic activity in  the distal esophagus.               Mantle cell lymphoma   2023 Initial Diagnosis    Mantle cell lymphoma (CMS/HCC)        Mr. Avalos is a 75 y/o M w/ PMH of severe vertigo, HLD, GERD/ Ceja's esophagus, prostate cancer (2016) s/p brachytherapy, MCL ( dx: 2021) s/p BR x 4 followed  by maintenance ritux, unfortunately found to have evidence of relapsed disease on PET scan (23) with biopsy confirmation (23) who was referred for treatment of recurrent MCL. S/p CAR-T,      Relapsed MCL  - Intially diagnosed 2021; MIPI: N/A; ECO; KPS of 70  - TP53: negative, ki-67: 10%  - s/p BR x 4 cycles, followed by maintenance ritux x 4 ( last dose 2022)  - PET scan (23): increased uptake in hilar/ mediastinal LN concerning for relapsed disease  - Bronchoscopy (23): biopsy consistent with MCL ( no Ki-67 or TP53 reported)  - BM bx (2023) STEFFI     Treated with clinical trial CASE 2422; preparative regimen included fludarabine 30mg/m2 & cyclophosphamide 500mg/m2 on days T-4, T-3, & T-2. Hospital course complicated by grade 3 neurotox and grade 2 CRS with fever and confusion; infectious workup negative, CTH negative for acute process, managed w/ toci x2 doses and dexamethasone taper that was completed on . Steroid induced hallucinations  resolved at discharge.      Response Monitoring:   Day 30 response monitoring: PET scan (23) notes CR (Deauville 1)   Day 60 response monitoring: PET scan (23) notes CR (Deauville 1)   Day 90 response monitoring: PET scan (23) notes CR (Deauville 1)  Day +365 response monitoring: PET scan (24) -  some concern over hilar lymph node   DD infectious vs lymphoma - will repeat PET in 2 months.   14 months (24) PET scan has normalized. (Deauville 1) . The patient remains in remission    Infectious prophylaxis:    Antiviral prophylaxis: acyclovir,  stop 24   PCP prophylaxis: TMP-SMX DS, M/W/F;  stop 24   Antifungal prophylaxis:  fluconazole,  completed     Viral monitoring  10/24/24 CMV ND     Hypogammaglobulinemia/frequent infections  IVIG 12/31/24 and 2/11/25 2/11/25 IgG 615     10/29/24  CT chest    1.  No evidence of acute pathology in the chest. No evidence of focal consolidation or pneumonia.  2. Suggestion of mild subpleural reticulation within the inferior lower lobes. Correlate with concern for interstitial lung disease.  3. Multiple metallic structures within the bilateral lower lobes and inferior medial left upper lobe, unchanged compared to 2022. Findings are suggestive of aspirated or embolized metallic foreign material.  4. Severe coronary artery calcifications and prominent aortic valvular calcifications.. Additional stable chronic findings as above.     Anti-infective Vaccines:   12/17/23 RSV  10/8/24 Flu  10/8/24 Most recent Covid infestion    Vaccine Series 1  12/7/23  2/8/24  4/4/24  10/22/24    Vaccine Series 2  Wait at least 8 months from last IVIG and 2 years post CAR T-cell until he is eligible for live vaccines.   Once meets that criteria, can add in the MMR vaccines into the plan.     Cardiac:   ECHO 4/6/23: EF 55-60%, impaired pattern of relaxation, atrial septal aneurism present   ECHO 6/15/23: EF 50-55%, large PFO seen with bubble study, atrial septal aneurism present; no clots seen     Hx HLD: continue atorvastatin  ASA 81 mg restarted at discharge      PE  Patient presented to the ED on 6/8/23 with R sided low back pain that started acutely that evening; pain worse with inspiration. CT revealed bilateral PEs. US BLE negative for DVT.  Started on heparin drip in the ED and patient was admitted for further workup and management of PE.   IInitially patient was transitioned to LMWH, however, because of prescription cost he was discharged on Eliquis and will continue for 6mths.  until end of January 2024, since it's deemed provoked in nature  Completed Eliquis Jan 2024     Neuro/psych  Hx of vertigo, stable. Able  to navigate at home; no falls.   Grade 3 neurotoxicity with onset 5/25/23  Steroid induced hallucinations, rapid dexamethasone taper (10 mg Q6 on 5/25, then Q8 on  5/27& q12 5/28, q24 5/29, then completed during hospitalization on 5/30/23) sx resolved.  6/20 all neuro symptoms long resolved.      Mood  6/2023 Lexapro.      Ophthalmic  Hx of L eye corneal abrasion, occular hypertension  s/p surgical intervention w/ scleral buckling (1993), photocoagulation (1993)  Legally blind in L eye, now managed w/ eye patch     Health maintenance  8/29/24 EGD and colonoscopy      Subjective    History of Present Illness:  Mr Avalos presents to the clinic today with his wife.    No new complaints.    Next cruise in March 8. [place]!    Energy level 4-5/10.    Appetite 70%. Weight stable.     No drenching night sweats.     Still cough, varies. Abler to sleep in bed now.     Recovered from Covid. Did not tolerate Paxlovid well.           Review of Systems   Constitutional:  Positive for fatigue.   HENT:  Negative.     Eyes: Negative.    Respiratory:  Positive for cough.         Chronic congestion and sinus drainage., Mucinex helps the most.   Cardiovascular: Negative.    Gastrointestinal: Negative.    Endocrine: Negative.    Genitourinary: Negative.     Musculoskeletal: Negative.         Hands   Skin: Negative.    Neurological: Negative.         Vertigo   Hematological: Negative.    Psychiatric/Behavioral: Negative.              Objective        Physical Exam  Vitals reviewed.   Constitutional:       Appearance: Normal appearance.      Comments: Well appearing.   HENT:      Head: Normocephalic and atraumatic.      Nose: Nose normal.      Mouth/Throat:      Mouth: Mucous membranes are moist.   Eyes:      Pupils: Pupils are equal, round, and reactive to light.      Comments: Eye patch.    Cardiovascular:      Rate and Rhythm: Normal rate and regular rhythm.      Pulses: Normal pulses.      Heart sounds: Normal heart sounds.    Pulmonary:      Effort: Pulmonary effort is normal.      Breath sounds: Normal breath sounds.   Abdominal:      General: Abdomen is flat. Bowel sounds are normal.      Palpations: Abdomen is soft.   Musculoskeletal:         General: Normal range of motion.      Cervical back: Normal range of motion.   Skin:     General: Skin is warm and dry.   Neurological:      General: No focal deficit present.      Mental Status: He is alert and oriented to person, place, and time.   Psychiatric:         Mood and Affect: Mood normal.

## 2025-02-11 ENCOUNTER — LAB (OUTPATIENT)
Dept: LAB | Facility: HOSPITAL | Age: 75
End: 2025-02-11
Payer: MEDICARE

## 2025-02-11 ENCOUNTER — OFFICE VISIT (OUTPATIENT)
Dept: HEMATOLOGY/ONCOLOGY | Facility: HOSPITAL | Age: 75
End: 2025-02-11
Payer: MEDICARE

## 2025-02-11 ENCOUNTER — INFUSION (OUTPATIENT)
Dept: HEMATOLOGY/ONCOLOGY | Facility: HOSPITAL | Age: 75
End: 2025-02-11
Payer: MEDICARE

## 2025-02-11 VITALS
HEART RATE: 80 BPM | TEMPERATURE: 98.1 F | SYSTOLIC BLOOD PRESSURE: 134 MMHG | BODY MASS INDEX: 30.4 KG/M2 | WEIGHT: 199.96 LBS | DIASTOLIC BLOOD PRESSURE: 64 MMHG | RESPIRATION RATE: 18 BRPM | OXYGEN SATURATION: 98 %

## 2025-02-11 DIAGNOSIS — Z92.21 PERSONAL HISTORY OF ANTINEOPLASTIC CHEMOTHERAPY: ICD-10-CM

## 2025-02-11 DIAGNOSIS — D84.9 IMMUNOCOMPROMISED STATE: ICD-10-CM

## 2025-02-11 DIAGNOSIS — C83.10 MANTLE CELL LYMPHOMA, UNSPECIFIED BODY REGION (MULTI): Primary | ICD-10-CM

## 2025-02-11 DIAGNOSIS — C83.10 MANTLE CELL LYMPHOMA, UNSPECIFIED BODY REGION (MULTI): ICD-10-CM

## 2025-02-11 DIAGNOSIS — D80.1 HYPOGAMMAGLOBULINEMIA (MULTI): ICD-10-CM

## 2025-02-11 DIAGNOSIS — Z92.850 HISTORY OF CHIMERIC ANTIGEN RECEPTOR T-CELL THERAPY: ICD-10-CM

## 2025-02-11 LAB
ALBUMIN SERPL BCP-MCNC: 4 G/DL (ref 3.4–5)
ALP SERPL-CCNC: 88 U/L (ref 33–136)
ALT SERPL W P-5'-P-CCNC: 28 U/L (ref 10–52)
ANION GAP SERPL CALC-SCNC: 13 MMOL/L (ref 10–20)
AST SERPL W P-5'-P-CCNC: 18 U/L (ref 9–39)
BASOPHILS # BLD AUTO: 0.07 X10*3/UL (ref 0–0.1)
BASOPHILS NFR BLD AUTO: 1.4 %
BILIRUB SERPL-MCNC: 0.4 MG/DL (ref 0–1.2)
BUN SERPL-MCNC: 14 MG/DL (ref 6–23)
CALCIUM SERPL-MCNC: 9.1 MG/DL (ref 8.6–10.3)
CHLORIDE SERPL-SCNC: 104 MMOL/L (ref 98–107)
CO2 SERPL-SCNC: 26 MMOL/L (ref 21–32)
CREAT SERPL-MCNC: 0.98 MG/DL (ref 0.5–1.3)
EGFRCR SERPLBLD CKD-EPI 2021: 80 ML/MIN/1.73M*2
EOSINOPHIL # BLD AUTO: 0.15 X10*3/UL (ref 0–0.4)
EOSINOPHIL NFR BLD AUTO: 3 %
ERYTHROCYTE [DISTWIDTH] IN BLOOD BY AUTOMATED COUNT: 13.6 % (ref 11.5–14.5)
GLUCOSE SERPL-MCNC: 85 MG/DL (ref 74–99)
HCT VFR BLD AUTO: 44.9 % (ref 41–52)
HGB BLD-MCNC: 15.3 G/DL (ref 13.5–17.5)
IGG SERPL-MCNC: 615 MG/DL (ref 700–1600)
IMM GRANULOCYTES # BLD AUTO: 0.01 X10*3/UL (ref 0–0.5)
IMM GRANULOCYTES NFR BLD AUTO: 0.2 % (ref 0–0.9)
LDH SERPL L TO P-CCNC: 129 U/L (ref 84–246)
LYMPHOCYTES # BLD AUTO: 0.59 X10*3/UL (ref 0.8–3)
LYMPHOCYTES NFR BLD AUTO: 11.6 %
MCH RBC QN AUTO: 31.5 PG (ref 26–34)
MCHC RBC AUTO-ENTMCNC: 34.1 G/DL (ref 32–36)
MCV RBC AUTO: 92 FL (ref 80–100)
MONOCYTES # BLD AUTO: 0.59 X10*3/UL (ref 0.05–0.8)
MONOCYTES NFR BLD AUTO: 11.6 %
NEUTROPHILS # BLD AUTO: 3.66 X10*3/UL (ref 1.6–5.5)
NEUTROPHILS NFR BLD AUTO: 72.2 %
NRBC BLD-RTO: 0 /100 WBCS (ref 0–0)
PLATELET # BLD AUTO: 170 X10*3/UL (ref 150–450)
POTASSIUM SERPL-SCNC: 4.5 MMOL/L (ref 3.5–5.3)
PROT SERPL-MCNC: 6.5 G/DL (ref 6.4–8.2)
RBC # BLD AUTO: 4.86 X10*6/UL (ref 4.5–5.9)
SODIUM SERPL-SCNC: 138 MMOL/L (ref 136–145)
WBC # BLD AUTO: 5.1 X10*3/UL (ref 4.4–11.3)

## 2025-02-11 PROCEDURE — 96365 THER/PROPH/DIAG IV INF INIT: CPT | Mod: INF

## 2025-02-11 PROCEDURE — 2500000004 HC RX 250 GENERAL PHARMACY W/ HCPCS (ALT 636 FOR OP/ED): Mod: JZ,TB | Performed by: INTERNAL MEDICINE

## 2025-02-11 PROCEDURE — 99214 OFFICE O/P EST MOD 30 MIN: CPT | Performed by: NURSE PRACTITIONER

## 2025-02-11 PROCEDURE — 36415 COLL VENOUS BLD VENIPUNCTURE: CPT

## 2025-02-11 PROCEDURE — 2500000004 HC RX 250 GENERAL PHARMACY W/ HCPCS (ALT 636 FOR OP/ED): Mod: TB | Performed by: INTERNAL MEDICINE

## 2025-02-11 PROCEDURE — 85025 COMPLETE CBC W/AUTO DIFF WBC: CPT

## 2025-02-11 PROCEDURE — 83615 LACTATE (LD) (LDH) ENZYME: CPT

## 2025-02-11 PROCEDURE — 2500000001 HC RX 250 WO HCPCS SELF ADMINISTERED DRUGS (ALT 637 FOR MEDICARE OP): Performed by: INTERNAL MEDICINE

## 2025-02-11 PROCEDURE — 96366 THER/PROPH/DIAG IV INF ADDON: CPT | Mod: INF

## 2025-02-11 PROCEDURE — 82784 ASSAY IGA/IGD/IGG/IGM EACH: CPT

## 2025-02-11 PROCEDURE — 96374 THER/PROPH/DIAG INJ IV PUSH: CPT | Mod: 59

## 2025-02-11 PROCEDURE — 80053 COMPREHEN METABOLIC PANEL: CPT

## 2025-02-11 RX ORDER — ACETAMINOPHEN 325 MG/1
650 TABLET ORAL ONCE
Status: COMPLETED | OUTPATIENT
Start: 2025-02-11 | End: 2025-02-11

## 2025-02-11 RX ORDER — EPINEPHRINE 0.3 MG/.3ML
0.3 INJECTION SUBCUTANEOUS EVERY 5 MIN PRN
OUTPATIENT
Start: 2025-02-18

## 2025-02-11 RX ORDER — EPINEPHRINE 0.3 MG/.3ML
0.3 INJECTION SUBCUTANEOUS EVERY 5 MIN PRN
Status: DISCONTINUED | OUTPATIENT
Start: 2025-02-11 | End: 2025-02-11 | Stop reason: HOSPADM

## 2025-02-11 RX ORDER — DIPHENHYDRAMINE HCL 25 MG
25 CAPSULE ORAL ONCE
OUTPATIENT
Start: 2025-02-18

## 2025-02-11 RX ORDER — DIPHENHYDRAMINE HYDROCHLORIDE 50 MG/ML
50 INJECTION INTRAMUSCULAR; INTRAVENOUS AS NEEDED
Status: DISCONTINUED | OUTPATIENT
Start: 2025-02-11 | End: 2025-02-11 | Stop reason: HOSPADM

## 2025-02-11 RX ORDER — DIPHENHYDRAMINE HCL 25 MG
25 CAPSULE ORAL ONCE
Status: COMPLETED | OUTPATIENT
Start: 2025-02-11 | End: 2025-02-11

## 2025-02-11 RX ORDER — FAMOTIDINE 10 MG/ML
20 INJECTION INTRAVENOUS ONCE AS NEEDED
OUTPATIENT
Start: 2025-02-18

## 2025-02-11 RX ORDER — DIPHENHYDRAMINE HYDROCHLORIDE 50 MG/ML
50 INJECTION INTRAMUSCULAR; INTRAVENOUS AS NEEDED
OUTPATIENT
Start: 2025-02-18

## 2025-02-11 RX ORDER — FAMOTIDINE 10 MG/ML
20 INJECTION INTRAVENOUS ONCE AS NEEDED
Status: DISCONTINUED | OUTPATIENT
Start: 2025-02-11 | End: 2025-02-11 | Stop reason: HOSPADM

## 2025-02-11 RX ORDER — ACETAMINOPHEN 325 MG/1
650 TABLET ORAL ONCE
OUTPATIENT
Start: 2025-02-18

## 2025-02-11 RX ORDER — ALBUTEROL SULFATE 0.83 MG/ML
3 SOLUTION RESPIRATORY (INHALATION) AS NEEDED
OUTPATIENT
Start: 2025-02-18

## 2025-02-11 RX ORDER — ALBUTEROL SULFATE 0.83 MG/ML
3 SOLUTION RESPIRATORY (INHALATION) AS NEEDED
Status: DISCONTINUED | OUTPATIENT
Start: 2025-02-11 | End: 2025-02-11 | Stop reason: HOSPADM

## 2025-02-11 RX ADMIN — METHYLPREDNISOLONE SODIUM SUCCINATE 20 MG: 40 INJECTION, POWDER, FOR SOLUTION INTRAMUSCULAR; INTRAVENOUS at 13:51

## 2025-02-11 RX ADMIN — ACETAMINOPHEN 650 MG: 325 TABLET ORAL at 13:50

## 2025-02-11 RX ADMIN — IMMUNE GLOBULIN INFUSION (HUMAN) 35 G: 100 INJECTION, SOLUTION INTRAVENOUS; SUBCUTANEOUS at 14:30

## 2025-02-11 RX ADMIN — DIPHENHYDRAMINE HYDROCHLORIDE 25 MG: 25 CAPSULE ORAL at 13:50

## 2025-02-11 ASSESSMENT — ENCOUNTER SYMPTOMS: FATIGUE: 1

## 2025-02-11 ASSESSMENT — PAIN SCALES - GENERAL: PAINLEVEL_OUTOF10: 0-NO PAIN

## 2025-02-11 NOTE — PROGRESS NOTES
Patient seen in infusion for IVIG, tolerated without complications. Seen at chairside by Ellie DALE CNP. Labs and schedule reviewed with patient. IV removed. Discharged in stable condition.

## 2025-02-11 NOTE — PROGRESS NOTES
IVIG rates   45 ml/hr for 30 mins   90 ml/hr for 30 mins   180 ml/hr for 30 mins   360 ml/hr for rest of infusion

## 2025-02-15 ASSESSMENT — ENCOUNTER SYMPTOMS: COUGH: 1

## 2025-03-07 NOTE — PROGRESS NOTES
Addressed during visit  Rip Avalos is a 73 y.o. male who presents in stable condition for IVIG infusion    Since his last visit, he has been doing well.  Overall, he states his energy level is stable.  His appetite & hydration status has been good.  He reports no complaints.      Seen by Aniket GARIBAY.    Patient tolerated infusion well and has been educated with the overall therapy plan. She has no other questions or complaints at this time AVS, lab results & future appointment provided. Patient discharged in stable condition with his wife.     Reviewed and approved by TAMERA MARTINEZ on 5/30/24 at 1640

## 2025-04-24 ENCOUNTER — APPOINTMENT (OUTPATIENT)
Dept: OPHTHALMOLOGY | Facility: CLINIC | Age: 75
End: 2025-04-24
Payer: MEDICARE

## 2025-04-24 DIAGNOSIS — H40.32X3 GLAUCOMA OF LEFT EYE ASSOCIATED WITH OCULAR TRAUMA, SEVERE STAGE: ICD-10-CM

## 2025-04-24 DIAGNOSIS — H40.001 GLAUCOMA SUSPECT OF RIGHT EYE: Primary | ICD-10-CM

## 2025-04-24 PROCEDURE — 92083 EXTENDED VISUAL FIELD XM: CPT | Performed by: STUDENT IN AN ORGANIZED HEALTH CARE EDUCATION/TRAINING PROGRAM

## 2025-04-24 PROCEDURE — 99213 OFFICE O/P EST LOW 20 MIN: CPT | Performed by: STUDENT IN AN ORGANIZED HEALTH CARE EDUCATION/TRAINING PROGRAM

## 2025-04-24 ASSESSMENT — TONOMETRY
OS_IOP_MMHG: 31
OD_IOP_MMHG: 22
IOP_METHOD: GOLDMANN APPLANATION

## 2025-04-24 ASSESSMENT — VISUAL ACUITY
OD_CC: 20/20
CORRECTION_TYPE: GLASSES
OS_CC: NLP
METHOD: SNELLEN - LINEAR

## 2025-04-24 ASSESSMENT — ENCOUNTER SYMPTOMS
PSYCHIATRIC NEGATIVE: 0
CARDIOVASCULAR NEGATIVE: 0
GASTROINTESTINAL NEGATIVE: 0
HEMATOLOGIC/LYMPHATIC NEGATIVE: 0
EYES NEGATIVE: 1
CONSTITUTIONAL NEGATIVE: 0
MUSCULOSKELETAL NEGATIVE: 0
ENDOCRINE NEGATIVE: 0
NEUROLOGICAL NEGATIVE: 0
ALLERGIC/IMMUNOLOGIC NEGATIVE: 0
RESPIRATORY NEGATIVE: 0

## 2025-04-24 ASSESSMENT — CONF VISUAL FIELD
OS_SUPERIOR_NASAL_RESTRICTION: 0
OD_NORMAL: 1
OD_SUPERIOR_TEMPORAL_RESTRICTION: 0
OS_INFERIOR_NASAL_RESTRICTION: 0
OS_INFERIOR_TEMPORAL_RESTRICTION: 0
OS_NORMAL: 1
OD_INFERIOR_TEMPORAL_RESTRICTION: 0
OD_INFERIOR_NASAL_RESTRICTION: 0
OD_SUPERIOR_NASAL_RESTRICTION: 0
OS_SUPERIOR_TEMPORAL_RESTRICTION: 0

## 2025-04-24 ASSESSMENT — CUP TO DISC RATIO: OD_RATIO: .50

## 2025-04-24 ASSESSMENT — REFRACTION_WEARINGRX
OS_SPHERE: BALANCE
OD_SPHERE: -0.50
OS_ADD: +2.50
OD_ADD: +2.50
OD_CYLINDER: +1.00
OD_AXIS: 175

## 2025-04-24 ASSESSMENT — EXTERNAL EXAM - LEFT EYE: OS_EXAM: DERMATOCHALASIS UL

## 2025-04-24 ASSESSMENT — EXTERNAL EXAM - RIGHT EYE: OD_EXAM: DERMATOCHALASIS UL

## 2025-04-24 ASSESSMENT — SLIT LAMP EXAM - LIDS
COMMENTS: MGD UL/LL
COMMENTS: MGD UL/LL

## 2025-04-24 NOTE — PROGRESS NOTES
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  -intraocular pressure (IOP) today stable 22/31 c TMAX 22/40  -s/p ce/iol/istent OD november 2021   -no pain in the left eye  -not currently on drops   -ONH appearance stable right eye (OD)  -Last OCT RNFL 10/24/24 stable right eye (OD) to previous without progression; sup and inf quad borderline thinning-normal statistical rate of progression  -HVF 24-2 today 4/24/25 OD: inferior N+T defects-worsening to previous field 4/2024 c a few new nasal defects  -Ed on importance of monitoring to rule out signs of progression  -Right eye higher suspicion for changes today given worsening HVF 24-2 appearance. IOP continues to measure hovering around TMAX right eye. Last OCT RNFL OD 10/2024 was very stable to 2018/2016. Will watch closely with repeat OCT RNFL and HVF 24-2 at annual exam. If testing is repeatable on HVF 24-2, changes noted on OCT, or IOP continues to remain elevated will likely start treatment at that time-discussed with patient today  -Okay to continue off drops left eye (OS) since eye is no light perception (NLP) and without pain. If IOP creeps back up to >25 we will start txt at that time for conservative measure in monocular pt.      RTC for annual exam 10/2025 with MRX, DFE, OCT RNFL, HVF 24-2

## 2025-05-07 DIAGNOSIS — Z92.850 HISTORY OF CHIMERIC ANTIGEN RECEPTOR T-CELL THERAPY: ICD-10-CM

## 2025-05-09 ASSESSMENT — ENCOUNTER SYMPTOMS
CARDIOVASCULAR NEGATIVE: 1
GASTROINTESTINAL NEGATIVE: 1
HEMATOLOGIC/LYMPHATIC NEGATIVE: 1
ENDOCRINE NEGATIVE: 1
FATIGUE: 1
MUSCULOSKELETAL NEGATIVE: 1
PSYCHIATRIC NEGATIVE: 1
EYES NEGATIVE: 1
COUGH: 1

## 2025-05-09 NOTE — PROGRESS NOTES
Patient ID:  Rip Avalos is a 74 y.o. male.  Referring Physician:   Dr Sue  Primary Care Provider:  Snu Yuen MD    Assessment/Plan    5/13/25 CASE 2422 CART on 5/16/23 (2 years). No signs/sx of recurrence. IgG >400. If level remains stable >400 x8 months, proceed with Vaccine Series 2.     RTC   8/12 Dr Sue and IVIG (with labs week prior to check IgG level)    Oncology History Overview Note   Treatment Synopsis:    - Relapsed Mantle Cell Lymphoma, originally diagnosed  Aug 2021 with lymphadenopathy and esophageal mass, biopsy of which consistent with mantle cell lymphoma, Ki67 of 10%  - s/p Zak/Rituxan x 4 cycles, followed by maintenance Rituxan (completed Aug 2022)  - PET scan (2/6/23): multiple FDG avid nodes in mediastinum and hilar area, no LN below diaphragm  - Repeat PET scan (4/24/23): persistent thoracic LN, new intraluminal masses in transverse colon concerning for disease  - BMBx (4/6/23): negative for lymphoma  - Admitted for CART (T0= 5/16/23) prep with flu/cy on CASE 2422  - 5/16/23: CART, infused with 17.5 x10^6/Kg cells     PET CT day +30, D+6, D+90 STEFFI    4/30/24 1 year PET CT:  1. Focal hypermetabolic right hilar lymph node which could be related to inflammatory changes but is concerning for disease recurrence. No other hypermetabolic lymph nodes are visualized. Deauville 5 if  related to recurrent disease.  2. Nonspecific mild hypermetabolic activity in the distal esophagus which may represent inflammatory changes, however, disease involvement cannot be ruled out. Direct visualization can be considered as clinically indicated.  3. No evidence of hypermetabolic activity within the spleen or bone marrow to suggest lymphomatous involvement.    === 07/15/24 ===NM PET CT LYMPHOMA STAGING    1. Interval resolution of the previously noted hypermetabolic right hilar lymph node, which was likely reactive. No hypermetabolic selvin or extranodal disease activity (Deauville score  of 1).  2. Interval resolution of the previously noted mild metabolic activity in the distal esophagus.               Mantle cell lymphoma   2023 Initial Diagnosis    Mantle cell lymphoma (CMS/HCC)        Mr. Avalos is a 73 y/o M w/ PMH of severe vertigo, HLD, GERD/ Ceja's esophagus, prostate cancer (2016) s/p brachytherapy, MCL ( dx: 2021) s/p BR x 4 followed  by maintenance ritux, unfortunately found to have evidence of relapsed disease on PET scan (23) with biopsy confirmation (23) who was referred for treatment of recurrent MCL. S/p CAR-T,      Relapsed MCL  - Intially diagnosed 2021; MIPI: N/A; ECO; KPS of 70  - TP53: negative, ki-67: 10%  - s/p BR x 4 cycles, followed by maintenance ritux x 4 ( last dose 2022)  - PET scan (23): increased uptake in hilar/ mediastinal LN concerning for relapsed disease  - Bronchoscopy (23): biopsy consistent with MCL ( no Ki-67 or TP53 reported)  - BM bx (2023) STEFFI     Treated with clinical trial CASE 2422; preparative regimen included fludarabine 30mg/m2 & cyclophosphamide 500mg/m2 on days T-4, T-3, & T-2. Hospital course complicated by grade 3 neurotox and grade 2 CRS with fever and confusion; infectious workup negative, CTH negative for acute process, managed w/ toci x2 doses and dexamethasone taper that was completed on . Steroid induced hallucinations  resolved at discharge.      Response Monitoring:   Day 30 response monitoring: PET scan (23) notes CR (Deauville 1)   Day 60 response monitoring: PET scan (23) notes CR (Deauville 1)   Day 90 response monitoring: PET scan (23) notes CR (Deauville 1)  Day +365 response monitoring: PET scan (24) -  some concern over hilar lymph node   DD infectious vs lymphoma - will repeat PET in 2 months.   14 months (24) PET scan has normalized. (Deauville 1) . The patient remains in remission    Infectious prophylaxis:    Antiviral prophylaxis: acyclovir,  stop 24    PCP prophylaxis: TMP-SMX DS, M/W/F;  stop 5/14/24   Antifungal prophylaxis: fluconazole,  completed     Viral monitoring  10/24/24 CMV ND     Hypogammaglobulinemia/frequent infections  IVIG 12/31/24 and 2/11/25 5/13/25 IgG 461.     10/29/24  CT chest    1.  No evidence of acute pathology in the chest. No evidence of focal consolidation or pneumonia.  2. Suggestion of mild subpleural reticulation within the inferior lower lobes. Correlate with concern for interstitial lung disease.  3. Multiple metallic structures within the bilateral lower lobes and inferior medial left upper lobe, unchanged compared to 2022. Findings are suggestive of aspirated or embolized metallic foreign material.  4. Severe coronary artery calcifications and prominent aortic valvular calcifications.. Additional stable chronic findings as above.     Anti-infective Vaccines:   12/17/23 RSV  10/8/24 Flu  10/8/24 Most recent Covid infestion    Vaccine Series 1  12/7/23  2/8/24  4/4/24  10/22/24    Vaccine Series 2  Wait at least 8 months from last IVIG and 2 years post CAR T-cell until he is eligible for live vaccines.   Once meets that criteria, can add in the MMR vaccines into the plan.     Cardiac:   ECHO 4/6/23: EF 55-60%, impaired pattern of relaxation, atrial septal aneurism present   ECHO 6/15/23: EF 50-55%, large PFO seen with bubble study, atrial septal aneurism present; no clots seen     Hx HLD: continue atorvastatin  ASA 81 mg restarted at discharge      PE  Patient presented to the ED on 6/8/23 with R sided low back pain that started acutely that evening; pain worse with inspiration. CT revealed bilateral PEs. US BLE negative for DVT.  Started on heparin drip in the ED and patient was admitted for further workup and management of PE.   IInitially patient was transitioned to LMWH, however, because of prescription cost he was discharged on Eliquis and will continue for 6mths.  until end of January 2024, since it's deemed provoked in  nature  Completed Eliquis Jan 2024     Neuro/psych  Hx of vertigo, stable. Able to navigate at home; no falls.   Grade 3 neurotoxicity with onset 5/25/23  Steroid induced hallucinations, rapid dexamethasone taper (10 mg Q6 on 5/25, then Q8 on  5/27& q12 5/28, q24 5/29, then completed during hospitalization on 5/30/23) sx resolved.  6/20 all neuro symptoms long resolved.      Mood  6/23 Lexapro.      Ophthalmic  Hx of L eye corneal abrasion, occular hypertension  s/p surgical intervention w/ scleral buckling (1993), photocoagulation (1993)  Legally blind in L eye, now managed w/ eye patch  4/24/25 Ophthalmology     Health maintenance  8/29/24 EGD and colonoscopy  11/21/24 PCP      Subjective    History of Present Illness:  Mr Avalos presents to the clinic today with his wife.    No new complaints.    Next cruise in the fall.    Energy level 6-7/10. Naps.    Appetite 70%. Weight stable.     No drenching night sweats.     Still cough, varies. Abler to sleep in bed now. Sneezes and laughing will set it off.     Recovered from Covid. Did not tolerate Paxlovid well.     Vertigo.    Fell out of shower 2m ago.      Review of Systems   Constitutional:  Positive for appetite change and fatigue.   HENT:  Negative.     Eyes: Negative.    Respiratory:  Positive for cough.         Sob with stairs.  Decreased endurance.    Cardiovascular: Negative.    Gastrointestinal: Negative.    Endocrine: Negative.    Genitourinary: Negative.     Musculoskeletal: Negative.         Hands   Skin: Negative.    Neurological:  Positive for light-headedness.        Vertigo   Hematological: Negative.    Psychiatric/Behavioral: Negative.              Objective        Physical Exam  Vitals reviewed.   Constitutional:       Appearance: Normal appearance.      Comments: Well appearing.  Ambulates with walking care.   HENT:      Head: Normocephalic and atraumatic.      Nose: Nose normal.      Mouth/Throat:      Mouth: Mucous membranes are moist.   Eyes:       Pupils: Pupils are equal, round, and reactive to light.      Comments: Eye patch.    Cardiovascular:      Rate and Rhythm: Normal rate and regular rhythm.      Pulses: Normal pulses.      Heart sounds: Normal heart sounds.   Pulmonary:      Effort: Pulmonary effort is normal.      Breath sounds: Normal breath sounds.   Abdominal:      General: Abdomen is flat. Bowel sounds are normal.      Palpations: Abdomen is soft.   Musculoskeletal:         General: Normal range of motion.      Cervical back: Normal range of motion.   Skin:     General: Skin is warm and dry.   Neurological:      General: No focal deficit present.      Mental Status: He is alert and oriented to person, place, and time.   Psychiatric:         Mood and Affect: Mood normal.

## 2025-05-10 DIAGNOSIS — E78.00 PURE HYPERCHOLESTEROLEMIA: ICD-10-CM

## 2025-05-10 RX ORDER — ATORVASTATIN CALCIUM 40 MG/1
TABLET, FILM COATED ORAL
Qty: 45 TABLET | Refills: 0 | Status: SHIPPED | OUTPATIENT
Start: 2025-05-10

## 2025-05-13 ENCOUNTER — APPOINTMENT (OUTPATIENT)
Dept: HEMATOLOGY/ONCOLOGY | Facility: HOSPITAL | Age: 75
End: 2025-05-13
Payer: MEDICARE

## 2025-05-13 ENCOUNTER — OFFICE VISIT (OUTPATIENT)
Dept: HEMATOLOGY/ONCOLOGY | Facility: HOSPITAL | Age: 75
End: 2025-05-13
Payer: MEDICARE

## 2025-05-13 ENCOUNTER — LAB (OUTPATIENT)
Dept: LAB | Facility: HOSPITAL | Age: 75
End: 2025-05-13
Payer: MEDICARE

## 2025-05-13 ENCOUNTER — DOCUMENTATION (OUTPATIENT)
Dept: HEMATOLOGY/ONCOLOGY | Facility: HOSPITAL | Age: 75
End: 2025-05-13
Payer: MEDICARE

## 2025-05-13 VITALS
HEART RATE: 74 BPM | RESPIRATION RATE: 20 BRPM | WEIGHT: 205.5 LBS | SYSTOLIC BLOOD PRESSURE: 125 MMHG | OXYGEN SATURATION: 96 % | BODY MASS INDEX: 31.14 KG/M2 | TEMPERATURE: 98.1 F | DIASTOLIC BLOOD PRESSURE: 83 MMHG | HEIGHT: 68 IN

## 2025-05-13 DIAGNOSIS — C83.10 MANTLE CELL LYMPHOMA, UNSPECIFIED BODY REGION (MULTI): ICD-10-CM

## 2025-05-13 DIAGNOSIS — Z92.21 PERSONAL HISTORY OF ANTINEOPLASTIC CHEMOTHERAPY: ICD-10-CM

## 2025-05-13 DIAGNOSIS — D80.1 HYPOGAMMAGLOBULINEMIA (MULTI): ICD-10-CM

## 2025-05-13 DIAGNOSIS — Z92.850 HISTORY OF CHIMERIC ANTIGEN RECEPTOR T-CELL THERAPY: ICD-10-CM

## 2025-05-13 DIAGNOSIS — D84.9 IMMUNOCOMPROMISED STATE: ICD-10-CM

## 2025-05-13 LAB
ALBUMIN SERPL BCP-MCNC: 4.1 G/DL (ref 3.4–5)
ALP SERPL-CCNC: 87 U/L (ref 33–136)
ALT SERPL W P-5'-P-CCNC: 23 U/L (ref 10–52)
ANION GAP SERPL CALC-SCNC: 13 MMOL/L (ref 10–20)
AST SERPL W P-5'-P-CCNC: 16 U/L (ref 9–39)
BASOPHILS # BLD AUTO: 0.07 X10*3/UL (ref 0–0.1)
BASOPHILS NFR BLD AUTO: 1.5 %
BILIRUB SERPL-MCNC: 0.5 MG/DL (ref 0–1.2)
BUN SERPL-MCNC: 12 MG/DL (ref 6–23)
CALCIUM SERPL-MCNC: 8.9 MG/DL (ref 8.6–10.3)
CHLORIDE SERPL-SCNC: 105 MMOL/L (ref 98–107)
CO2 SERPL-SCNC: 28 MMOL/L (ref 21–32)
CREAT SERPL-MCNC: 1.01 MG/DL (ref 0.5–1.3)
EGFRCR SERPLBLD CKD-EPI 2021: 77 ML/MIN/1.73M*2
EOSINOPHIL # BLD AUTO: 0.15 X10*3/UL (ref 0–0.4)
EOSINOPHIL NFR BLD AUTO: 3.2 %
ERYTHROCYTE [DISTWIDTH] IN BLOOD BY AUTOMATED COUNT: 12.9 % (ref 11.5–14.5)
GLUCOSE SERPL-MCNC: 79 MG/DL (ref 74–99)
HCT VFR BLD AUTO: 47.4 % (ref 41–52)
HGB BLD-MCNC: 15.3 G/DL (ref 13.5–17.5)
IGG SERPL-MCNC: 461 MG/DL (ref 700–1600)
IMM GRANULOCYTES # BLD AUTO: 0.02 X10*3/UL (ref 0–0.5)
IMM GRANULOCYTES NFR BLD AUTO: 0.4 % (ref 0–0.9)
LDH SERPL L TO P-CCNC: 124 U/L (ref 84–246)
LYMPHOCYTES # BLD AUTO: 0.58 X10*3/UL (ref 0.8–3)
LYMPHOCYTES NFR BLD AUTO: 12.3 %
MCH RBC QN AUTO: 30.5 PG (ref 26–34)
MCHC RBC AUTO-ENTMCNC: 32.3 G/DL (ref 32–36)
MCV RBC AUTO: 95 FL (ref 80–100)
MONOCYTES # BLD AUTO: 0.67 X10*3/UL (ref 0.05–0.8)
MONOCYTES NFR BLD AUTO: 14.2 %
NEUTROPHILS # BLD AUTO: 3.22 X10*3/UL (ref 1.6–5.5)
NEUTROPHILS NFR BLD AUTO: 68.4 %
NRBC BLD-RTO: 0 /100 WBCS (ref 0–0)
PLATELET # BLD AUTO: 182 X10*3/UL (ref 150–450)
POTASSIUM SERPL-SCNC: 4.5 MMOL/L (ref 3.5–5.3)
PROT SERPL-MCNC: 6.4 G/DL (ref 6.4–8.2)
RBC # BLD AUTO: 5.01 X10*6/UL (ref 4.5–5.9)
SODIUM SERPL-SCNC: 141 MMOL/L (ref 136–145)
WBC # BLD AUTO: 4.7 X10*3/UL (ref 4.4–11.3)

## 2025-05-13 PROCEDURE — 85025 COMPLETE CBC W/AUTO DIFF WBC: CPT

## 2025-05-13 PROCEDURE — 83615 LACTATE (LD) (LDH) ENZYME: CPT

## 2025-05-13 PROCEDURE — 99214 OFFICE O/P EST MOD 30 MIN: CPT | Performed by: NURSE PRACTITIONER

## 2025-05-13 PROCEDURE — 82784 ASSAY IGA/IGD/IGG/IGM EACH: CPT

## 2025-05-13 PROCEDURE — 36415 COLL VENOUS BLD VENIPUNCTURE: CPT

## 2025-05-13 PROCEDURE — 80053 COMPREHEN METABOLIC PANEL: CPT

## 2025-05-13 RX ORDER — ASPIRIN 81 MG/1
81 TABLET ORAL DAILY
COMMUNITY

## 2025-05-13 ASSESSMENT — PAIN SCALES - GENERAL: PAINLEVEL_OUTOF10: 0-NO PAIN

## 2025-05-13 ASSESSMENT — ENCOUNTER SYMPTOMS
LIGHT-HEADEDNESS: 1
APPETITE CHANGE: 1

## 2025-05-14 NOTE — RESEARCH NOTES
Research Note Follow Up Visit       Rip Avalos is here today for T+2 years follow up on [id number] (T=0 5.16.2023).  Follow up procedures completed per protocol. Long-Term Follow-Up Checklist completed with patient.  Patient is aware of continued follow up plan.  Updated contact information for clinical trials team provided to patient and wife.       Education Documentation  Memory Problems, taught by Nancy Kiran RN at 5/13/2025  1:30 PM.  Learner: Patient  Readiness: Acceptance  Method: Explanation  Response: Verbalizes Understanding    Fatigue, taught by Nancy Kiran RN at 5/13/2025  1:30 PM.  Learner: Patient  Readiness: Acceptance  Method: Explanation  Response: Verbalizes Understanding    Comprehensive Metabolic Panel (CMP), taught by Nancy Kiran RN at 5/13/2025  1:30 PM.  Learner: Patient  Readiness: Acceptance  Method: Explanation  Response: Verbalizes Understanding    Complete Blood Count with Differential (CBC w/ Diff), taught by Nancy Kiran RN at 5/13/2025  1:30 PM.  Learner: Patient  Readiness: Acceptance  Method: Explanation  Response: Verbalizes Understanding    Post-Chemotherapy Education, taught by Nancy Kiran RN at 5/13/2025  1:30 PM.  Learner: Patient  Readiness: Acceptance  Method: Explanation  Response: Verbalizes Understanding    Supportive Medications, taught by Nancy Kiran RN at 5/13/2025  1:30 PM.  Learner: Patient  Readiness: Acceptance  Method: Explanation  Response: Verbalizes Understanding    Education Comments  No comments found.

## 2025-05-21 ENCOUNTER — APPOINTMENT (OUTPATIENT)
Dept: PRIMARY CARE | Facility: CLINIC | Age: 75
End: 2025-05-21
Payer: MEDICARE

## 2025-05-21 VITALS
SYSTOLIC BLOOD PRESSURE: 98 MMHG | DIASTOLIC BLOOD PRESSURE: 71 MMHG | BODY MASS INDEX: 32.27 KG/M2 | HEIGHT: 67 IN | WEIGHT: 205.6 LBS

## 2025-05-21 DIAGNOSIS — R79.9 ABNORMAL FINDING OF BLOOD CHEMISTRY, UNSPECIFIED: ICD-10-CM

## 2025-05-21 DIAGNOSIS — E78.00 HIGH CHOLESTEROL: Primary | ICD-10-CM

## 2025-05-21 DIAGNOSIS — M05.711 RHEUMATOID ARTHRITIS INVOLVING RIGHT SHOULDER WITH POSITIVE RHEUMATOID FACTOR (MULTI): ICD-10-CM

## 2025-05-21 DIAGNOSIS — I10 HYPERTENSION, UNSPECIFIED TYPE: ICD-10-CM

## 2025-05-21 DIAGNOSIS — Z12.5 PROSTATE CANCER SCREENING: ICD-10-CM

## 2025-05-21 DIAGNOSIS — I50.20 SYSTOLIC HEART FAILURE, UNSPECIFIED HF CHRONICITY: ICD-10-CM

## 2025-05-21 DIAGNOSIS — C80.1 CANCER (MULTI): ICD-10-CM

## 2025-05-21 DIAGNOSIS — R73.03 PRE-DIABETES: ICD-10-CM

## 2025-05-21 DIAGNOSIS — C83.18 MANTLE CELL LYMPHOMA, LYMPH NODES OF MULTIPLE SITES (MULTI): ICD-10-CM

## 2025-05-21 DIAGNOSIS — D61.818 OTHER PANCYTOPENIA (MULTI): ICD-10-CM

## 2025-05-21 PROCEDURE — 3074F SYST BP LT 130 MM HG: CPT | Performed by: INTERNAL MEDICINE

## 2025-05-21 PROCEDURE — 1159F MED LIST DOCD IN RCRD: CPT | Performed by: INTERNAL MEDICINE

## 2025-05-21 PROCEDURE — 99213 OFFICE O/P EST LOW 20 MIN: CPT | Performed by: INTERNAL MEDICINE

## 2025-05-21 PROCEDURE — 1160F RVW MEDS BY RX/DR IN RCRD: CPT | Performed by: INTERNAL MEDICINE

## 2025-05-21 PROCEDURE — 3078F DIAST BP <80 MM HG: CPT | Performed by: INTERNAL MEDICINE

## 2025-05-21 NOTE — PROGRESS NOTES
"Subjective   Patient ID: Rip Avalos is a 74 y.o. male who presents for Follow-up (on various conditions./).    Kady Avalos today came here for multiple medical issues.  Overall, he is okay. He is keeping well with his blood team, cancer team.  Doing okay.  No problem.  Appetite and weight are okay.  His dizziness okay.  He is coping well with it.  He came for follow-up on various conditions.    I have personally reviewed the patient's Past Medical History, Medications, Allergies, Social History, and Family History in the EMR.    Review of Systems   All other systems reviewed and are negative.    Objective   BP 98/71   Ht 1.702 m (5' 7\")   Wt 93.3 kg (205 lb 9.6 oz)   BMI 32.20 kg/m²     Physical Exam  Vitals reviewed.   Cardiovascular:      Heart sounds: Normal heart sounds, S1 normal and S2 normal. No murmur heard.     No friction rub.   Pulmonary:      Effort: Pulmonary effort is normal.      Breath sounds: Normal breath sounds and air entry.   Abdominal:      Palpations: There is no hepatomegaly, splenomegaly or mass.   Musculoskeletal:      Right lower leg: No edema.      Left lower leg: No edema.   Lymphadenopathy:      Lower Body: No right inguinal adenopathy. No left inguinal adenopathy.   Neurological:      Cranial Nerves: Cranial nerves 2-12 are intact.      Sensory: No sensory deficit.      Motor: Motor function is intact.      Deep Tendon Reflexes: Reflexes are normal and symmetric.     LAB WORK: Laboratory testing discussed.    Assessment/Plan   Problem List Items Addressed This Visit           ICD-10-CM    Hypertension I10    Relevant Orders    Comprehensive Metabolic Panel     Other Visit Diagnoses         Codes      High cholesterol    -  Primary E78.00    Relevant Orders    TSH with reflex to Free T4 if abnormal      Pre-diabetes     R73.03    Relevant Orders    Lipid Panel    Comprehensive Metabolic Panel      Abnormal finding of blood chemistry, unspecified     R79.9    Relevant " Orders    Lipid Panel      Prostate cancer screening     Z12.5      Cancer (Multi)     C80.1        1. High cholesterol.  He is on statin.  I will check blood work.  2. Prostate health, up-to-date.  3. Cancer and thing.  He is seeing specialist.  4. Follow-up appointment with me in a week after blood tests.  5. I welcomed back in my practice.    Jacinto Attestation  By signing my name below, I, Jacinto Burks attest that this documentation has been prepared under the direction and in the presence of Sun Yuen MD.     All medical record entries made by the jacinto were personally dictated by me I have reviewed the chart and agree the record accurately reflects my personal performance of his history physical examination and management

## 2025-05-25 ENCOUNTER — ANCILLARY PROCEDURE (OUTPATIENT)
Dept: URGENT CARE | Age: 75
End: 2025-05-25
Payer: MEDICARE

## 2025-05-25 ENCOUNTER — OFFICE VISIT (OUTPATIENT)
Dept: URGENT CARE | Age: 75
End: 2025-05-25
Payer: MEDICARE

## 2025-05-25 VITALS
HEART RATE: 92 BPM | TEMPERATURE: 97.8 F | WEIGHT: 205 LBS | OXYGEN SATURATION: 96 % | SYSTOLIC BLOOD PRESSURE: 139 MMHG | HEIGHT: 70 IN | RESPIRATION RATE: 18 BRPM | DIASTOLIC BLOOD PRESSURE: 77 MMHG | BODY MASS INDEX: 29.35 KG/M2

## 2025-05-25 DIAGNOSIS — R07.81 RIB PAIN ON RIGHT SIDE: Primary | ICD-10-CM

## 2025-05-25 DIAGNOSIS — R07.81 RIB PAIN ON RIGHT SIDE: ICD-10-CM

## 2025-05-25 DIAGNOSIS — S22.31XA CLOSED FRACTURE OF ONE RIB OF RIGHT SIDE, INITIAL ENCOUNTER: ICD-10-CM

## 2025-05-25 PROCEDURE — 99204 OFFICE O/P NEW MOD 45 MIN: CPT | Performed by: SPECIALIST

## 2025-05-25 PROCEDURE — 3075F SYST BP GE 130 - 139MM HG: CPT | Performed by: SPECIALIST

## 2025-05-25 PROCEDURE — 1036F TOBACCO NON-USER: CPT | Performed by: SPECIALIST

## 2025-05-25 PROCEDURE — 1159F MED LIST DOCD IN RCRD: CPT | Performed by: SPECIALIST

## 2025-05-25 PROCEDURE — 3078F DIAST BP <80 MM HG: CPT | Performed by: SPECIALIST

## 2025-05-25 PROCEDURE — 1125F AMNT PAIN NOTED PAIN PRSNT: CPT | Performed by: SPECIALIST

## 2025-05-25 PROCEDURE — 71101 X-RAY EXAM UNILAT RIBS/CHEST: CPT | Mod: RIGHT SIDE | Performed by: SPECIALIST

## 2025-05-25 ASSESSMENT — ENCOUNTER SYMPTOMS
SHORTNESS OF BREATH: 0
CONSTITUTIONAL NEGATIVE: 1
COUGH: 0

## 2025-05-25 ASSESSMENT — PAIN SCALES - GENERAL: PAINLEVEL_OUTOF10: 5

## 2025-05-25 NOTE — PROGRESS NOTES
"Subjective   Patient ID: Rip Avalos is a 74 y.o. male. They present today with a chief complaint of Injury (RT Side Rib Pain after reaching into recycle bin//Pt felt a \"POP\" and now has pain x 4 days//Hx of Lung CA-in Bronchial tubes).    History of Present Illness    Injury  Associated symptoms: no cough and no shortness of breath        Past Medical History  Allergies as of 05/25/2025    (No Known Allergies)       Prescriptions Prior to Admission[1]     Medical History[2]    Surgical History[3]     reports that he has never smoked. He has never used smokeless tobacco. He reports that he does not drink alcohol.    Review of Systems  Review of Systems   Constitutional: Negative.    Respiratory:  Negative for cough and shortness of breath.                                   Objective    Vitals:    05/25/25 1615   BP: 139/77   BP Location: Left arm   Patient Position: Sitting   Pulse: 92   Resp: 18   Temp: 36.6 °C (97.8 °F)   TempSrc: Oral   SpO2: 96%   Weight: 93 kg (205 lb)   Height: 1.778 m (5' 10\")     No LMP for male patient.    Physical Exam  Constitutional:       Appearance: Normal appearance.   Cardiovascular:      Rate and Rhythm: Normal rate and regular rhythm.   Pulmonary:      Effort: Pulmonary effort is normal.      Breath sounds: Normal breath sounds. No wheezing, rhonchi or rales.   Chest:      Chest wall: Tenderness present.          Comments: Tenderness on right lower ant. Ribs.  Neurological:      Mental Status: He is alert.         Procedures    Point of Care Test & Imaging Results from this visit  No results found for this visit on 05/25/25.   Imaging  XR ribs right 2 views w chest pa or ap  Result Date: 5/25/2025  Acute nondisplaced fracture of the anterior right 10th rib.   No acute cardiopulmonary process.   MACRO: None   Signed by: Stevan Johnson 5/25/2025 5:32 PM Dictation workstation:   [id number]      Cardiology, Vascular, and Other Imaging  No other imaging results found for the " past 2 days      Diagnostic study results (if any) were reviewed by [facility].    Assessment/Plan   Allergies, medications, history, and pertinent labs/EKGs/Imaging reviewed by Karlene Hobson MD.     Medical Decision Making   Fracture of Rib#10, will treat symptoms with Topical Lidoderm .    Orders and Diagnoses  Diagnoses and all orders for this visit:  Rib pain on right side  -     XR ribs right 2 views w chest pa or ap; Future      Medical Admin Record      Patient disposition: Home    Electronically signed by [facility]  5:56 PM           [1] (Not in a hospital admission)  [2]   Past Medical History:  Diagnosis Date    Acute pharyngitis, unspecified 01/31/2018    Acute pharyngitis    Acute pulmonary embolism with acute cor pulmonale, unspecified pulmonary embolism type (Multi)     Acute sinusitis, unspecified 01/31/2018    Acute sinusitis    Arthritis     BPH (benign prostatic hyperplasia)     Calculus of kidney 01/31/2018    Kidney stone on right side    Chest pain, unspecified 01/31/2018    Chest pain    Cystoid macular degeneration, right eye 01/31/2018    Cystoid macular edema of right eye    Dizziness and giddiness 01/31/2018    Dizziness    Gastro-esophageal reflux disease without esophagitis 01/31/2018    Esophageal reflux    High cholesterol     Mantle cell lymphoma     Migraine, unspecified, not intractable, without status migrainosus 01/31/2018    Migraine headache    Other fatigue 01/31/2018    Fatigue    Personal history of malignant neoplasm, unspecified     History of malignant neoplasm    Personal history of other diseases of the circulatory system 04/15/2013    History of hypertension    Pure hypercholesterolemia, unspecified 09/24/2013    Low-density-lipoid-type (LDL) hyperlipoproteinemia    Pure hypercholesterolemia, unspecified 01/31/2018    Hypercholesterolemia    Rheumatoid arthritis, unspecified 01/31/2018    Rheumatoid arthritis    Stem cells transplant status (Multi)      Thrombocytopenia, unspecified 01/31/2018    Thrombocytopenia    Unspecified osteoarthritis, unspecified site 01/31/2018    Osteoarthritis    Vitamin deficiency, unspecified 01/31/2018    Vitamin deficiency   [3]   Past Surgical History:  Procedure Laterality Date    CT ABDOMEN PELVIS ANGIOGRAM W AND/OR WO IV CONTRAST  6/9/2023    CT ABDOMEN PELVIS ANGIOGRAM W AND/OR WO IV CONTRAST 6/9/2023 [facility] CT    HAND SURGERY  09/26/2016    Hand Surgery                                                                                                                                                          IR CVC TUNNELED  5/1/2023    IR CVC TUNNELED 5/1/2023 [facility] ANGIO    KIDNEY SURGERY  09/26/2016    Kidney Surgery    MR HEAD ANGIO WO IV CONTRAST  9/4/2013    MR HEAD ANGIO WO IV CONTRAST [facility]    OTHER SURGICAL HISTORY  12/07/2022    Lymph node surgery    OTHER SURGICAL HISTORY  09/26/2016    Scler Buckle Repair Sufficient Fluid Drained, Retina Inspect    OTHER SURGICAL HISTORY  09/26/2016    Root Canal

## 2025-05-25 NOTE — PATIENT INSTRUCTIONS
"Take deep breaths frequently , you also can use the device \" Incentive Spirometer\" .  Apply Lidoderm patches daily x 12 hours.   If you developed shortness of breath seek help immediately.  "

## 2025-05-26 ENCOUNTER — TELEPHONE (OUTPATIENT)
Dept: URGENT CARE | Age: 75
End: 2025-05-26

## 2025-05-26 PROBLEM — D61.818 OTHER PANCYTOPENIA (MULTI): Status: ACTIVE | Noted: 2023-09-19

## 2025-05-26 PROBLEM — I50.20 SYSTOLIC HEART FAILURE, UNSPECIFIED HF CHRONICITY: Status: ACTIVE | Noted: 2025-05-26

## 2025-06-01 ENCOUNTER — ANCILLARY PROCEDURE (OUTPATIENT)
Dept: URGENT CARE | Age: 75
End: 2025-06-01
Payer: MEDICARE

## 2025-06-01 ENCOUNTER — OFFICE VISIT (OUTPATIENT)
Dept: URGENT CARE | Age: 75
End: 2025-06-01
Payer: MEDICARE

## 2025-06-01 VITALS
SYSTOLIC BLOOD PRESSURE: 117 MMHG | HEIGHT: 70 IN | TEMPERATURE: 97.8 F | DIASTOLIC BLOOD PRESSURE: 68 MMHG | BODY MASS INDEX: 29.35 KG/M2 | HEART RATE: 84 BPM | WEIGHT: 205 LBS | RESPIRATION RATE: 16 BRPM | OXYGEN SATURATION: 96 %

## 2025-06-01 DIAGNOSIS — R10.9 SIDE PAIN: ICD-10-CM

## 2025-06-01 DIAGNOSIS — R10.9 SIDE PAIN: Primary | ICD-10-CM

## 2025-06-01 PROCEDURE — 99213 OFFICE O/P EST LOW 20 MIN: CPT | Performed by: PHYSICIAN ASSISTANT

## 2025-06-01 PROCEDURE — 71046 X-RAY EXAM CHEST 2 VIEWS: CPT | Performed by: PHYSICIAN ASSISTANT

## 2025-06-01 PROCEDURE — 3078F DIAST BP <80 MM HG: CPT | Performed by: PHYSICIAN ASSISTANT

## 2025-06-01 PROCEDURE — 1159F MED LIST DOCD IN RCRD: CPT | Performed by: PHYSICIAN ASSISTANT

## 2025-06-01 PROCEDURE — 71100 X-RAY EXAM RIBS UNI 2 VIEWS: CPT | Mod: LEFT SIDE | Performed by: PHYSICIAN ASSISTANT

## 2025-06-01 PROCEDURE — 1036F TOBACCO NON-USER: CPT | Performed by: PHYSICIAN ASSISTANT

## 2025-06-01 PROCEDURE — 3074F SYST BP LT 130 MM HG: CPT | Performed by: PHYSICIAN ASSISTANT

## 2025-06-01 NOTE — PROGRESS NOTES
"Subjective   Patient ID: Rip Avalos is a 74 y.o. male. They present today with a chief complaint of PAIN (LEFT RIB PAIN ,  SNEEZED 3 DAYS ).  Patient concern for more rib fractures and came back to urgent care for imaging.  Pain when coughing or sneezing.    History of Present Illness  HPI    Past Medical History  Allergies as of 06/01/2025    (No Known Allergies)       Prescriptions Prior to Admission[1]     Medical History[2]    Surgical History[3]     reports that he has never smoked. He has never used smokeless tobacco. He reports that he does not drink alcohol.    Review of Systems  Review of Systems   Musculoskeletal:         Bilateral rib pain                                  Objective    Vitals:    06/01/25 1529   BP: 117/68   Pulse: 84   Resp: 16   Temp: 36.6 °C (97.8 °F)   TempSrc: Oral   SpO2: 96%   Weight: 93 kg (205 lb)   Height: 1.778 m (5' 10\")     No LMP for male patient.    Physical Exam  Vitals and nursing note reviewed.   Constitutional:       Appearance: Normal appearance.   Eyes:      Conjunctiva/sclera: Conjunctivae normal.   Cardiovascular:      Rate and Rhythm: Normal rate.   Pulmonary:      Effort: Pulmonary effort is normal. No respiratory distress.      Breath sounds: No wheezing or rhonchi.   Musculoskeletal:      Comments: Bilateral anterior and mid axillary rib tenderness.   Neurological:      Mental Status: He is alert.         Procedures    Point of Care Test & Imaging Results from this visit  No results found for this visit on 06/01/25.   Imaging  No results found.    Cardiology, Vascular, and Other Imaging  No other imaging results found for the past 2 days      Diagnostic study results (if any) were reviewed by Mathew Carolina PA-C.    Assessment/Plan   Allergies, medications, history, and pertinent labs/EKGs/Imaging reviewed by Mathew Carolina PA-C.     Medical Decision Making  Reviewed previous imaging with concern for right anterior 10th rib fracture.  Having worsening pain " after sneezing and now having left side pain.  Chest x-ray demonstrates no acute L rib fracture, PTX.   Supportive measures. Continue meds for pain control.     Orders and Diagnoses  There are no diagnoses linked to this encounter.    Medical Admin Record      Patient disposition: Home    Electronically signed by Mathew Carolina PA-C  3:34 PM           [1] (Not in a hospital admission)  [2]   Past Medical History:  Diagnosis Date    Acute pharyngitis, unspecified 01/31/2018    Acute pharyngitis    Acute pulmonary embolism with acute cor pulmonale, unspecified pulmonary embolism type (Multi)     Acute sinusitis, unspecified 01/31/2018    Acute sinusitis    Arthritis     BPH (benign prostatic hyperplasia)     Calculus of kidney 01/31/2018    Kidney stone on right side    Chest pain, unspecified 01/31/2018    Chest pain    Cystoid macular degeneration, right eye 01/31/2018    Cystoid macular edema of right eye    Dizziness and giddiness 01/31/2018    Dizziness    Gastro-esophageal reflux disease without esophagitis 01/31/2018    Esophageal reflux    High cholesterol     Mantle cell lymphoma     Migraine, unspecified, not intractable, without status migrainosus 01/31/2018    Migraine headache    Other fatigue 01/31/2018    Fatigue    Personal history of malignant neoplasm, unspecified     History of malignant neoplasm    Personal history of other diseases of the circulatory system 04/15/2013    History of hypertension    Pure hypercholesterolemia, unspecified 09/24/2013    Low-density-lipoid-type (LDL) hyperlipoproteinemia    Pure hypercholesterolemia, unspecified 01/31/2018    Hypercholesterolemia    Rheumatoid arthritis, unspecified 01/31/2018    Rheumatoid arthritis    Stem cells transplant status (Multi)     Thrombocytopenia, unspecified 01/31/2018    Thrombocytopenia    Unspecified osteoarthritis, unspecified site 01/31/2018    Osteoarthritis    Vitamin deficiency, unspecified 01/31/2018    Vitamin deficiency   [3]    Past Surgical History:  Procedure Laterality Date    CT ABDOMEN PELVIS ANGIOGRAM W AND/OR WO IV CONTRAST  6/9/2023    CT ABDOMEN PELVIS ANGIOGRAM W AND/OR WO IV CONTRAST 6/9/2023 [facility] [place] [facility]    HAND SURGERY  09/26/2016    Hand Surgery                                                                                                                                                          IR CVC TUNNELED  5/1/2023    IR CVC TUNNELED 5/1/2023 [facility] [place] ANGIO    KIDNEY SURGERY  09/26/2016    Kidney Surgery    MR HEAD ANGIO WO IV CONTRAST  9/4/2013    MR HEAD ANGIO WO IV CONTRAST [facility]    OTHER SURGICAL HISTORY  12/07/2022    Lymph node surgery    OTHER SURGICAL HISTORY  09/26/2016    Scler Buckle Repair Sufficient Fluid Drained, Retina Inspect    OTHER SURGICAL HISTORY  09/26/2016    Root Canal

## 2025-08-11 ENCOUNTER — LAB (OUTPATIENT)
Dept: LAB | Facility: CLINIC | Age: 75
End: 2025-08-11
Payer: MEDICARE

## 2025-08-11 DIAGNOSIS — Z92.21 PERSONAL HISTORY OF ANTINEOPLASTIC CHEMOTHERAPY: ICD-10-CM

## 2025-08-11 DIAGNOSIS — D80.1 HYPOGAMMAGLOBULINEMIA (MULTI): ICD-10-CM

## 2025-08-11 DIAGNOSIS — D84.9 IMMUNOCOMPROMISED STATE: ICD-10-CM

## 2025-08-11 DIAGNOSIS — Z92.850 HISTORY OF CHIMERIC ANTIGEN RECEPTOR T-CELL THERAPY: ICD-10-CM

## 2025-08-11 DIAGNOSIS — C83.10 MANTLE CELL LYMPHOMA, UNSPECIFIED BODY REGION (MULTI): ICD-10-CM

## 2025-08-11 LAB
ALBUMIN SERPL BCP-MCNC: 4.1 G/DL (ref 3.4–5)
ALP SERPL-CCNC: 85 U/L (ref 33–136)
ALT SERPL W P-5'-P-CCNC: 24 U/L (ref 10–52)
ANION GAP SERPL CALC-SCNC: 13 MMOL/L (ref 10–20)
AST SERPL W P-5'-P-CCNC: 18 U/L (ref 9–39)
BASOPHILS # BLD AUTO: 0.06 X10*3/UL (ref 0–0.1)
BASOPHILS NFR BLD AUTO: 1.2 %
BILIRUB SERPL-MCNC: 0.5 MG/DL (ref 0–1.2)
BUN SERPL-MCNC: 12 MG/DL (ref 6–23)
CALCIUM SERPL-MCNC: 8.7 MG/DL (ref 8.6–10.3)
CHLORIDE SERPL-SCNC: 110 MMOL/L (ref 98–107)
CO2 SERPL-SCNC: 24 MMOL/L (ref 21–32)
CREAT SERPL-MCNC: 0.74 MG/DL (ref 0.5–1.3)
EGFRCR SERPLBLD CKD-EPI 2021: >90 ML/MIN/1.73M*2
EOSINOPHIL # BLD AUTO: 0.12 X10*3/UL (ref 0–0.4)
EOSINOPHIL NFR BLD AUTO: 2.4 %
ERYTHROCYTE [DISTWIDTH] IN BLOOD BY AUTOMATED COUNT: 12.7 % (ref 11.5–14.5)
GLUCOSE SERPL-MCNC: 97 MG/DL (ref 74–99)
HCT VFR BLD AUTO: 45 % (ref 41–52)
HGB BLD-MCNC: 15.3 G/DL (ref 13.5–17.5)
IGG SERPL-MCNC: 343 MG/DL (ref 700–1600)
IMM GRANULOCYTES # BLD AUTO: 0.02 X10*3/UL (ref 0–0.5)
IMM GRANULOCYTES NFR BLD AUTO: 0.4 % (ref 0–0.9)
LDH SERPL L TO P-CCNC: 188 U/L (ref 84–246)
LYMPHOCYTES # BLD AUTO: 0.52 X10*3/UL (ref 0.8–3)
LYMPHOCYTES NFR BLD AUTO: 10.4 %
MCH RBC QN AUTO: 31.2 PG (ref 26–34)
MCHC RBC AUTO-ENTMCNC: 34 G/DL (ref 32–36)
MCV RBC AUTO: 92 FL (ref 80–100)
MONOCYTES # BLD AUTO: 0.6 X10*3/UL (ref 0.05–0.8)
MONOCYTES NFR BLD AUTO: 12 %
NEUTROPHILS # BLD AUTO: 3.69 X10*3/UL (ref 1.6–5.5)
NEUTROPHILS NFR BLD AUTO: 73.6 %
NRBC BLD-RTO: 0 /100 WBCS (ref 0–0)
PLATELET # BLD AUTO: 167 X10*3/UL (ref 150–450)
POTASSIUM SERPL-SCNC: 4 MMOL/L (ref 3.5–5.3)
PROT SERPL-MCNC: 5.8 G/DL (ref 6.4–8.2)
RBC # BLD AUTO: 4.9 X10*6/UL (ref 4.5–5.9)
SODIUM SERPL-SCNC: 143 MMOL/L (ref 136–145)
WBC # BLD AUTO: 5 X10*3/UL (ref 4.4–11.3)

## 2025-08-11 PROCEDURE — 82784 ASSAY IGA/IGD/IGG/IGM EACH: CPT

## 2025-08-11 PROCEDURE — 85025 COMPLETE CBC W/AUTO DIFF WBC: CPT

## 2025-08-11 PROCEDURE — 80053 COMPREHEN METABOLIC PANEL: CPT

## 2025-08-11 PROCEDURE — 36415 COLL VENOUS BLD VENIPUNCTURE: CPT

## 2025-08-11 PROCEDURE — 83615 LACTATE (LD) (LDH) ENZYME: CPT

## 2025-08-11 ASSESSMENT — ENCOUNTER SYMPTOMS
GASTROINTESTINAL NEGATIVE: 1
ENDOCRINE NEGATIVE: 1
CARDIOVASCULAR NEGATIVE: 1
HEMATOLOGIC/LYMPHATIC NEGATIVE: 1
EYES NEGATIVE: 1
LIGHT-HEADEDNESS: 1
APPETITE CHANGE: 1
FATIGUE: 1
PSYCHIATRIC NEGATIVE: 1

## 2025-08-12 ENCOUNTER — APPOINTMENT (OUTPATIENT)
Dept: HEMATOLOGY/ONCOLOGY | Facility: HOSPITAL | Age: 75
End: 2025-08-12
Payer: MEDICARE

## 2025-08-12 ENCOUNTER — INFUSION (OUTPATIENT)
Dept: HEMATOLOGY/ONCOLOGY | Facility: HOSPITAL | Age: 75
End: 2025-08-12
Payer: MEDICARE

## 2025-08-12 ENCOUNTER — OFFICE VISIT (OUTPATIENT)
Dept: HEMATOLOGY/ONCOLOGY | Facility: HOSPITAL | Age: 75
End: 2025-08-12
Payer: MEDICARE

## 2025-08-12 VITALS
DIASTOLIC BLOOD PRESSURE: 75 MMHG | OXYGEN SATURATION: 97 % | WEIGHT: 200.1 LBS | RESPIRATION RATE: 16 BRPM | TEMPERATURE: 97 F | SYSTOLIC BLOOD PRESSURE: 123 MMHG | HEART RATE: 83 BPM | BODY MASS INDEX: 28.71 KG/M2

## 2025-08-12 VITALS
TEMPERATURE: 98.4 F | RESPIRATION RATE: 18 BRPM | SYSTOLIC BLOOD PRESSURE: 124 MMHG | DIASTOLIC BLOOD PRESSURE: 65 MMHG | OXYGEN SATURATION: 95 % | HEART RATE: 78 BPM

## 2025-08-12 DIAGNOSIS — C83.10 MANTLE CELL LYMPHOMA, UNSPECIFIED BODY REGION (MULTI): Primary | ICD-10-CM

## 2025-08-12 DIAGNOSIS — Z92.21 PERSONAL HISTORY OF ANTINEOPLASTIC CHEMOTHERAPY: ICD-10-CM

## 2025-08-12 DIAGNOSIS — D84.9 IMMUNOCOMPROMISED STATE: ICD-10-CM

## 2025-08-12 DIAGNOSIS — Z92.850 HISTORY OF CHIMERIC ANTIGEN RECEPTOR T-CELL THERAPY: ICD-10-CM

## 2025-08-12 DIAGNOSIS — C83.10 MANTLE CELL LYMPHOMA, UNSPECIFIED BODY REGION (MULTI): ICD-10-CM

## 2025-08-12 DIAGNOSIS — D80.1 HYPOGAMMAGLOBULINEMIA (MULTI): ICD-10-CM

## 2025-08-12 PROCEDURE — 96375 TX/PRO/DX INJ NEW DRUG ADDON: CPT | Mod: INF

## 2025-08-12 PROCEDURE — 2500000001 HC RX 250 WO HCPCS SELF ADMINISTERED DRUGS (ALT 637 FOR MEDICARE OP): Performed by: INTERNAL MEDICINE

## 2025-08-12 PROCEDURE — 96366 THER/PROPH/DIAG IV INF ADDON: CPT | Mod: INF

## 2025-08-12 PROCEDURE — 99214 OFFICE O/P EST MOD 30 MIN: CPT | Mod: 25 | Performed by: NURSE PRACTITIONER

## 2025-08-12 PROCEDURE — 2500000004 HC RX 250 GENERAL PHARMACY W/ HCPCS (ALT 636 FOR OP/ED): Mod: TB | Performed by: INTERNAL MEDICINE

## 2025-08-12 PROCEDURE — 96365 THER/PROPH/DIAG IV INF INIT: CPT | Mod: INF

## 2025-08-12 PROCEDURE — 2500000004 HC RX 250 GENERAL PHARMACY W/ HCPCS (ALT 636 FOR OP/ED): Mod: JZ,TB | Performed by: INTERNAL MEDICINE

## 2025-08-12 RX ORDER — DIPHENHYDRAMINE HCL 25 MG
25 CAPSULE ORAL ONCE
OUTPATIENT
Start: 2025-08-26

## 2025-08-12 RX ORDER — HEPARIN SODIUM,PORCINE/PF 10 UNIT/ML
50 SYRINGE (ML) INTRAVENOUS AS NEEDED
OUTPATIENT
Start: 2025-08-12

## 2025-08-12 RX ORDER — DIPHENHYDRAMINE HCL 25 MG
25 CAPSULE ORAL ONCE
Status: COMPLETED | OUTPATIENT
Start: 2025-08-12 | End: 2025-08-12

## 2025-08-12 RX ORDER — HEPARIN 100 UNIT/ML
500 SYRINGE INTRAVENOUS AS NEEDED
OUTPATIENT
Start: 2025-08-12

## 2025-08-12 RX ORDER — EPINEPHRINE 0.3 MG/.3ML
0.3 INJECTION SUBCUTANEOUS EVERY 5 MIN PRN
OUTPATIENT
Start: 2025-08-26

## 2025-08-12 RX ORDER — DIPHENHYDRAMINE HYDROCHLORIDE 50 MG/ML
50 INJECTION, SOLUTION INTRAMUSCULAR; INTRAVENOUS AS NEEDED
OUTPATIENT
Start: 2025-08-26

## 2025-08-12 RX ORDER — FAMOTIDINE 10 MG/ML
20 INJECTION, SOLUTION INTRAVENOUS ONCE AS NEEDED
OUTPATIENT
Start: 2025-08-26

## 2025-08-12 RX ORDER — ACETAMINOPHEN 325 MG/1
650 TABLET ORAL ONCE
Status: COMPLETED | OUTPATIENT
Start: 2025-08-12 | End: 2025-08-12

## 2025-08-12 RX ORDER — ACETAMINOPHEN 325 MG/1
650 TABLET ORAL ONCE
OUTPATIENT
Start: 2025-08-26

## 2025-08-12 RX ORDER — ALBUTEROL SULFATE 0.83 MG/ML
3 SOLUTION RESPIRATORY (INHALATION) AS NEEDED
OUTPATIENT
Start: 2025-08-26

## 2025-08-12 RX ADMIN — DIPHENHYDRAMINE HYDROCHLORIDE 25 MG: 25 CAPSULE ORAL at 14:36

## 2025-08-12 RX ADMIN — METHYLPREDNISOLONE SODIUM SUCCINATE 20 MG: 40 INJECTION, POWDER, FOR SOLUTION INTRAMUSCULAR; INTRAVENOUS at 14:37

## 2025-08-12 RX ADMIN — IMMUNE GLOBULIN INFUSION (HUMAN) 35 G: 100 INJECTION, SOLUTION INTRAVENOUS; SUBCUTANEOUS at 15:12

## 2025-08-12 RX ADMIN — ACETAMINOPHEN 650 MG: 325 TABLET ORAL at 14:36

## 2025-08-12 ASSESSMENT — PAIN SCALES - GENERAL: PAINLEVEL_OUTOF10: 0-NO PAIN

## 2025-08-12 ASSESSMENT — ENCOUNTER SYMPTOMS
ARTHRALGIAS: 1
COUGH: 0

## 2025-08-13 LAB
CMV DNA SERPL NAA+PROBE-LOG IU: NORMAL {LOG_IU}/ML
LABORATORY COMMENT REPORT: NOT DETECTED

## 2025-08-19 ENCOUNTER — HOSPITAL ENCOUNTER (OUTPATIENT)
Dept: RADIOLOGY | Facility: CLINIC | Age: 75
Discharge: HOME | End: 2025-08-19
Payer: MEDICARE

## 2025-08-19 DIAGNOSIS — C83.10 MANTLE CELL LYMPHOMA, UNSPECIFIED BODY REGION (MULTI): ICD-10-CM

## 2025-08-19 PROCEDURE — A9552 F18 FDG: HCPCS | Performed by: NURSE PRACTITIONER

## 2025-08-19 PROCEDURE — 78815 PET IMAGE W/CT SKULL-THIGH: CPT

## 2025-08-19 PROCEDURE — 78815 PET IMAGE W/CT SKULL-THIGH: CPT | Mod: PS

## 2025-08-19 PROCEDURE — 3430000001 HC RX 343 DIAGNOSTIC RADIOPHARMACEUTICALS: Performed by: NURSE PRACTITIONER

## 2025-08-19 RX ORDER — FLUDEOXYGLUCOSE F 18 200 MCI/ML
11.5 INJECTION, SOLUTION INTRAVENOUS
Status: COMPLETED | OUTPATIENT
Start: 2025-08-19 | End: 2025-08-19

## 2025-08-19 RX ADMIN — FLUDEOXYGLUCOSE F 18 11.5 MILLICURIE: 200 INJECTION, SOLUTION INTRAVENOUS at 08:13

## 2025-08-21 ENCOUNTER — APPOINTMENT (OUTPATIENT)
Dept: PRIMARY CARE | Facility: CLINIC | Age: 75
End: 2025-08-21
Payer: MEDICARE

## 2025-09-18 ENCOUNTER — APPOINTMENT (OUTPATIENT)
Dept: PRIMARY CARE | Facility: CLINIC | Age: 75
End: 2025-09-18
Payer: MEDICARE

## 2025-09-23 ENCOUNTER — APPOINTMENT (OUTPATIENT)
Dept: HEMATOLOGY/ONCOLOGY | Facility: HOSPITAL | Age: 75
End: 2025-09-23
Payer: MEDICARE

## 2025-10-27 ENCOUNTER — APPOINTMENT (OUTPATIENT)
Dept: OPHTHALMOLOGY | Facility: CLINIC | Age: 75
End: 2025-10-27
Payer: MEDICARE